# Patient Record
Sex: MALE | Race: WHITE | NOT HISPANIC OR LATINO | Employment: OTHER | URBAN - METROPOLITAN AREA
[De-identification: names, ages, dates, MRNs, and addresses within clinical notes are randomized per-mention and may not be internally consistent; named-entity substitution may affect disease eponyms.]

---

## 2017-01-13 ENCOUNTER — ALLSCRIPTS OFFICE VISIT (OUTPATIENT)
Dept: OTHER | Facility: OTHER | Age: 72
End: 2017-01-13

## 2017-02-01 ENCOUNTER — ALLSCRIPTS OFFICE VISIT (OUTPATIENT)
Dept: RADIOLOGY | Facility: MEDICAL CENTER | Age: 72
End: 2017-02-01
Payer: COMMERCIAL

## 2017-02-09 ENCOUNTER — GENERIC CONVERSION - ENCOUNTER (OUTPATIENT)
Dept: OTHER | Facility: OTHER | Age: 72
End: 2017-02-09

## 2017-02-15 ENCOUNTER — ALLSCRIPTS OFFICE VISIT (OUTPATIENT)
Dept: RADIOLOGY | Facility: MEDICAL CENTER | Age: 72
End: 2017-02-15
Payer: COMMERCIAL

## 2017-02-28 ENCOUNTER — ALLSCRIPTS OFFICE VISIT (OUTPATIENT)
Dept: OTHER | Facility: OTHER | Age: 72
End: 2017-02-28

## 2017-03-31 ENCOUNTER — ALLSCRIPTS OFFICE VISIT (OUTPATIENT)
Dept: OTHER | Facility: OTHER | Age: 72
End: 2017-03-31

## 2017-04-07 ENCOUNTER — ALLSCRIPTS OFFICE VISIT (OUTPATIENT)
Dept: OTHER | Facility: OTHER | Age: 72
End: 2017-04-07

## 2017-04-21 DIAGNOSIS — Z12.5 ENCOUNTER FOR SCREENING FOR MALIGNANT NEOPLASM OF PROSTATE: ICD-10-CM

## 2017-04-21 DIAGNOSIS — M48.061 SPINAL STENOSIS OF LUMBAR REGION: ICD-10-CM

## 2017-04-21 DIAGNOSIS — Z01.818 ENCOUNTER FOR OTHER PREPROCEDURAL EXAMINATION: ICD-10-CM

## 2017-05-01 ENCOUNTER — LAB REQUISITION (OUTPATIENT)
Dept: LAB | Facility: HOSPITAL | Age: 72
End: 2017-05-01
Payer: COMMERCIAL

## 2017-05-01 ENCOUNTER — LAB (OUTPATIENT)
Dept: LAB | Facility: MEDICAL CENTER | Age: 72
End: 2017-05-01
Payer: COMMERCIAL

## 2017-05-01 ENCOUNTER — TRANSCRIBE ORDERS (OUTPATIENT)
Dept: ADMINISTRATIVE | Facility: HOSPITAL | Age: 72
End: 2017-05-01

## 2017-05-01 DIAGNOSIS — Z01.818 ENCOUNTER FOR OTHER PREPROCEDURAL EXAMINATION: ICD-10-CM

## 2017-05-01 DIAGNOSIS — M48.061 SPINAL STENOSIS OF LUMBAR REGION: ICD-10-CM

## 2017-05-01 LAB
ABO GROUP BLD: NORMAL
ALBUMIN SERPL BCP-MCNC: 3.7 G/DL (ref 3.5–5)
ALP SERPL-CCNC: 44 U/L (ref 46–116)
ALT SERPL W P-5'-P-CCNC: 29 U/L (ref 12–78)
ANION GAP SERPL CALCULATED.3IONS-SCNC: 6 MMOL/L (ref 4–13)
APTT PPP: 30 SECONDS (ref 23–35)
AST SERPL W P-5'-P-CCNC: 16 U/L (ref 5–45)
BASOPHILS # BLD AUTO: 0.02 THOUSANDS/ΜL (ref 0–0.1)
BASOPHILS NFR BLD AUTO: 1 % (ref 0–1)
BILIRUB SERPL-MCNC: 0.74 MG/DL (ref 0.2–1)
BILIRUB UR QL STRIP: NEGATIVE
BLD GP AB SCN SERPL QL: NEGATIVE
BUN SERPL-MCNC: 15 MG/DL (ref 5–25)
CALCIUM SERPL-MCNC: 9.1 MG/DL (ref 8.3–10.1)
CHLORIDE SERPL-SCNC: 106 MMOL/L (ref 100–108)
CLARITY UR: CLEAR
CO2 SERPL-SCNC: 30 MMOL/L (ref 21–32)
COLOR UR: YELLOW
CREAT SERPL-MCNC: 0.81 MG/DL (ref 0.6–1.3)
EOSINOPHIL # BLD AUTO: 0.1 THOUSAND/ΜL (ref 0–0.61)
EOSINOPHIL NFR BLD AUTO: 2 % (ref 0–6)
ERYTHROCYTE [DISTWIDTH] IN BLOOD BY AUTOMATED COUNT: 13.2 % (ref 11.6–15.1)
EST. AVERAGE GLUCOSE BLD GHB EST-MCNC: 114 MG/DL
GFR SERPL CREATININE-BSD FRML MDRD: >60 ML/MIN/1.73SQ M
GLUCOSE P FAST SERPL-MCNC: 98 MG/DL (ref 65–99)
GLUCOSE UR STRIP-MCNC: NEGATIVE MG/DL
HBA1C MFR BLD: 5.6 % (ref 4.2–6.3)
HCT VFR BLD AUTO: 41.4 % (ref 36.5–49.3)
HGB BLD-MCNC: 14 G/DL (ref 12–17)
HGB UR QL STRIP.AUTO: NEGATIVE
INR PPP: 1.02 (ref 0.86–1.16)
KETONES UR STRIP-MCNC: NEGATIVE MG/DL
LEUKOCYTE ESTERASE UR QL STRIP: NEGATIVE
LYMPHOCYTES # BLD AUTO: 1.49 THOUSANDS/ΜL (ref 0.6–4.47)
LYMPHOCYTES NFR BLD AUTO: 35 % (ref 14–44)
MCH RBC QN AUTO: 30.9 PG (ref 26.8–34.3)
MCHC RBC AUTO-ENTMCNC: 33.8 G/DL (ref 31.4–37.4)
MCV RBC AUTO: 91 FL (ref 82–98)
MONOCYTES # BLD AUTO: 0.31 THOUSAND/ΜL (ref 0.17–1.22)
MONOCYTES NFR BLD AUTO: 7 % (ref 4–12)
NEUTROPHILS # BLD AUTO: 2.29 THOUSANDS/ΜL (ref 1.85–7.62)
NEUTS SEG NFR BLD AUTO: 55 % (ref 43–75)
NITRITE UR QL STRIP: NEGATIVE
NRBC BLD AUTO-RTO: 0 /100 WBCS
PH UR STRIP.AUTO: 6.5 [PH] (ref 4.5–8)
PLATELET # BLD AUTO: 122 THOUSANDS/UL (ref 149–390)
PMV BLD AUTO: 11.5 FL (ref 8.9–12.7)
POTASSIUM SERPL-SCNC: 4 MMOL/L (ref 3.5–5.3)
PROT SERPL-MCNC: 7.2 G/DL (ref 6.4–8.2)
PROT UR STRIP-MCNC: NEGATIVE MG/DL
PROTHROMBIN TIME: 13.4 SECONDS (ref 12.1–14.4)
RBC # BLD AUTO: 4.53 MILLION/UL (ref 3.88–5.62)
RH BLD: POSITIVE
SODIUM SERPL-SCNC: 142 MMOL/L (ref 136–145)
SP GR UR STRIP.AUTO: 1 (ref 1–1.03)
SPECIMEN EXPIRATION DATE: NORMAL
UROBILINOGEN UR QL STRIP.AUTO: 0.2 E.U./DL
WBC # BLD AUTO: 4.22 THOUSAND/UL (ref 4.31–10.16)

## 2017-05-01 PROCEDURE — 83036 HEMOGLOBIN GLYCOSYLATED A1C: CPT

## 2017-05-01 PROCEDURE — 86900 BLOOD TYPING SEROLOGIC ABO: CPT | Performed by: NEUROLOGICAL SURGERY

## 2017-05-01 PROCEDURE — 81003 URINALYSIS AUTO W/O SCOPE: CPT

## 2017-05-01 PROCEDURE — 85730 THROMBOPLASTIN TIME PARTIAL: CPT

## 2017-05-01 PROCEDURE — 85025 COMPLETE CBC W/AUTO DIFF WBC: CPT

## 2017-05-01 PROCEDURE — 80053 COMPREHEN METABOLIC PANEL: CPT

## 2017-05-01 PROCEDURE — 36415 COLL VENOUS BLD VENIPUNCTURE: CPT

## 2017-05-01 PROCEDURE — 86901 BLOOD TYPING SEROLOGIC RH(D): CPT | Performed by: NEUROLOGICAL SURGERY

## 2017-05-01 PROCEDURE — 85610 PROTHROMBIN TIME: CPT

## 2017-05-01 PROCEDURE — 86850 RBC ANTIBODY SCREEN: CPT | Performed by: NEUROLOGICAL SURGERY

## 2017-05-12 ENCOUNTER — ALLSCRIPTS OFFICE VISIT (OUTPATIENT)
Dept: OTHER | Facility: OTHER | Age: 72
End: 2017-05-12

## 2017-05-15 ENCOUNTER — GENERIC CONVERSION - ENCOUNTER (OUTPATIENT)
Dept: OTHER | Facility: OTHER | Age: 72
End: 2017-05-15

## 2017-05-18 RX ORDER — SILDENAFIL 100 MG/1
TABLET, FILM COATED ORAL
COMMUNITY
Start: 2014-01-30 | End: 2022-02-02

## 2017-05-18 RX ORDER — ATORVASTATIN CALCIUM 20 MG/1
20 TABLET, FILM COATED ORAL DAILY
COMMUNITY
Start: 2012-10-23 | End: 2018-05-21 | Stop reason: SDUPTHER

## 2017-05-18 RX ORDER — DOXAZOSIN MESYLATE 4 MG/1
4 TABLET ORAL DAILY
COMMUNITY
End: 2018-05-21 | Stop reason: SDUPTHER

## 2017-05-18 RX ORDER — FLUTICASONE PROPIONATE 50 MCG
SPRAY, SUSPENSION (ML) NASAL
COMMUNITY
Start: 2015-07-21 | End: 2018-02-08 | Stop reason: SDUPTHER

## 2017-05-18 RX ORDER — ASPIRIN 81 MG/1
81 TABLET, CHEWABLE ORAL EVERY OTHER DAY
COMMUNITY
End: 2021-12-23

## 2017-05-22 ENCOUNTER — ALLSCRIPTS OFFICE VISIT (OUTPATIENT)
Dept: OTHER | Facility: OTHER | Age: 72
End: 2017-05-22

## 2017-05-23 LAB
LYME 18 KD IGG (HISTORICAL): ABNORMAL
LYME 23 KD IGG (HISTORICAL): ABNORMAL
LYME 23 KD IGM (HISTORICAL): ABNORMAL
LYME 28 KD IGG (HISTORICAL): ABNORMAL
LYME 30 KD IGG (HISTORICAL): ABNORMAL
LYME 39 KD IGG (HISTORICAL): ABNORMAL
LYME 39 KD IGM (HISTORICAL): ABNORMAL
LYME 41 KD IGG (HISTORICAL): REACTIVE
LYME 41 KD IGM (HISTORICAL): REACTIVE
LYME 45 KD IGG (HISTORICAL): ABNORMAL
LYME 58 KD IGG (HISTORICAL): ABNORMAL
LYME 66 KD IGG (HISTORICAL): REACTIVE
LYME 93 KD IGG (HISTORICAL): ABNORMAL
LYME IGG (HISTORICAL): NEGATIVE
LYME IGM (HISTORICAL): NEGATIVE

## 2017-06-19 ENCOUNTER — ALLSCRIPTS OFFICE VISIT (OUTPATIENT)
Dept: OTHER | Facility: OTHER | Age: 72
End: 2017-06-19

## 2017-06-27 ENCOUNTER — GENERIC CONVERSION - ENCOUNTER (OUTPATIENT)
Dept: OTHER | Facility: OTHER | Age: 72
End: 2017-06-27

## 2017-06-27 ENCOUNTER — ANESTHESIA EVENT (OUTPATIENT)
Dept: PERIOP | Facility: HOSPITAL | Age: 72
End: 2017-06-27
Payer: COMMERCIAL

## 2017-06-28 ENCOUNTER — HOSPITAL ENCOUNTER (OUTPATIENT)
Facility: HOSPITAL | Age: 72
Setting detail: OUTPATIENT SURGERY
Discharge: HOME | End: 2017-06-28
Attending: NEUROLOGICAL SURGERY | Admitting: NEUROLOGICAL SURGERY
Payer: COMMERCIAL

## 2017-06-28 ENCOUNTER — APPOINTMENT (OUTPATIENT)
Dept: RADIOLOGY | Facility: HOSPITAL | Age: 72
End: 2017-06-28
Payer: COMMERCIAL

## 2017-06-28 ENCOUNTER — ANESTHESIA (OUTPATIENT)
Dept: PERIOP | Facility: HOSPITAL | Age: 72
End: 2017-06-28
Payer: COMMERCIAL

## 2017-06-28 VITALS
SYSTOLIC BLOOD PRESSURE: 130 MMHG | HEART RATE: 70 BPM | WEIGHT: 200 LBS | DIASTOLIC BLOOD PRESSURE: 80 MMHG | BODY MASS INDEX: 27.09 KG/M2 | TEMPERATURE: 97.1 F | RESPIRATION RATE: 18 BRPM | HEIGHT: 72 IN | OXYGEN SATURATION: 93 %

## 2017-06-28 PROBLEM — M48.062 LUMBAR STENOSIS WITH NEUROGENIC CLAUDICATION: Chronic | Status: ACTIVE | Noted: 2017-06-28

## 2017-06-28 PROBLEM — M51.26 LUMBAR DISC HERNIATION: Chronic | Status: ACTIVE | Noted: 2017-06-28

## 2017-06-28 PROBLEM — M54.16 LUMBAR RADICULAR PAIN: Chronic | Status: ACTIVE | Noted: 2017-06-28

## 2017-06-28 PROBLEM — M51.26 LUMBAR DISC HERNIATION: Chronic | Status: RESOLVED | Noted: 2017-06-28 | Resolved: 2017-06-28

## 2017-06-28 LAB
ABO GROUP BLD: NORMAL
BLD GP AB SCN SERPL QL: NEGATIVE
RH BLD: POSITIVE
SPECIMEN EXPIRATION DATE: NORMAL

## 2017-06-28 PROCEDURE — 86850 RBC ANTIBODY SCREEN: CPT | Performed by: NEUROLOGICAL SURGERY

## 2017-06-28 PROCEDURE — 86900 BLOOD TYPING SEROLOGIC ABO: CPT | Performed by: NEUROLOGICAL SURGERY

## 2017-06-28 PROCEDURE — 72100 X-RAY EXAM L-S SPINE 2/3 VWS: CPT

## 2017-06-28 PROCEDURE — 86901 BLOOD TYPING SEROLOGIC RH(D): CPT | Performed by: NEUROLOGICAL SURGERY

## 2017-06-28 RX ORDER — METHOCARBAMOL 500 MG/1
500 TABLET, FILM COATED ORAL EVERY 6 HOURS PRN
Status: DISCONTINUED | OUTPATIENT
Start: 2017-06-28 | End: 2017-06-28 | Stop reason: HOSPADM

## 2017-06-28 RX ORDER — GLYCOPYRROLATE 0.2 MG/ML
INJECTION INTRAMUSCULAR; INTRAVENOUS AS NEEDED
Status: DISCONTINUED | OUTPATIENT
Start: 2017-06-28 | End: 2017-06-28 | Stop reason: SURG

## 2017-06-28 RX ORDER — LIDOCAINE HYDROCHLORIDE AND EPINEPHRINE 10; 10 MG/ML; UG/ML
INJECTION, SOLUTION INFILTRATION; PERINEURAL AS NEEDED
Status: DISCONTINUED | OUTPATIENT
Start: 2017-06-28 | End: 2017-06-28 | Stop reason: HOSPADM

## 2017-06-28 RX ORDER — IBUPROFEN 400 MG/1
400 TABLET ORAL EVERY 6 HOURS PRN
Status: DISCONTINUED | OUTPATIENT
Start: 2017-06-28 | End: 2017-06-28 | Stop reason: HOSPADM

## 2017-06-28 RX ORDER — OXYCODONE HYDROCHLORIDE AND ACETAMINOPHEN 5; 325 MG/1; MG/1
1 TABLET ORAL EVERY 4 HOURS PRN
Qty: 28 TABLET | Refills: 0 | Status: SHIPPED | OUTPATIENT
Start: 2017-06-28 | End: 2017-07-08

## 2017-06-28 RX ORDER — FENTANYL CITRATE 50 UG/ML
INJECTION, SOLUTION INTRAMUSCULAR; INTRAVENOUS AS NEEDED
Status: DISCONTINUED | OUTPATIENT
Start: 2017-06-28 | End: 2017-06-28 | Stop reason: SURG

## 2017-06-28 RX ORDER — MORPHINE SULFATE 2 MG/ML
1 INJECTION, SOLUTION INTRAMUSCULAR; INTRAVENOUS
Status: DISCONTINUED | OUTPATIENT
Start: 2017-06-28 | End: 2017-06-28 | Stop reason: HOSPADM

## 2017-06-28 RX ORDER — SODIUM CHLORIDE, SODIUM LACTATE, POTASSIUM CHLORIDE, CALCIUM CHLORIDE 600; 310; 30; 20 MG/100ML; MG/100ML; MG/100ML; MG/100ML
100 INJECTION, SOLUTION INTRAVENOUS CONTINUOUS
Status: DISCONTINUED | OUTPATIENT
Start: 2017-06-28 | End: 2017-06-28 | Stop reason: HOSPADM

## 2017-06-28 RX ORDER — OXYCODONE HYDROCHLORIDE AND ACETAMINOPHEN 5; 325 MG/1; MG/1
1 TABLET ORAL EVERY 4 HOURS PRN
Status: DISCONTINUED | OUTPATIENT
Start: 2017-06-28 | End: 2017-06-28 | Stop reason: HOSPADM

## 2017-06-28 RX ORDER — ONDANSETRON 2 MG/ML
4 INJECTION INTRAMUSCULAR; INTRAVENOUS ONCE AS NEEDED
Status: DISCONTINUED | OUTPATIENT
Start: 2017-06-28 | End: 2017-06-28 | Stop reason: HOSPADM

## 2017-06-28 RX ORDER — METHYLPREDNISOLONE ACETATE 40 MG/ML
INJECTION, SUSPENSION INTRA-ARTICULAR; INTRALESIONAL; INTRAMUSCULAR; SOFT TISSUE AS NEEDED
Status: DISCONTINUED | OUTPATIENT
Start: 2017-06-28 | End: 2017-06-28 | Stop reason: HOSPADM

## 2017-06-28 RX ORDER — SODIUM CHLORIDE, SODIUM LACTATE, POTASSIUM CHLORIDE, CALCIUM CHLORIDE 600; 310; 30; 20 MG/100ML; MG/100ML; MG/100ML; MG/100ML
INJECTION, SOLUTION INTRAVENOUS CONTINUOUS PRN
Status: DISCONTINUED | OUTPATIENT
Start: 2017-06-28 | End: 2017-06-28 | Stop reason: SURG

## 2017-06-28 RX ORDER — MAGNESIUM HYDROXIDE 1200 MG/15ML
LIQUID ORAL AS NEEDED
Status: DISCONTINUED | OUTPATIENT
Start: 2017-06-28 | End: 2017-06-28 | Stop reason: HOSPADM

## 2017-06-28 RX ORDER — EPHEDRINE SULFATE 50 MG/ML
INJECTION, SOLUTION INTRAVENOUS AS NEEDED
Status: DISCONTINUED | OUTPATIENT
Start: 2017-06-28 | End: 2017-06-28 | Stop reason: SURG

## 2017-06-28 RX ORDER — SODIUM CHLORIDE 9 MG/ML
100 INJECTION, SOLUTION INTRAVENOUS CONTINUOUS
Status: DISCONTINUED | OUTPATIENT
Start: 2017-06-28 | End: 2017-06-28 | Stop reason: HOSPADM

## 2017-06-28 RX ORDER — PROPOFOL 10 MG/ML
INJECTION, EMULSION INTRAVENOUS AS NEEDED
Status: DISCONTINUED | OUTPATIENT
Start: 2017-06-28 | End: 2017-06-28 | Stop reason: SURG

## 2017-06-28 RX ORDER — ROCURONIUM BROMIDE 10 MG/ML
INJECTION, SOLUTION INTRAVENOUS AS NEEDED
Status: DISCONTINUED | OUTPATIENT
Start: 2017-06-28 | End: 2017-06-28 | Stop reason: SURG

## 2017-06-28 RX ORDER — ONDANSETRON 2 MG/ML
4 INJECTION INTRAMUSCULAR; INTRAVENOUS EVERY 6 HOURS PRN
Status: DISCONTINUED | OUTPATIENT
Start: 2017-06-28 | End: 2017-06-28 | Stop reason: HOSPADM

## 2017-06-28 RX ORDER — BUPIVACAINE HYDROCHLORIDE AND EPINEPHRINE 5; 5 MG/ML; UG/ML
INJECTION, SOLUTION EPIDURAL; INTRACAUDAL; PERINEURAL AS NEEDED
Status: DISCONTINUED | OUTPATIENT
Start: 2017-06-28 | End: 2017-06-28 | Stop reason: HOSPADM

## 2017-06-28 RX ORDER — LIDOCAINE HYDROCHLORIDE 10 MG/ML
INJECTION, SOLUTION INFILTRATION; PERINEURAL AS NEEDED
Status: DISCONTINUED | OUTPATIENT
Start: 2017-06-28 | End: 2017-06-28 | Stop reason: SURG

## 2017-06-28 RX ORDER — KETOROLAC TROMETHAMINE 30 MG/ML
INJECTION, SOLUTION INTRAMUSCULAR; INTRAVENOUS AS NEEDED
Status: DISCONTINUED | OUTPATIENT
Start: 2017-06-28 | End: 2017-06-28 | Stop reason: SURG

## 2017-06-28 RX ORDER — ONDANSETRON 2 MG/ML
INJECTION INTRAMUSCULAR; INTRAVENOUS AS NEEDED
Status: DISCONTINUED | OUTPATIENT
Start: 2017-06-28 | End: 2017-06-28 | Stop reason: SURG

## 2017-06-28 RX ADMIN — EPHEDRINE SULFATE 10 MG: 50 INJECTION, SOLUTION INTRAMUSCULAR; INTRAVENOUS; SUBCUTANEOUS at 08:40

## 2017-06-28 RX ADMIN — VASOPRESSIN 0.4 UNITS: 20 INJECTION, SOLUTION INTRAMUSCULAR; SUBCUTANEOUS at 09:37

## 2017-06-28 RX ADMIN — KETOROLAC TROMETHAMINE 15 MG: 30 INJECTION, SOLUTION INTRAMUSCULAR at 09:38

## 2017-06-28 RX ADMIN — NEOSTIGMINE METHYLSULFATE 4 MG: 1 INJECTION, SOLUTION INTRAMUSCULAR; INTRAVENOUS; SUBCUTANEOUS at 09:43

## 2017-06-28 RX ADMIN — DEXAMETHASONE SODIUM PHOSPHATE 8 MG: 10 INJECTION INTRAMUSCULAR; INTRAVENOUS at 08:21

## 2017-06-28 RX ADMIN — PHENYLEPHRINE HYDROCHLORIDE 50 MCG/MIN: 10 INJECTION INTRAVENOUS at 08:28

## 2017-06-28 RX ADMIN — PROPOFOL 50 MG: 10 INJECTION, EMULSION INTRAVENOUS at 08:58

## 2017-06-28 RX ADMIN — SODIUM CHLORIDE, SODIUM LACTATE, POTASSIUM CHLORIDE, AND CALCIUM CHLORIDE: .6; .31; .03; .02 INJECTION, SOLUTION INTRAVENOUS at 07:15

## 2017-06-28 RX ADMIN — ROCURONIUM BROMIDE 50 MG: 10 INJECTION, SOLUTION INTRAVENOUS at 07:44

## 2017-06-28 RX ADMIN — CEFAZOLIN SODIUM 2000 MG: 2 SOLUTION INTRAVENOUS at 07:55

## 2017-06-28 RX ADMIN — LIDOCAINE HYDROCHLORIDE 50 MG: 10 INJECTION, SOLUTION INFILTRATION; PERINEURAL at 07:43

## 2017-06-28 RX ADMIN — EPHEDRINE SULFATE 10 MG: 50 INJECTION, SOLUTION INTRAMUSCULAR; INTRAVENOUS; SUBCUTANEOUS at 09:18

## 2017-06-28 RX ADMIN — GLYCOPYRROLATE 0.8 MG: 0.2 INJECTION INTRAMUSCULAR; INTRAVENOUS at 09:43

## 2017-06-28 RX ADMIN — FENTANYL CITRATE 100 MCG: 50 INJECTION INTRAMUSCULAR; INTRAVENOUS at 07:43

## 2017-06-28 RX ADMIN — EPHEDRINE SULFATE 10 MG: 50 INJECTION, SOLUTION INTRAMUSCULAR; INTRAVENOUS; SUBCUTANEOUS at 09:06

## 2017-06-28 RX ADMIN — VASOPRESSIN 0.4 UNITS: 20 INJECTION, SOLUTION INTRAMUSCULAR; SUBCUTANEOUS at 09:28

## 2017-06-28 RX ADMIN — EPHEDRINE SULFATE 10 MG: 50 INJECTION, SOLUTION INTRAMUSCULAR; INTRAVENOUS; SUBCUTANEOUS at 09:16

## 2017-06-28 RX ADMIN — SODIUM CHLORIDE, SODIUM LACTATE, POTASSIUM CHLORIDE, AND CALCIUM CHLORIDE: .6; .31; .03; .02 INJECTION, SOLUTION INTRAVENOUS at 09:17

## 2017-06-28 RX ADMIN — PROPOFOL 200 MG: 10 INJECTION, EMULSION INTRAVENOUS at 07:43

## 2017-06-28 RX ADMIN — ONDANSETRON 4 MG: 2 INJECTION INTRAMUSCULAR; INTRAVENOUS at 07:39

## 2017-06-28 NOTE — DISCHARGE INSTRUCTIONS
Activity:    1  Do not lift more than 20 pounds for 2 weeks  Surgical incision care:    1  Keep dressing in place for 3 days  2  Keep incision dry for 3 days  3  May allow clean water to flow over incision after 3 days  4  Do not immerse the incision in water for 4 weeks  5  After 3 days, incision may be left open to air, but should remain clean  6  Do not apply any creams or ointments to the incision, unless otherwise instructed by [facility]  7  Contact office if increasing redness, drainage, pain or swelling around the incision  Postoperative medication:    1  [employer] will provide pain medication for the first 6 weeks after surgery  2  If [facility] is providing pain medication, please contact office for questions regarding dosage and modifications  3  If [facility] is not providing pain medication postoperatively, then contact pain specialist or PCP for additional instructions and prescriptions  4  Resume antiplatelet and anticoagulation medication the day after surgery, unless otherwise instructed  5  Do not operate heavy machinery or vehicles while taking sedating medications

## 2017-06-28 NOTE — OP NOTE
OPERATIVE REPORT  PATIENT NAME: Almas Moy    :    MRN: [id number]  Pt Location: AN OR ROOM 04    SURGERY DATE: 2017    Surgeon(s) and Role:     * Zo Mason MD - Primary  No assistants  No qualified resident available  Preop Diagnosis:  Neurogenic claudication due to lumbar spinal stenosis [M48 06]    Post-Op Diagnosis Codes:     * Neurogenic claudication due to lumbar spinal stenosis [M48 06]    Procedure:  1  L4-5 minimally invasive bilateral laminotomy from a left-sided approach and microdiscectomy for decompression with Metrix system and epidural steroid injection  2  Operating room microscope for microdissection  Specimen(s):  * No specimens in log *    Estimated Blood Loss:   15 mL    Drains:       Anesthesia Type:   General    Operative Indications:  Neurogenic claudication due to lumbar spinal stenosis [M48 06]    Operative Findings:  Ligamentous and facet hypertrophy contributing to stenosis in addition to moderate size disc bulge  Complications:   None    Procedure and Technique:  Clinical Note:    The goals and alternatives to the procedure described above were discussed with patient and spouse  Surgery is intended to decompress neural structures and hopefully improve radicular pain  Weakness, numbness and back pain are less likely to improve  The risks of surgery were described in detail  1  Risk of general anesthetic, with possible cardiac and respiratory complication  There is risk of infection and bleeding  2  Risk of neurological injury with new pain, weakness or numbness or difficulties with bowel and bladder function  The risk of CSF leak was described  3  Possible need for revision surgery in the future  Once all questions were answered to their satisfaction, they asked to proceed with surgery  Operating Room Note    The patient was brought to the operating room and placed under general anesthetic   Once all appropriate lines were in position, the patient was carefully turned in the prone position onto a Cameron frame  Care was taken to ensure that all pressure points were padded and the neck was in a neutral position  AP and lateral fluoroscopy were used to identify midline of the lumbar spine  A mini surgical timeout was undertaken identifying site side and level of surgery  The left the midline was prepped with alcohol swab  A spinal needle was placed under lateral fluoroscopic guidance demonstrated appropriate trajectory to the appropriate lumbar interspace  One percent lidocaine with 100,000 of epinephrine was used to infiltrate the soft tissue as the spinal needle was removed  A 2 cm incision was planned based on the entry point of the needle  The skin was then prepped and draped in usual sterile fashion  A full surgical timeout was undertaken identifying the site, side and level of surgery  The patient received preoperative antibiotics  10 blade was used to incise the skin  Monopolar cautery was used to dissect down and through the muscle fascia  A series of Metrix dilators were placed and a final Metrix retractor was placed with lateral fluoroscopy confirming the L4/5 level  This was also confirmed radiology  The operating microscope was then brought in for microdissection  Under microscopic magnification, monopolar cautery was used to remove the soft tissue off the lamina  A ball bur was used to drill the lamina and expose the underlying ligamentum flavum  The ligamentum flavum was undermined with a curved curette and removed Kerrison punch  I was able to identify the thecal sac and shoulder of the nerve root  Bipolar cautery and FloSeal were used for hemostasis  With the nerve root retracted behind a suction retractor, bipolar cautery was used for hemostasis of the epidural veins  I could appreciate a broad-based disc bulge  15 blade was used to incise the annulus   Pituitary rongeur was used to remove disc material  A moderate to large size disc fragment was removed and there was relaxation of the thecal sac  Disc space  was used to identify any other loose fragments which were than removed with pituitary rongeur  Cesia curet was used to deliver disc material into the space which was then removed with pituitary rongeur  Afterwards like a past ball hook along the ventral surface of the thecal sac and dorsal surface of the thecal sac and along the left lateral recess without any difficulty  The Metrix retractor was angled medially in the spinous process was undermined with Kerrison punch  Contralateral ligamentum flavum was removed  Afterwards like a past ball hook along the contralateral lateral recess without any difficulty  The Penfield 4 was placed in the space and ball hook was used to demonstrate the rostral extent of decompression under lateral fluoroscopy  These instruments were then removed  The surgical site was irrigated copiously with antibiotic impregnated irrigation  Bone wax, FloSeal and bipolar cautery were used for hemostasis  40 mg of Depo-Medrol and placed in the epidural space  The Metrix retractor was removed any bleeding From soft tissue was cauterized with monopolar and bipolar cautery  The surgical site was irrigated copiously with antibiotic impregnated irrigation  Vicryl suture was used to approximate the fascia and subcutaneous tissue  Monocryl was used to approximate the skin edges  0 5% Marcaine with 1/100,000 of epinephrine was used to infiltrate the soft tissue  A Miplex was applied  The count was correct at the end of the case and there were no complications  The patient was carefully transferred onto the operating gurney and extubated  The patient was transferred to the PACU where they were noted to be hemodynamically stable and neurologically unchanged  The results of surgery were discussed with patient and spouse      I was present for the entire procedure    Patient Disposition:  PACU SIGNATURE: Maria Eugenia Newberry MD  DATE: June 28, 2017  TIME: 9:54 AM

## 2017-07-05 ENCOUNTER — GENERIC CONVERSION - ENCOUNTER (OUTPATIENT)
Dept: OTHER | Facility: OTHER | Age: 72
End: 2017-07-05

## 2017-07-11 ENCOUNTER — ALLSCRIPTS OFFICE VISIT (OUTPATIENT)
Dept: OTHER | Facility: OTHER | Age: 72
End: 2017-07-11

## 2017-07-18 ENCOUNTER — GENERIC CONVERSION - ENCOUNTER (OUTPATIENT)
Dept: OTHER | Facility: OTHER | Age: 72
End: 2017-07-18

## 2017-07-31 ENCOUNTER — TRANSCRIBE ORDERS (OUTPATIENT)
Dept: ADMINISTRATIVE | Facility: HOSPITAL | Age: 72
End: 2017-07-31

## 2017-07-31 ENCOUNTER — HOSPITAL ENCOUNTER (OUTPATIENT)
Dept: CT IMAGING | Facility: HOSPITAL | Age: 72
Discharge: HOME | End: 2017-07-31
Payer: COMMERCIAL

## 2017-07-31 ENCOUNTER — ALLSCRIPTS OFFICE VISIT (OUTPATIENT)
Dept: OTHER | Facility: OTHER | Age: 72
End: 2017-07-31

## 2017-07-31 DIAGNOSIS — K57.30 DIVERTICULOSIS OF LARGE INTESTINE WITHOUT PERFORATION OR ABSCESS WITHOUT BLEEDING: ICD-10-CM

## 2017-07-31 DIAGNOSIS — K57.30 DIVERTICULOSIS OF LARGE INTESTINE WITHOUT DIVERTICULITIS: Primary | ICD-10-CM

## 2017-07-31 DIAGNOSIS — R10.32 LEFT LOWER QUADRANT PAIN: ICD-10-CM

## 2017-07-31 PROCEDURE — 74177 CT ABD & PELVIS W/CONTRAST: CPT

## 2017-07-31 RX ADMIN — IOHEXOL 100 ML: 350 INJECTION, SOLUTION INTRAVENOUS at 10:44

## 2017-07-31 RX ADMIN — IOHEXOL 50 ML: 240 INJECTION, SOLUTION INTRATHECAL; INTRAVASCULAR; INTRAVENOUS; ORAL at 10:44

## 2017-08-09 ENCOUNTER — ALLSCRIPTS OFFICE VISIT (OUTPATIENT)
Dept: OTHER | Facility: OTHER | Age: 72
End: 2017-08-09

## 2017-08-12 ENCOUNTER — ALLSCRIPTS OFFICE VISIT (OUTPATIENT)
Dept: OTHER | Facility: OTHER | Age: 72
End: 2017-08-12

## 2017-09-15 ENCOUNTER — ALLSCRIPTS OFFICE VISIT (OUTPATIENT)
Dept: OTHER | Facility: OTHER | Age: 72
End: 2017-09-15

## 2017-10-17 ENCOUNTER — ALLSCRIPTS OFFICE VISIT (OUTPATIENT)
Dept: OTHER | Facility: OTHER | Age: 72
End: 2017-10-17

## 2017-10-18 NOTE — PROGRESS NOTES
Assessment  1  Bilateral impacted cerumen (380 4) (H61 23)   2  Oral thrush (112 0) (B37 0)    Discussion/Summary    57-year-old male presenting today for ear lavage as described in the procedure  He has more narrow ear canals which I explained may make him more prone to ear wax buildup but they were not impacted  Patient tolerated procedure well and he will return to audiologist for hearing test   also wanted to follow up for his thrush  He reports being put on nystatin swish and spit for 10 days and then a lozenge which he has also completed her course  He states overall he feels better but his tongue still feels dry  On exam he does appear to have a vague grayish brown film but does not appear consistent with thrush  There are no oral or tongue lesions and no tongue redness or swelling  I told patient that I would discuss this with Dr Gerhardt Guest to see what next action would be and MA would call him being that he has been tx with 2 agents  Possible side effects of new medications were reviewed with the patient/guardian today  The treatment plan was reviewed with the patient/guardian  The patient/guardian understands and agrees with the treatment plan      Chief Complaint  Pt presents for ear lavage bilateral  Pt d/c Gabapentin  History of Present Illness  HPI: 77y/o male here today for ear lavage  saw audiologist and told him he needed ears cleaned  also states his thrush sxs have pretty much resolved, but tongue still feels dry  Review of Systems    Constitutional: no fever or chills, feels well, no tiredness, no recent weight loss or weight gain  ENT: as noted in HPI  Active Problems  1  Abdominal wall pain in left lower quadrant (789 04) (R10 32)   2  Acute diverticulitis (562 11) (K57 92)   3  Arthritis (716 90) (M19 90)   4  Chronic low back pain with sciatica (724 2,724 3,338 29) (M54 40,G89 29)   5  Colon, diverticulosis (562 10) (K57 30)   6   Enlarged prostate without lower urinary tract symptoms (luts) (600 00) (N40 0)   7  Erectile dysfunction (607 84) (N52 9)   8  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)   9  GERD without esophagitis (530 81) (K21 9)   10  Hearing loss (389 9) (H91 90)   11  Hyperlipidemia (272 4) (E78 5)   12  Insomnia (780 52) (G47 00)   13  Irritable bowel syndrome (IBS) (564 1) (K58 9)   14  Lumbar spinal stenosis (724 02) (M48 061)   15  Neurogenic claudication due to lumbar spinal stenosis (724 03) (M48 062)   16  Oral thrush (112 0) (B37 0)   17  Pre-diabetes (790 29) (R73 03)   18  Seasonal allergies (477 9) (J30 2)   19  Status post lumbar laminectomy (V45 89) (Z98 890)   20  Thrombocytopenia (287 5) (D69 6)   21  Vitamin D deficiency (268 9) (E55 9)    Past Medical History  1  History of Allergic rhinitis due to pollen (477 0) (J30 1)   2  History of Atypical pigmented skin lesion (709 00) (L81 9)   3  History of Depression screening (V79 0) (Z13 89)   4  History of Fatigue (780 79) (R53 83)   5  History of Hamstring strain (843 8) (S76 319A)   6  History of Hemorrhoids (455 6) (K64 9)   7  History of acute sinusitis (V12 69) (Z87 09)   8  History of bronchitis (V12 69) (Z87 09)   9  History of epistaxis (V12 69) (Z87 898)   10  History of epistaxis (V12 69) (Z87 898)   11  History of herpes zoster (V12 09) (Z86 19)   12  History of impacted cerumen (V12 49) (Z86 69)   13  History of impacted cerumen (V12 49) (Z86 69)   14  History of influenza vaccination (V49 89) (Z92 29)   15  History of malignant neoplasm of skin (V10 83) (Z85 828)   16  History of viral gastroenteritis (V12 09) (Z86 19)   17  History of Need for pneumococcal vaccination (V03 82) (Z23)   18  History of Need for pneumococcal vaccination (V03 82) (Z23)   19  History of Need for prophylactic vaccination and inoculation against influenza (V04 81)    (Z23)   20  History of Persistent dry cough (786 2) (R05)   21  History of Persistent dry cough (786 2) (R05)   22   History of Preoperative clearance (V72 84) (Z01 818)   23  History of Preoperative examination (V72 84) (Z01 818)   24  History of Preoperative testing (V72 84) (Z01 818)   25  History of Prostate cancer screening (V76 44) (Z12 5)   26  History of Rash (782 1) (R21)   27  History of Screening for depression (V79 0) (Z13 89)   28  History of Screening for depression (V79 0) (Z13 89)   29  History of Screening for other and unspecified genitourinary condition (V81 6) (Z13 89)   30  History of Screening for other and unspecified genitourinary condition (V81 6) (Z13 89)   31  History of Special screening for malignant neoplasm of prostate (V76 44) (Z12 5)   32  History of Special screening for other neurological conditions (V80 09) (Z13 89)   33  History of Special screening for other neurological conditions (V80 09) (Z13 89)   34  History of Wears hearing aid (V45 89) (Z97 4)    Family History  Mother    1  Family history of anemia (V18 2) (Z83 2)  Father    2  Family history of    3  Family history of Parkinson's disease (V17 2) (Z82 0)  Family History    4  Family history of Current Diet Is High In Cholesterol   5  Family history of anemia (V18 2) (Z83 2)   6  Family history of cardiac disorder (V17 49) (Z82 49)   7  Denied: Family history of mental disorder   8  Denied: Family history of substance abuse    Social History   · Completed graduate school, masters degree   · Former smoker (J12 54) (B07 368)   · Has 1 child   · High school or GED   · Lives with spouse   ·    · No illicit drug use   · Retired   · Social alcohol use (Z78 9)  The social history was reviewed and updated today  Surgical History  1  History of Cataract Surgery   2  History of Hemorrhoidectomy   3  History Of Prior Surgery   4  History of Neuroplasty Decompression Median Nerve At Carpal Tunnel   5  History of Tonsillectomy    Current Meds   1  Aspirin 81 MG CAPS; take 1 tablet every other day; Therapy: (Recorded:2017) to Recorded   2   Atorvastatin Calcium 20 MG Oral Tablet; TAKE 1 TABLET DAILY AS DIRECTED; Therapy: 47MAG6586 to (Evaluate:65Gzm7660)  Requested for: 97Htw8104; Last   Rx:[id number] Ordered   3  [employer] D3 5000 UNIT Oral Capsule; Take 1 tablet daily; Therapy: 07Gbk2154 to (Evaluate:17Nov2016); Last Rx:[id number] Ordered   4  Doxazosin Mesylate 4 MG Oral Tablet; TAKE 1 TABLET DAILY  Requested for:   27DSA6466; Last Rx:06Jun2017; Status: ACTIVE - Renewal Denied Ordered   5  Gabapentin 300 MG Oral Capsule; TAKE 1 CAPSULE 3 times daily; Therapy: 59QFJ9954 to (Evaluate:31Fbj8233)  Requested for: 49EOS8069; Last   Rx:[id number] Ordered   6  Melatonin 5 MG Oral Capsule; TAKE 1 CAPSULE Bedtime; Therapy: 07Htt4167 to (Evaluate:15Oct2017); Last Rx:[id number] Ordered   7  Viagra 100 MG Oral Tablet; TAKE 1 TABLET DAILY 1 HOUR BEFORE NEEDED; Therapy: 11YPA7216 to (Evaluate:57Swq7033); Last Rx:[id number] Ordered    The medication list was reviewed and updated today  Allergies  1  No Known Drug Allergies  2  Latex    Vitals   Recorded: 85GDP6120 08:01AM   Temperature 97 F, Tympanic   Heart Rate 71   Pulse Quality Normal   Respiration Quality Normal   Respiration 15   Systolic 485, LUE, Sitting   Diastolic 76, LUE, Sitting   Height 6 ft    Weight 203 lb 7 oz   BMI Calculated 27 59   BSA Calculated 2 15   O2 Saturation 97, RA   Pain Scale 0     Physical Exam    Constitutional   General appearance: No acute distress, well appearing and well nourished  appears healthy,-- comfortable,-- within normal limits of ideal weight-- and-- appearance reflects stated age  Ears, Nose, Mouth, and Throat   Otoscopic examination: Abnormal  -- minimal cerumen, TM's partially visible but ear canals anatomically narrow  Oropharynx: Abnormal  -- tongue appears generally wnl, slight grayish-brown coating  no redness, swelling or excoriations  Lymphatic   Palpation of lymph nodes in neck: No lymphadenopathy           Procedure            Procedure: cerumen removal  Indication: tympanic membrane(s) could not be visualized and cerumen impaction in both ears  Procedure Note: The procedure was performed by the Provider-- and-- lasted 15 minute(s)  A otoscope was placed in the ear canal(s) to visualize the ear canal debris  The ear was cleaned by using warm water irrigation  The procedure was successful  Post-Procedure:   Complications: there were no complications  Follow-up as needed  Future Appointments    Date/Time Provider Specialty Site   11/21/2017 10:45 AM Moira Manjarrez DO Family Medicine [address]   Electronically signed by : Joy Ceja, [facility]; Oct 17 2017  9:12AM EST                       (Author)    Electronically signed by :  Abby Portillo DO; Oct 17 2017 11:04PM EST                       (Author)

## 2017-11-12 DIAGNOSIS — N52.9 MALE ERECTILE DYSFUNCTION: ICD-10-CM

## 2017-11-12 DIAGNOSIS — E78.5 HYPERLIPIDEMIA: ICD-10-CM

## 2017-11-12 DIAGNOSIS — E55.9 VITAMIN D DEFICIENCY: ICD-10-CM

## 2017-11-12 DIAGNOSIS — D69.6 THROMBOCYTOPENIA (HCC): ICD-10-CM

## 2017-11-12 DIAGNOSIS — K21.9 GASTRO-ESOPHAGEAL REFLUX DISEASE WITHOUT ESOPHAGITIS: ICD-10-CM

## 2017-11-12 DIAGNOSIS — M19.90 OSTEOARTHRITIS: ICD-10-CM

## 2017-11-12 DIAGNOSIS — N40.0 ENLARGED PROSTATE WITHOUT LOWER URINARY TRACT SYMPTOMS (LUTS): ICD-10-CM

## 2017-11-12 DIAGNOSIS — F40.243 FEAR OF FLYING: ICD-10-CM

## 2017-11-12 DIAGNOSIS — R73.03 PREDIABETES: ICD-10-CM

## 2017-11-14 ENCOUNTER — TRANSCRIBE ORDERS (OUTPATIENT)
Dept: ADMINISTRATIVE | Facility: HOSPITAL | Age: 72
End: 2017-11-14

## 2017-11-14 ENCOUNTER — APPOINTMENT (OUTPATIENT)
Dept: LAB | Facility: MEDICAL CENTER | Age: 72
End: 2017-11-14
Payer: COMMERCIAL

## 2017-11-14 DIAGNOSIS — E55.9 VITAMIN D DEFICIENCY: ICD-10-CM

## 2017-11-14 DIAGNOSIS — M19.90 OSTEOARTHRITIS: ICD-10-CM

## 2017-11-14 DIAGNOSIS — F40.243 FEAR OF FLYING: ICD-10-CM

## 2017-11-14 DIAGNOSIS — Z12.5 ENCOUNTER FOR SCREENING FOR MALIGNANT NEOPLASM OF PROSTATE: ICD-10-CM

## 2017-11-14 DIAGNOSIS — R73.03 PREDIABETES: ICD-10-CM

## 2017-11-14 DIAGNOSIS — N52.9 MALE ERECTILE DYSFUNCTION: ICD-10-CM

## 2017-11-14 DIAGNOSIS — N40.0 ENLARGED PROSTATE WITHOUT LOWER URINARY TRACT SYMPTOMS (LUTS): ICD-10-CM

## 2017-11-14 DIAGNOSIS — E78.5 HYPERLIPIDEMIA: ICD-10-CM

## 2017-11-14 DIAGNOSIS — K21.9 GASTRO-ESOPHAGEAL REFLUX DISEASE WITHOUT ESOPHAGITIS: ICD-10-CM

## 2017-11-14 DIAGNOSIS — D69.6 THROMBOCYTOPENIA (HCC): ICD-10-CM

## 2017-11-14 LAB
ALBUMIN SERPL BCP-MCNC: 3.5 G/DL (ref 3.5–5)
ALP SERPL-CCNC: 49 U/L (ref 46–116)
ALT SERPL W P-5'-P-CCNC: 31 U/L (ref 12–78)
ANION GAP SERPL CALCULATED.3IONS-SCNC: 4 MMOL/L (ref 4–13)
AST SERPL W P-5'-P-CCNC: 19 U/L (ref 5–45)
BASOPHILS # BLD AUTO: 0.02 THOUSANDS/ΜL (ref 0–0.1)
BASOPHILS NFR BLD AUTO: 0 % (ref 0–1)
BILIRUB SERPL-MCNC: 0.62 MG/DL (ref 0.2–1)
BUN SERPL-MCNC: 16 MG/DL (ref 5–25)
CALCIUM SERPL-MCNC: 8.5 MG/DL (ref 8.3–10.1)
CHLORIDE SERPL-SCNC: 106 MMOL/L (ref 100–108)
CHOLEST SERPL-MCNC: 149 MG/DL (ref 50–200)
CO2 SERPL-SCNC: 29 MMOL/L (ref 21–32)
CREAT SERPL-MCNC: 0.78 MG/DL (ref 0.6–1.3)
EOSINOPHIL # BLD AUTO: 0.12 THOUSAND/ΜL (ref 0–0.61)
EOSINOPHIL NFR BLD AUTO: 3 % (ref 0–6)
ERYTHROCYTE [DISTWIDTH] IN BLOOD BY AUTOMATED COUNT: 13.7 % (ref 11.6–15.1)
EST. AVERAGE GLUCOSE BLD GHB EST-MCNC: 111 MG/DL
GFR SERPL CREATININE-BSD FRML MDRD: 91 ML/MIN/1.73SQ M
GLUCOSE P FAST SERPL-MCNC: 100 MG/DL (ref 65–99)
HBA1C MFR BLD: 5.5 % (ref 4.2–6.3)
HCT VFR BLD AUTO: 41.4 % (ref 36.5–49.3)
HDLC SERPL-MCNC: 45 MG/DL (ref 40–60)
HGB BLD-MCNC: 14 G/DL (ref 12–17)
LDLC SERPL CALC-MCNC: 87 MG/DL (ref 0–100)
LYMPHOCYTES # BLD AUTO: 1.58 THOUSANDS/ΜL (ref 0.6–4.47)
LYMPHOCYTES NFR BLD AUTO: 33 % (ref 14–44)
MCH RBC QN AUTO: 31 PG (ref 26.8–34.3)
MCHC RBC AUTO-ENTMCNC: 33.8 G/DL (ref 31.4–37.4)
MCV RBC AUTO: 92 FL (ref 82–98)
MONOCYTES # BLD AUTO: 0.37 THOUSAND/ΜL (ref 0.17–1.22)
MONOCYTES NFR BLD AUTO: 8 % (ref 4–12)
NEUTROPHILS # BLD AUTO: 2.66 THOUSANDS/ΜL (ref 1.85–7.62)
NEUTS SEG NFR BLD AUTO: 56 % (ref 43–75)
NRBC BLD AUTO-RTO: 0 /100 WBCS
PLATELET # BLD AUTO: 130 THOUSANDS/UL (ref 149–390)
PMV BLD AUTO: 11.4 FL (ref 8.9–12.7)
POTASSIUM SERPL-SCNC: 4.2 MMOL/L (ref 3.5–5.3)
PROT SERPL-MCNC: 7.2 G/DL (ref 6.4–8.2)
PSA SERPL-MCNC: 0.5 NG/ML (ref 0–4)
RBC # BLD AUTO: 4.52 MILLION/UL (ref 3.88–5.62)
SODIUM SERPL-SCNC: 139 MMOL/L (ref 136–145)
TRIGL SERPL-MCNC: 86 MG/DL
TSH SERPL DL<=0.05 MIU/L-ACNC: 1.86 UIU/ML (ref 0.36–3.74)
WBC # BLD AUTO: 4.76 THOUSAND/UL (ref 4.31–10.16)

## 2017-11-14 PROCEDURE — G0103 PSA SCREENING: HCPCS

## 2017-11-14 PROCEDURE — 36415 COLL VENOUS BLD VENIPUNCTURE: CPT

## 2017-11-14 PROCEDURE — 85025 COMPLETE CBC W/AUTO DIFF WBC: CPT

## 2017-11-14 PROCEDURE — 83036 HEMOGLOBIN GLYCOSYLATED A1C: CPT

## 2017-11-14 PROCEDURE — 84443 ASSAY THYROID STIM HORMONE: CPT

## 2017-11-14 PROCEDURE — 80053 COMPREHEN METABOLIC PANEL: CPT

## 2017-11-14 PROCEDURE — 80061 LIPID PANEL: CPT

## 2017-11-15 ENCOUNTER — GENERIC CONVERSION - ENCOUNTER (OUTPATIENT)
Dept: OTHER | Facility: OTHER | Age: 72
End: 2017-11-15

## 2017-11-21 ENCOUNTER — GENERIC CONVERSION - ENCOUNTER (OUTPATIENT)
Dept: OTHER | Facility: OTHER | Age: 72
End: 2017-11-21

## 2018-01-02 ENCOUNTER — ALLSCRIPTS OFFICE VISIT (OUTPATIENT)
Dept: OTHER | Facility: OTHER | Age: 73
End: 2018-01-02

## 2018-01-03 NOTE — PROGRESS NOTES
Assessment   1  Acute bronchitis (466 0) (J20 9)    Plan   Acute bronchitis    · Symbicort 160-4 5 MCG/ACT Inhalation Aerosol; INHALE 2 PUFFS TWICE DAILY  RINSE MOUTH AFTER USE    Discussion/Summary      Patient is a 19-year-old male acute bronchitis - patient's previous infection appeared likely secondary to bronchitis  His current symptoms are likely continuation of his infection  He was advised that his coughing may persist for 4-6 weeks  Continue with supportive care  Maintain proper hydration  Take over-the-counter Mucinex  Is given samples of Symbicort and advised to use for the next 2 weeks  If his symptoms are worsening or persist, he was advised to call  Chief Complaint   1  Cold Symptoms  patient presents with dry cough and congestion      History of Present Illness   Cold Symptoms:    Bryson Romero presents with complaints of gradual onset of constant episodes of mild cold symptoms  Episodes started 2 weeks ago  His symptoms are caused by no known event  Symptoms are improved by OTC cold medications  Associated symptoms include dry cough-- and-- ear pain, but-- no nasal congestion,-- no scratchy throat,-- no sore throat,-- no facial pressure,-- no facial pain,-- no headache,-- no shortness of breath,-- no fatigue,-- no fever-- and-- no chills  Review of Systems        Constitutional: feeling poorly-- and-- feeling tired  ENT: no nosebleeds,-- no sore throat-- and-- no nasal discharge  Cardiovascular: no chest pain-- and-- no palpitations  Respiratory: cough, but-- no shortness of breath during exertion  Gastrointestinal: no nausea-- and-- no diarrhea  Genitourinary: no dysuria-- and-- no nocturia  Musculoskeletal: no arthralgias-- and-- no myalgias  Integumentary: no rashes-- and-- no skin lesions  Neurological: no headache-- and-- no numbness  Active Problems   1  Arthritis (691 90) (M19 90)   2   Chronic low back pain with sciatica (724 2,724 3,338 29) (M54 40,G89 29)   3  Colon, diverticulosis (562 10) (K57 30)   4  Enlarged prostate without lower urinary tract symptoms (luts) (600 00) (N40 0)   5  Erectile dysfunction (607 84) (N52 9)   6  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)   7  GERD without esophagitis (530 81) (K21 9)   8  Hearing loss (389 9) (H91 90)   9  Hyperlipidemia (272 4) (E78 5)   10  Insomnia (780 52) (G47 00)   11  Irritable bowel syndrome (IBS) (564 1) (K58 9)   12  Lumbar spinal stenosis (724 02) (M48 061)   13  Neurogenic claudication due to lumbar spinal stenosis (724 03) (M48 062)   14  Pre-diabetes (790 29) (R73 03)   15  Seasonal allergies (477 9) (J30 2)   16  Status post lumbar laminectomy (V45 89) (Z98 890)   17  Thrombocytopenia (287 5) (D69 6)   18  Vitamin D deficiency (268 9) (E55 9)    Past Medical History   1  History of Abdominal wall pain in left lower quadrant (789 04) (R10 32)   2  History of Acute diverticulitis (562 11) (K57 92)   3  History of Allergic rhinitis due to pollen (477 0) (J30 1)   4  History of Atypical pigmented skin lesion (709 00) (L81 9)   5  History of Bilateral impacted cerumen (380 4) (H61 23)   6  History of Depression screening (V79 0) (Z13 89)   7  History of Fatigue (780 79) (R53 83)   8  History of Hamstring strain (843 8) (S76 319A)   9  History of Hemorrhoids (455 6) (K64 9)   10  History of acute sinusitis (V12 69) (Z87 09)   11  History of bronchitis (V12 69) (Z87 09)   12  History of candidiasis of mouth (V12 09) (Z86 19)   13  History of epistaxis (V12 69) (Z87 898)   14  History of epistaxis (V12 69) (Z87 898)   15  History of herpes zoster (V12 09) (Z86 19)   16  History of impacted cerumen (V12 49) (Z86 69)   17  History of impacted cerumen (V12 49) (Z86 69)   18  History of influenza vaccination (V49 89) (Z92 29)   19  History of malignant neoplasm of skin (V10 83) (Z85 828)   20  Denied: History of substance abuse   21   History of viral gastroenteritis (V12 09) (Z86 19) 22  History of Need for pneumococcal vaccination (V03 82) (Z23)   23  History of Need for pneumococcal vaccination (V03 82) (Z23)   24  History of Need for prophylactic vaccination and inoculation against influenza (V04 81)      (Z23)   25  History of Persistent dry cough (786 2) (R05)   26  History of Persistent dry cough (786 2) (R05)   27  History of Preoperative clearance (V72 84) (Z01 818)   28  History of Preoperative examination (V72 84) (Z01 818)   29  History of Preoperative testing (V72 84) (Z01 818)   30  History of Prostate cancer screening (V76 44) (Z12 5)   31  History of Rash (782 1) (R21)   32  History of Screening for depression (V79 0) (Z13 89)   33  History of Screening for depression (V79 0) (Z13 89)   34  History of Screening for other and unspecified genitourinary condition (V81 6) (Z13 89)   35  History of Screening for other and unspecified genitourinary condition (V81 6) (Z13 89)   36  History of Special screening for malignant neoplasm of prostate (V76 44) (Z12 5)   37  History of Special screening for other neurological conditions (V80 09) (Z13 89)   38  History of Special screening for other neurological conditions (V80 09) (Z13 89)   39  History of Wears hearing aid (V45 89) (Z97 4)  Active Problems And Past Medical History Reviewed: The active problems and past medical history were reviewed and updated today  Family History   Mother    1  Family history of anemia (V18 2) (Z83 2)  Father    2  Family history of    3  Family history of Parkinson's disease (V17 2) (Z82 0)  Family History    4  Family history of Current Diet Is High In Cholesterol   5  Family history of anemia (V18 2) (Z83 2)   6  Family history of cardiac disorder (V17 49) (Z82 49)   7  Denied: Family history of mental disorder   8  Denied: Family history of substance abuse  Family History Reviewed: The family history was reviewed and updated today         Social History    · Completed graduate school, masters degree   · Former smoker (I67 19) (B35 993)   · Has 1 child   · High school or GED   · Lives with spouse   ·    · No illicit drug use   · Retired   · Social alcohol use (Z78 9)  The social history was reviewed and updated today  The social history was reviewed and is unchanged  Surgical History   1  History of Cataract Surgery   2  History of Hemorrhoidectomy   3  History Of Prior Surgery   4  History of Neuroplasty Decompression Median Nerve At Carpal Tunnel   5  History of Tonsillectomy  Surgical History Reviewed: The surgical history was reviewed and updated today  Current Meds    1  Aspirin 81 MG CAPS; take 1 tablet every other day; Therapy: (Recorded:07Apr2017) to Recorded   2  Atorvastatin Calcium 20 MG Oral Tablet; TAKE 1 TABLET DAILY AS DIRECTED; Therapy: 27LQM5898 to (Evaluate:16Nov2018)  Requested for: 21Nov2017; Last     Rx:21Nov2017 Ordered   3  [employer] D3 5000 UNIT Oral Capsule; Take 1 tablet daily; Therapy: 11Tcr0614 to (Evaluate:17Nov2016); Last Rx:23Nov2015 Ordered   4  Doxazosin Mesylate 4 MG Oral Tablet; TAKE 1 TABLET DAILY  Requested for:     21Nov2017; Last Rx:21Nov2017 Ordered   5  Sildenafil Citrate 20 MG Oral Tablet; Take 2-5 tablets by mouth as needed for sexual     activity; Therapy: 19USK9086 to (Last Rx:21Nov2017) Ordered     The medication list was reviewed and updated today  Allergies   1  No Known Drug Allergies  2  Latex    Vitals    Recorded: 83FCA4161 12:29PM   Temperature 98 4 F, Tympanic   Heart Rate 78   Systolic 960, LUE   Diastolic 80, LUE   Height 6 ft    Weight 204 lb    BMI Calculated 27 67   BSA Calculated 2 15   O2 Saturation 99     Physical Exam        Constitutional      General appearance: No acute distress, well appearing and well nourished  Eyes      Conjunctiva and lids: No swelling, erythema, or discharge  Pupils and irises: Equal, round and reactive to light         Ears, Nose, Mouth, and Throat      External inspection of ears and nose: Normal        Nasal mucosa, septum, and turbinates: Normal without edema or erythema  Oropharynx: Normal with no erythema, edema, exudate or lesions  Pulmonary      Respiratory effort: No increased work of breathing or signs of respiratory distress  Auscultation of lungs: Clear to auscultation, equal breath sounds bilaterally, no wheezes, no rales, no rhonci  Cardiovascular      Auscultation of heart: Normal rate and rhythm, normal S1 and S2, without murmurs  Examination of extremities for edema and/or varicosities: Normal        Abdomen      Abdomen: Non-tender, no masses  Liver and spleen: No hepatomegaly or splenomegaly  Lymphatic      Palpation of lymph nodes in neck: No lymphadenopathy  Musculoskeletal      Gait and station: Normal        Inspection/palpation of joints, bones, and muscles: Normal        Skin      Skin and subcutaneous tissue: Normal without rashes or lesions  Future Appointments      Date/Time Provider Specialty Site   05/21/2018 10:45 AM Sonali Olson DO Family Medicine [address]    Electronically signed by :  Lauren Goodwin DO; Jan 2 2018  1:12PM EST                       (Author)

## 2018-01-10 NOTE — RESULT NOTES
Message   Recorded as Task   Date: 02/07/2017 11:34 AM, Created By: Stephanie Tao   Task Name: Follow Up   Assigned To: [id number] [address] end procedure,Team   Regarding Patient: John Lackey, Status: Active   CommentCameron Petit - 07 Feb 2017 11:34 AM     TASK CREATED  Pt  is S/P B/L L5 TFESI#1 on 2/01 by Dr Mary Jeffers  2nd inj is on 2/15  Stephanie Tao - 08 Feb 2017 3:08 PM     TASK EDITED  pt reports 10-15 % relief post inj , with no s/s of infection  2nd inj  is on 2/15     Marialuisa Kulkarni - 08 Feb 2017 3:50 PM     TASK REPLIED TO: Previously Assigned To Marialuisa Kulkarni                      aware agree        Signatures   Electronically signed by : Constantino Camacho, ; Feb 9 2017 12:45PM EST                       (Author)

## 2018-01-10 NOTE — RESULT NOTES
Verified Results  (1) PSA (SCREEN) (Dx V76 44 Screen for Prostate Cancer) [id number] 10:06AM Vibhamarv Warner    Order Number: [id number]     Test Name Result Flag Reference   PROSTATE SPECIFIC ANTIGEN 0 5 ng/mL  0 0-4 0   American [employer] Association Guidelines define biochemical recurrence of prostate cancer as a detectable or rising PSA value post-radical prostatectomy that is greater than or equal to 0 2 ng/mL with a second confirmatory level of greater than or equal to 0 2 ng/mL

## 2018-01-11 NOTE — RESULT NOTES
Message   Please call patient, please advise him that his recent x-ray showed arthritis in the disc spaces of his lower back  At this time, please have him continue with physical therapy  If he is not receiving any relief, we may need to check an MRI to further evaluate this lower back area  Thank you     Verified Results  * XR SPINE LUMBAR MINIMUM 4 VIEWS NON INJURY [id number] 11:04AM Rei Gates Order Number: [id number]     Test Name Result Flag Reference   XR SPINE LUMBAR MINIMUM 4 VIEWS (Report)     LUMBAR SPINE     INDICATION: Low back pain radiating into both legs for a couple months  Symptoms worse with standing     COMPARISON: None     VIEWS: AP, lateral, bilateral oblique and coned down projections; 5 images     FINDINGS:     Alignment is unremarkable  There is no radiographic evidence of acute fracture or destructive osseous lesion  There is multilevel degenerative arthritis in the lumbar spine  There is narrowing of the discs at L1-L2 and L2-L3 and L5-S1 with the changes appearing moderate at each level  There is degenerative bone spur formation at the vertebral endplates at all    levels  There is probably some degree of degenerative arthritis of the L4-L5 and L5-S1 facet joints although the changes are mild  There is atherosclerosis of the abdominal aorta         IMPRESSION:     Multilevel degenerative changes of the discs and to a lesser extent lower lumbar facet joints as mentioned above     Given radiculopathy symptoms, MRI might be indicated for further assessment       Workstation performed: [id number]     Signed by:   Chava Walter MD   10/19/16

## 2018-01-11 NOTE — CONSULTS
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 (724 03) (M48 06)    Plan  Neurogenic claudication due to lumbar spinal stenosis    · Follow-up PRN Evaluation and Treatment  Follow-up  Status: Complete  Done:  07Apr2017   Ordered; For: Neurogenic claudication due to lumbar spinal stenosis; Ordered By: Romayne Kinsman Performed:  Due: 68MHC3634    Discussion/Summary    65-year-old gentleman with lumbar neurogenic claudication secondary to stenosis at L4-5  I reviewed his history, physical examination and imaging in detail with him today  A total of 30 minutes is spent with the patient which more and 50% was spent in direct counseling and coordination of care  He is tried a number of nonsurgical pain management strategies with limited improvement in his symptoms  These continue to impact on her quality of life and daily activity  I explained that lumbar neurogenic claudication is generally progressive and on occasion Yadira progress to functional paraplegia incontinence  However surgical prophylactic decompression is not necessarily recommended  He is a candidate for an L4-5 minimally invasive bilateral laminotomy from a left-sided approach and microdiscectomy with possible epidural steroid injection  The goal of surgery is to relieve pressure neural structures and hopefully improve radicular pain and symptoms of neurogenic claudication  Weakness, numbness and back pain are less likely to improve  The risks of surgery were described in detail  1  Risk of general anesthetic with possible cardiac and respiratory complication  Risk of infection and bleeding  2  Risk of neurological injury with new pain, weakness or numbness in the legs or difficulties with bowel and bladder function  Risk of CSF leak was described  3  Possible need for additional lumbar spine surgery in the future      Once his questions were answered to his satisfaction, he wished to take some time to consider his options and may consider second surgical opinion  I reviewed the signs and symptoms of progressive lumbar radiculopathy and cauda equina syndrome and asked him to contact my office should any concerns arise  At this point in time, no plans for further follow-up through this office  I would be pleased to see him again should he have additional questions regarding surgery  Both he and his wife are in agreement with this course of action  The patient has the current Goals: Improve standing and walking tolerance  The patent has the current Barriers: None  Patient is able to Self-Care  Self Referrals: No      Chief Complaint  New patient referred by Dr Ama Tejeda for low back and leg pain      History of Present Illness  Pleasant 53-year-old gentleman accompanied by his wife today  Last August, after golfing, he began to notice pain radiating down the back of both legs  Around this time he also noticed it was difficult to walk along the beach in [place]  He currently denies a significant midline lower back pain unless he sits for prolonged period of time  He does notice bilateral buttock pain which radiates into both hamstrings but does not go below the knee  He has no pain when sitting or lying down  The pain occurs when he stands  It remains if he walks but improves to some extent if he leans forward  She can stand comfortably for approximately 5-10 minutes before he must sit down or lean forward  He can walk for approximately 45 minutes  He denies any weakness or numbness in his legs aside from some numbness in his toes when he first wakes up in the morning which resolves quickly  He denies any difficulties with bowel and bladder function or change in perineal sensation  Gabapentin meloxicam of not been helpful  He underwent for epidural steroid injections in the lumbar spine which provided one or 2 days of relief  He presents today with an MRI of the lumbar spine and to discuss his surgical options        Review of Systems    Constitutional: No fever or chills, feels well, no tiredness, no recent weight gain or weight loss  Eyes: No complaints of eye pain, no red eyes, no discharge from eyes, no itchy eyes  ENT: no complaints of earache, no hearing loss, no nosebleeds, no nasal discharge, no sore throat, no hoarseness  Cardiovascular: No complaints of slow heart rate, no fast heart rate, no chest pain, no palpitations, no leg claudication, no lower extremity  Respiratory: No complaints of shortness of breath, no wheezing, no cough, no SOB on exertion, no orthopnea or PND  Gastrointestinal: constipation and IBS, but no abdominal pain, no nausea, no vomiting, no diarrhea and no blood in stools  Genitourinary: No complaints of dysuria, no incontinence, no hesitancy, no nocturia, no genital lesion, no testicular pain  Musculoskeletal: limb pain (Bilateral legs), but no joint swelling, no myalgias, no joint stiffness and no limb swelling    The patient presents with complaints of arthralgias (Bilateral sciatica pain - Bilateral buttocks, hips, legs - Lower back pain centered in the middle on spine, does not radiate)  Integumentary: No complaints of skin rash or skin lesions, no itching, no skin wound, no dry skin  Neurological: numbness, tingling, difficulty walking and Bilateral toes more on the right then the left, but no headache, no confusion, no dizziness, no limb weakness, no convulsions and no fainting  Psychiatric: not suicidal, no anxiety, no personality change, no sleep disturbances, no depression and no emotional problems  Endocrine: No complaints of proptosis, no hot flashes, no muscle weakness, no erectile dysfunction, no deepening of the voice, no feelings of weakness  Hematologic/Lymphatic: No complaints of swollen glands, no swollen glands in the neck, does not bleed easily, no easy bruising  ROS reviewed  Active Problems    1  Anemia (285 9) (D64 9)   2   Anxiety (300 00) (F41 9)   3  Bilateral impacted cerumen (380 4) (H61 23)   4  Bilateral sciatica (724 3) (M54 31,M54 32)   5  Chronic low back pain with sciatica (724 2,724 3,338 29) (M54 40,G89 29)   6  Enlarged prostate without lower urinary tract symptoms (luts) (600 00) (N40 0)   7  Epistaxis (784 7) (R04 0)   8  Erectile dysfunction (607 84) (N52 9)   9  Esophagitis, reflux (530 11) (K21 0)   10  GERD without esophagitis (530 81) (K21 9)   11  Hyperlipidemia (272 4) (E78 5)   12  Irritable bowel syndrome (IBS) (564 1) (K58 9)   13  Lumbar spinal stenosis (724 02) (M48 06)   14  Neurogenic claudication due to lumbar spinal stenosis (724 03) (M48 06)   15  Pre-diabetes (790 29) (R73 03)   16  Screening for depression (V79 0) (Z13 89)   17  Screening for other and unspecified genitourinary condition (V81 6) (Z13 89)   18  Special screening for other neurological conditions (V80 09) (Z13 89)   19  Vitamin D deficiency (268 9) (E55 9)    Past Medical History    1  History of Allergic rhinitis due to pollen (477 0) (J30 1)   2  History of Atypical pigmented skin lesion (709 00) (L81 9)   3  History of Fatigue (780 79) (R53 83)   4  History of Hamstring strain (843 8) (S76 319A)   5  History of Hemorrhoids (455 6) (K64 9)   6  History of acute sinusitis (V12 69) (Z87 09)   7  History of bronchitis (V12 69) (Z87 09)   8  History of epistaxis (V12 69) (Z87 898)   9  History of impacted cerumen (V12 49) (Z86 69)   10  History of impacted cerumen (V12 49) (Z86 69)   11  History of malignant neoplasm of skin (V10 83) (Z85 828)   12  History of viral gastroenteritis (V12 09) (Z86 19)   13  History of Need for pneumococcal vaccination (V03 82) (Z23)   14  History of Need for pneumococcal vaccination (V03 82) (Z23)   15  History of Need for prophylactic vaccination and inoculation against influenza (V04 81)    (Z23)   16  History of Persistent dry cough (786 2) (R05)   17  History of Persistent dry cough (786 2) (R05)   18   History of Screening for depression (V79 0) (Z13 89)   19  History of Screening for other and unspecified genitourinary condition (V81 6) (Z13 89)   20  History of Special screening for malignant neoplasm of prostate (V76 44) (Z12 5)   21  History of Special screening for other neurological conditions (V80 09) (Z13 89)    The active problems and past medical history were reviewed and updated today  Surgical History    1  History of Cataract Surgery   2  History of Hemorrhoidectomy   3  History Of Prior Surgery   4  History of Neuroplasty Decompression Median Nerve At Carpal Tunnel   5  History of Tonsillectomy    The surgical history was reviewed and updated today  Family History  Mother    1  Family history of anemia (V18 2) (Z83 2)  Father    2  Family history of    3  Family history of Parkinson's disease (V17 2) (Z82 0)  Family History    4  Family history of Current Diet Is High In Cholesterol   5  Family history of anemia (V18 2) (Z83 2)   6  Family history of cardiac disorder (V17 49) (Z82 49)    The family history was reviewed and updated today  Social History    · Completed graduate school, masters degree   · Former smoker (W55 91) (U70 110)   · Has 1 child   · High school or GED   · Lives with spouse   ·    · No illicit drug use   · Retired   · Social alcohol use (Z78 9)  The social history was reviewed and updated today  Current Meds   1  Aspirin 81 MG CAPS; take 1 tablet every other day; Therapy: (Recorded:2017) to Recorded   2  Atorvastatin Calcium 20 MG Oral Tablet; TAKE 1 TABLET DAILY AS DIRECTED; Therapy: 46XBM2592 to (Evaluate:10Yhz1227)  Requested for: 99Zku0950; Last   Rx:16Xtk6083 Ordered   3  [employer] D3 5000 UNIT Oral Capsule; Take 1 tablet daily; Therapy: 2013 to (Evaluate:2016); Last Rx:2015 Ordered   4  Doxazosin Mesylate 4 MG Oral Tablet; TAKE 1 TABLET DAILY  Requested for:   04Wzg4968; Last Rx:76Csk5431 Ordered   5   Metamucil Clear & Natural POWD; MIX 1 PACKET Daily; Therapy: (Recorded:07Apr2017) to Recorded   6  Viagra 100 MG Oral Tablet; TAKE 1 TABLET DAILY 1 HOUR BEFORE NEEDED; Therapy: 48MXV5497 to (Evaluate:77Plm6594); Last Rx:60Lgm8814 Ordered    The medication list was reviewed and updated today  Allergies    1  No Known Drug Allergies    Vitals  Vital Signs    Recorded: 07Apr2017 10:53AM   Temperature 97 8 F   Heart Rate 64   Respiration 14   Systolic 568   Diastolic 80   Height 6 ft    Weight 204 lb 6 oz   BMI Calculated 27 72   BSA Calculated 2 15   Pain Scale 0     Physical Exam     Constitutional Patient appears the stated age  No signs of acute distress present  No involuntary movement  Patient is cooperative  Respiratory Auscultation of lungs: Clear to auscultation bilaterally  Cardiovascular Auscultation of heart: No extra sounds  Musculo: Spine Lumbar/Sacral Spine: Normal    Skin warm and dry  No rashes, lesions or ecchymosis  Neurologic - Mental Status: Alert and Oriented x3  Mood and affect: Affect is normal   Memory is intact  Motor System - Lower Extremities: Heel walk and toe walk intact and with out signs of paresis  5/5 power in lower extremities  Muscle tone: Normal bilaterally  Muscle Bulk: Normal bilaterally  Reflexes: Biceps reflexes are intact bilaterally  Brachioradialis reflexes are intact bilaterally  Achilles reflexes are 2+ bilaterally  Babinski's reflex is down going bilaterally  Murray's sign is absent bilaterally  Deep tendon reflexes: 1+ right patella, 1+ left patella, 0 right ankle jerk and 0 left ankle jerkno ankle clonus on the right and no ankle clonus on the left  Superficial/Primitive Reflexes: Babinski reflex absent on the right and Babinski reflex absent on the left  Coordination: Finger to nose was normal    Sensory: Sensation grossly intact to light touch  Gait and Station: Able to heal toe gait and Romberg negative         Results/Data    I personally reviewed the mri in detail with the patient  MRI Review MRI of the lumbar spine without contrast dated December 22, 2016  Normal lumbar lordosis  Degenerative changes throughout the lumbar spine  At L3-4 there is mild by foraminal stenosis secondary to degenerative changes  At L5-S1 there is mild by foraminal stenosis secondary to degenerative changes and minimal central canal stenosis secondary to broad-based disc bulge  At L4-5 there is moderate central with severe left greater than right lateral recess stenosis and mild to moderate foraminal stenosis secondary to broad-based disc bulge and facet and ligamentous hypertrophy, asymmetric to the left  There is redundancy of the nerve roots of the cauda equina below this level  No other areas of significant neural compression  Intrathecal contents otherwise unremarkable        Future Appointments    Date/Time Provider Specialty Site   06/16/2017 12:45 PM Chasity Soria DO [address]     Signatures   Electronically signed by : ARTURO Johnson ; Apr 7 2017  1:21PM EST                       (Author)

## 2018-01-13 VITALS
DIASTOLIC BLOOD PRESSURE: 74 MMHG | SYSTOLIC BLOOD PRESSURE: 116 MMHG | WEIGHT: 202.5 LBS | HEART RATE: 86 BPM | RESPIRATION RATE: 18 BRPM | TEMPERATURE: 98.9 F | HEIGHT: 72 IN | OXYGEN SATURATION: 94 % | BODY MASS INDEX: 27.43 KG/M2

## 2018-01-13 VITALS
RESPIRATION RATE: 16 BRPM | TEMPERATURE: 97.2 F | DIASTOLIC BLOOD PRESSURE: 70 MMHG | HEIGHT: 72 IN | SYSTOLIC BLOOD PRESSURE: 128 MMHG | BODY MASS INDEX: 27.69 KG/M2 | WEIGHT: 204.44 LBS | HEART RATE: 68 BPM | OXYGEN SATURATION: 98 %

## 2018-01-13 VITALS
WEIGHT: 203.44 LBS | DIASTOLIC BLOOD PRESSURE: 76 MMHG | OXYGEN SATURATION: 97 % | HEART RATE: 71 BPM | TEMPERATURE: 97 F | SYSTOLIC BLOOD PRESSURE: 126 MMHG | RESPIRATION RATE: 15 BRPM | BODY MASS INDEX: 27.56 KG/M2 | HEIGHT: 72 IN

## 2018-01-13 VITALS
WEIGHT: 205 LBS | BODY MASS INDEX: 27.77 KG/M2 | DIASTOLIC BLOOD PRESSURE: 72 MMHG | HEIGHT: 72 IN | RESPIRATION RATE: 12 BRPM | SYSTOLIC BLOOD PRESSURE: 124 MMHG | HEART RATE: 68 BPM

## 2018-01-13 VITALS
OXYGEN SATURATION: 96 % | DIASTOLIC BLOOD PRESSURE: 84 MMHG | HEIGHT: 72 IN | HEART RATE: 68 BPM | SYSTOLIC BLOOD PRESSURE: 136 MMHG | RESPIRATION RATE: 15 BRPM | BODY MASS INDEX: 27.39 KG/M2 | WEIGHT: 202.25 LBS | TEMPERATURE: 96.8 F

## 2018-01-13 VITALS
HEIGHT: 78 IN | SYSTOLIC BLOOD PRESSURE: 156 MMHG | HEART RATE: 76 BPM | DIASTOLIC BLOOD PRESSURE: 88 MMHG | WEIGHT: 205 LBS | RESPIRATION RATE: 14 BRPM | BODY MASS INDEX: 23.72 KG/M2

## 2018-01-14 VITALS
DIASTOLIC BLOOD PRESSURE: 82 MMHG | HEART RATE: 67 BPM | OXYGEN SATURATION: 98 % | SYSTOLIC BLOOD PRESSURE: 128 MMHG | HEIGHT: 72 IN | BODY MASS INDEX: 26.89 KG/M2 | WEIGHT: 198.5 LBS | RESPIRATION RATE: 16 BRPM | TEMPERATURE: 97.9 F

## 2018-01-14 VITALS
TEMPERATURE: 97.8 F | DIASTOLIC BLOOD PRESSURE: 80 MMHG | HEIGHT: 72 IN | RESPIRATION RATE: 14 BRPM | SYSTOLIC BLOOD PRESSURE: 142 MMHG | WEIGHT: 204.38 LBS | HEART RATE: 64 BPM | BODY MASS INDEX: 27.68 KG/M2

## 2018-01-14 VITALS
HEIGHT: 72 IN | WEIGHT: 204 LBS | OXYGEN SATURATION: 95 % | BODY MASS INDEX: 27.63 KG/M2 | TEMPERATURE: 97.3 F | HEART RATE: 64 BPM | DIASTOLIC BLOOD PRESSURE: 78 MMHG | SYSTOLIC BLOOD PRESSURE: 130 MMHG | RESPIRATION RATE: 16 BRPM

## 2018-01-14 VITALS
BODY MASS INDEX: 27.27 KG/M2 | WEIGHT: 201.38 LBS | HEIGHT: 72 IN | HEART RATE: 72 BPM | RESPIRATION RATE: 16 BRPM | TEMPERATURE: 96.9 F | SYSTOLIC BLOOD PRESSURE: 112 MMHG | DIASTOLIC BLOOD PRESSURE: 68 MMHG

## 2018-01-14 NOTE — MISCELLANEOUS
Message  S/w pt on telephone POD 7  He reports that she is doing very well overall and she denies any incisional issues or fevers  At the moment, his right leg pain has completely resolved, but he is experiencing pain in his left leg that is similar to before surgery  He understands that this pain may take months to dissipate  Verified date/time/location of his upcoming POV and advised him to call the office with any further questions or concerns, or if any incisional issues or fevers would arise  Went over incision care with patient and he had no further questions at this time  He was appreciative for the call  Active Problems    1  Arthritis (716 90) (M19 90)   2  Chronic low back pain with sciatica (724 2,724 3,338 29) (M54 40,G89 29)   3  Enlarged prostate without lower urinary tract symptoms (luts) (600 00) (N40 0)   4  Erectile dysfunction (607 84) (N52 9)   5  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)   6  GERD without esophagitis (530 81) (K21 9)   7  Hearing loss (389 9) (H91 90)   8  Hyperlipidemia (272 4) (E78 5)   9  Irritable bowel syndrome (IBS) (564 1) (K58 9)   10  Lumbar spinal stenosis (724 02) (M48 06)   11  Neurogenic claudication due to lumbar spinal stenosis (724 03) (M48 06)   12  Pre-diabetes (790 29) (R73 03)   13  Preoperative clearance (V72 84) (Z01 818)   14  Seasonal allergies (477 9) (J30 2)   15  Shingles (053 9) (B02 9)   16  Thrombocytopenia (287 5) (D69 6)   17  Vitamin D deficiency (268 9) (E55 9)    Current Meds   1  Aspirin 81 MG CAPS; take 1 tablet every other day; Therapy: (Recorded:36Asc9631) to Recorded   2  Atorvastatin Calcium 20 MG Oral Tablet (Lipitor); TAKE 1 TABLET DAILY AS DIRECTED; Therapy: 94FZC8497 to (Evaluate:80Aom6196)  Requested for: 58Qlq2232; Last   Rx:91Ldi8669 Ordered   3  [employer]S D3 5000 UNIT Oral Capsule; Take 1 tablet daily; Therapy: 29Apr2013 to (Evaluate:17Nov2016); Last Rx:23Nov2015 Ordered   4   Doxazosin Mesylate 4 MG Oral Tablet; TAKE 1 TABLET DAILY Requested for:   16OWR9338; Last Rx:06Jun2017; Status: ACTIVE - Renewal Denied Ordered   5  Metamucil Clear & Natural POWD; MIX 1 PACKET Daily; Therapy: (Recorded:07Apr2017) to Recorded   6  Viagra 100 MG Oral Tablet; TAKE 1 TABLET DAILY 1 HOUR BEFORE NEEDED; Therapy: 24BZY6092 to (Evaluate:16Dnj5394); Last Rx:34Yzm7622 Ordered    Allergies    1  No Known Drug Allergies    2   Latex    Signatures   Electronically signed by : Dayanara Rossi RN; Jul 5 2017  2:54PM EST                       (Author)

## 2018-01-15 VITALS
BODY MASS INDEX: 27.79 KG/M2 | WEIGHT: 205.13 LBS | DIASTOLIC BLOOD PRESSURE: 82 MMHG | RESPIRATION RATE: 14 BRPM | OXYGEN SATURATION: 97 % | TEMPERATURE: 97.4 F | HEIGHT: 72 IN | SYSTOLIC BLOOD PRESSURE: 130 MMHG | HEART RATE: 67 BPM

## 2018-01-15 VITALS
BODY MASS INDEX: 27.5 KG/M2 | RESPIRATION RATE: 12 BRPM | HEART RATE: 72 BPM | SYSTOLIC BLOOD PRESSURE: 136 MMHG | HEIGHT: 72 IN | DIASTOLIC BLOOD PRESSURE: 68 MMHG | WEIGHT: 203 LBS

## 2018-01-15 NOTE — PROGRESS NOTES
Chief Complaint  Patient presents post L4-5 MIS b/l laminotomy from left-sided approach and microdiscectomy for decompression and RAMILA  History of Present Illness  HPI: Patient presents for 2 week POV for incision check with his wife  He reports that he is doing okay, and he denies any incisional issues or fevers  Patient reports that he no longer has any problems with his right leg, but the pain in his left buttock and posterior thigh is worse than prior to surgery  He also states that the pain is relieved by sitting or laying down and worsens with ambulation  He denies any n/t in his groin area or incontinence  Patient is no longer taking narcotic pain medication  Active Problems    1  Arthritis (716 90) (M19 90)   2  Chronic low back pain with sciatica (724 2,724 3,338 29) (M54 40,G89 29)   3  Enlarged prostate without lower urinary tract symptoms (luts) (600 00) (N40 0)   4  Erectile dysfunction (607 84) (N52 9)   5  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)   6  GERD without esophagitis (530 81) (K21 9)   7  Hearing loss (389 9) (H91 90)   8  Hyperlipidemia (272 4) (E78 5)   9  Irritable bowel syndrome (IBS) (564 1) (K58 9)   10  Lumbar spinal stenosis (724 02) (M48 06)   11  Neurogenic claudication due to lumbar spinal stenosis (724 03) (M48 06)   12  Pre-diabetes (790 29) (R73 03)   13  Preoperative clearance (V72 84) (Z01 818)   14  Seasonal allergies (477 9) (J30 2)   15  Shingles (053 9) (B02 9)   16  Thrombocytopenia (287 5) (D69 6)   17  Vitamin D deficiency (268 9) (E55 9)    Current Meds   1  Aspirin 81 MG CAPS; take 1 tablet every other day; Therapy: (Recorded:57Dea1001) to Recorded   2  Atorvastatin Calcium 20 MG Oral Tablet; TAKE 1 TABLET DAILY AS DIRECTED; Therapy: 93XCR3543 to (Evaluate:76Xmk4459)  Requested for: 54Szy0501; Last   Rx:15Mal4602 Ordered   3  [employer]S D3 5000 UNIT Oral Capsule; Take 1 tablet daily; Therapy: 22Sfk7153 to (Evaluate:94Iwh8562); Last Rx:23Nov2015 Ordered   4  Doxazosin Mesylate 4 MG Oral Tablet; TAKE 1 TABLET DAILY  Requested for:   45KLP2970; Last Rx:06Jun2017; Status: ACTIVE - Renewal Denied Ordered   5  Metamucil Clear & Natural POWD; MIX 1 PACKET Daily; Therapy: (Recorded:07Apr2017) to Recorded   6  Viagra 100 MG Oral Tablet; TAKE 1 TABLET DAILY 1 HOUR BEFORE NEEDED; Therapy: 60DUR3517 to (Evaluate:09Rxr3624); Last Rx:93Qaw9510 Ordered    Allergies    1  No Known Drug Allergies    2  Latex    Vitals  Signs    Temperature: 98 2 F  Respiration: 16  Systolic: 134  Diastolic: 80  Height: 6 ft   Weight: 202 lb 6 4 oz  BMI Calculated: 27 45  BSA Calculated: 2 14    Procedure    Wound Exam: well healed with no sign of infection  Procedure Note:   Complications: Incision HAILE   there were no complications  Plan  Neurogenic claudication due to lumbar spinal stenosis    · Gabapentin 300 MG Oral Capsule; 1 TAB QHS X 3D, THEN 1 TAB BID X 3D, THEN  1 TAB TID    Discussion/Summary    Reviewed incision care with patient and wife including daily observation for s/s infection including: increased erythema, edema, drainage, dehiscence of incision or fever >101  Advised to continue cleansing areas with soap and water and pat dry  Not to apply any lotions, creams, or ointments, & not to submerge in any water  He is to maintain activity restrictions until cleared by the surgeon  Per EM, patient is to start titrating Gabapentin to a therapeutic dose  He will start at 300mg QHS and progress to 300mg TID  This was explained to the patient as well as the importance of not stopping the medication without contacting our office first since it may cause a seizure  Verified date/time/location of upcoming POV and reminded her to call the office with any further questions or concerns, or if any incisional issues or fevers would arise  He verbalized understanding        Future Appointments    Date/Time Provider Specialty Site   11/21/2017 10:45 AM T[place] Man [place] [facility] [place]   08/09/2017 10:15 AM ARTURO Hernandez   Neurosurgery [facility]     Signatures   Electronically signed by : Jesus Mcmillan RN; Jul 11 2017 12:53PM EST                       (Author)    Electronically signed by : ARTURO Gutierrez ; Jul 11 2017  9:06PM EST

## 2018-01-16 NOTE — MISCELLANEOUS
Message  S/w pt on telephone that is scheduled for surgery tomorrow, 6/28/17  Verified allergies and went over pre-op instructions, including bathing and NPO status  Patient currently denies taking any blood thinning medications  Answered any questions he may have had at this time  Patient states that his shingles rash has completely resolved and he is feeling much better  He finished the course of medication treatment  Active Problems    1  Arthritis (716 90) (M19 90)   2  Chronic low back pain with sciatica (724 2,724 3,338 29) (M54 40,G89 29)   3  Enlarged prostate without lower urinary tract symptoms (luts) (600 00) (N40 0)   4  Erectile dysfunction (607 84) (N52 9)   5  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)   6  GERD without esophagitis (530 81) (K21 9)   7  Hearing loss (389 9) (H91 90)   8  Hyperlipidemia (272 4) (E78 5)   9  Irritable bowel syndrome (IBS) (564 1) (K58 9)   10  Lumbar spinal stenosis (724 02) (M48 06)   11  Neurogenic claudication due to lumbar spinal stenosis (724 03) (M48 06)   12  Pre-diabetes (790 29) (R73 03)   13  Preoperative clearance (V72 84) (Z01 818)   14  Seasonal allergies (477 9) (J30 2)   15  Shingles (053 9) (B02 9)   16  Thrombocytopenia (287 5) (D69 6)   17  Vitamin D deficiency (268 9) (E55 9)    Current Meds   1  Aspirin 81 MG CAPS; take 1 tablet every other day; Therapy: (Recorded:07Apr2017) to Recorded   2  Atorvastatin Calcium 20 MG Oral Tablet (Lipitor); TAKE 1 TABLET DAILY AS DIRECTED; Therapy: 27JKB0318 to (Evaluate:60Dbp5581)  Requested for: 81Psf0404; Last   Rx:24Ygb2095 Ordered   3  [employer]S D3 5000 UNIT Oral Capsule; Take 1 tablet daily; Therapy: 29Apr2013 to (Evaluate:17Nov2016); Last Rx:23Nov2015 Ordered   4  Doxazosin Mesylate 4 MG Oral Tablet; TAKE 1 TABLET DAILY  Requested for:   02OSO8461; Last Rx:06Jun2017; Status: ACTIVE - Renewal Denied Ordered   5  Metamucil Clear & Natural POWD; MIX 1 PACKET Daily; Therapy: (Recorded:07Apr2017) to Recorded   6  Viagra 100 MG Oral Tablet; TAKE 1 TABLET DAILY 1 HOUR BEFORE NEEDED; Therapy: 27ZDF8862 to (Evaluate:72Nvn6127); Last Rx:78Zmm5371 Ordered    Allergies    1  No Known Drug Allergies    2   Latex    Signatures   Electronically signed by : Tiffanie Alvarez RN; Jun 27 2017 11:26AM EST                       (Author)

## 2018-01-22 VITALS
WEIGHT: 202.4 LBS | DIASTOLIC BLOOD PRESSURE: 80 MMHG | HEIGHT: 72 IN | SYSTOLIC BLOOD PRESSURE: 128 MMHG | BODY MASS INDEX: 27.41 KG/M2 | RESPIRATION RATE: 16 BRPM | TEMPERATURE: 98.2 F

## 2018-01-22 VITALS
OXYGEN SATURATION: 98 % | DIASTOLIC BLOOD PRESSURE: 70 MMHG | TEMPERATURE: 97 F | WEIGHT: 202 LBS | RESPIRATION RATE: 14 BRPM | BODY MASS INDEX: 27.36 KG/M2 | HEART RATE: 52 BPM | SYSTOLIC BLOOD PRESSURE: 118 MMHG | HEIGHT: 72 IN

## 2018-01-23 VITALS
TEMPERATURE: 98.4 F | HEIGHT: 72 IN | DIASTOLIC BLOOD PRESSURE: 80 MMHG | WEIGHT: 204 LBS | SYSTOLIC BLOOD PRESSURE: 124 MMHG | BODY MASS INDEX: 27.63 KG/M2 | HEART RATE: 78 BPM | OXYGEN SATURATION: 99 %

## 2018-02-08 ENCOUNTER — OFFICE VISIT (OUTPATIENT)
Dept: FAMILY MEDICINE CLINIC | Facility: CLINIC | Age: 73
End: 2018-02-08
Payer: COMMERCIAL

## 2018-02-08 VITALS
HEART RATE: 73 BPM | TEMPERATURE: 97.1 F | WEIGHT: 205.9 LBS | OXYGEN SATURATION: 92 % | BODY MASS INDEX: 27.93 KG/M2 | DIASTOLIC BLOOD PRESSURE: 72 MMHG | SYSTOLIC BLOOD PRESSURE: 118 MMHG

## 2018-02-08 DIAGNOSIS — H69.81 EUSTACHIAN TUBE DYSFUNCTION, RIGHT: Primary | ICD-10-CM

## 2018-02-08 PROBLEM — H69.91 EUSTACHIAN TUBE DYSFUNCTION, RIGHT: Status: ACTIVE | Noted: 2018-02-08

## 2018-02-08 PROCEDURE — 99213 OFFICE O/P EST LOW 20 MIN: CPT | Performed by: FAMILY MEDICINE

## 2018-02-08 RX ORDER — FLUTICASONE PROPIONATE 50 MCG
2 SPRAY, SUSPENSION (ML) NASAL DAILY
Qty: 3 BOTTLE | Refills: 3 | Status: SHIPPED | OUTPATIENT
Start: 2018-02-08 | End: 2019-12-03 | Stop reason: SDUPTHER

## 2018-02-08 RX ORDER — BUDESONIDE AND FORMOTEROL FUMARATE DIHYDRATE 160; 4.5 UG/1; UG/1
2 AEROSOL RESPIRATORY (INHALATION) 2 TIMES DAILY
COMMUNITY
Start: 2018-01-02 | End: 2018-05-21

## 2018-02-08 NOTE — PROGRESS NOTES
Assessment/Plan:   Eustachian tube dysfunction, right  Patient's symptoms today appear likely secondary to eustachian tube dysfunction  He does not appear to have any signs of otitis media  He believes that his symptoms originally started after flu came back from his flight in December  At this time, he was educated on the pathophysiology of this problem  He was advised he may benefit from taking his fluticasone nasal spray every day  May also use Mucinex b i d  for the next week  The if any symptoms are persisting, he was advised he may benefit from seeing an audiologist again  Diagnoses and all orders for this visit:    Eustachian tube dysfunction, right  -     fluticasone (FLONASE) 50 mcg/act nasal spray; 2 sprays into each nostril daily    Other orders  -     Cholecalciferol ([employer] D3) 5000 units capsule; Take 1 tablet by mouth daily  -     budesonide-formoterol (SYMBICORT) 160-4 5 mcg/act inhaler; Inhale 2 puffs 2 (two) times a day          Subjective:  Chief Complaint   Patient presents with    Earache     Pt presents with c/o his right ear being clogged x 1 week  Patient ID: Liz Caal is a 81 y o  male  Patient is a 70-year-old male presents today with a CC of right ear impaction for the past week  Symptoms started gradually improved he states that he has been having decreased hearing  Denies any pain drainage fevers or chills  He has not been treating this with anything  Review of Systems   Constitutional: Negative for activity change, chills, fatigue and fever  HENT: Negative for congestion, ear discharge, ear pain, sinus pressure, sore throat and tinnitus  Eyes: Negative for redness, itching and visual disturbance  Respiratory: Negative for cough and shortness of breath  Cardiovascular: Negative for chest pain and palpitations  Gastrointestinal: Negative for abdominal pain, diarrhea and nausea  Endocrine: Negative for cold intolerance and heat intolerance  Genitourinary: Negative for dysuria, flank pain and frequency  Musculoskeletal: Negative for arthralgias, back pain, gait problem and myalgias  Skin: Negative for color change  Allergic/Immunologic: Negative for environmental allergies  Neurological: Negative for dizziness, numbness and headaches  Psychiatric/Behavioral: Negative for behavioral problems and sleep disturbance  Objective:     Physical Exam   Constitutional: He is oriented to person, place, and time  He appears well-developed and well-nourished  HENT:   Head: Normocephalic and atraumatic  Right Ear: No drainage, swelling or tenderness  No mastoid tenderness  Tympanic membrane is not perforated, not erythematous and not retracted  Nose: Nose normal    Mouth/Throat: No oropharyngeal exudate  Eyes: Pupils are equal, round, and reactive to light  Right eye exhibits no discharge  Left eye exhibits no discharge  Neck: Normal range of motion  Neck supple  No tracheal deviation present  Cardiovascular: Normal rate, regular rhythm and intact distal pulses  Exam reveals no gallop and no friction rub  No murmur heard  Pulses:       Dorsalis pedis pulses are 2+ on the right side, and 2+ on the left side  Posterior tibial pulses are 2+ on the right side, and 2+ on the left side  Pulmonary/Chest: Effort normal and breath sounds normal  No respiratory distress  He has no wheezes  He has no rales  Abdominal: He exhibits no distension  There is no tenderness  There is no rebound and no guarding  Musculoskeletal: Normal range of motion  He exhibits no edema  Lymphadenopathy:        Head (right side): No submental and no submandibular adenopathy present  Head (left side): No submental and no submandibular adenopathy present  He has no cervical adenopathy  Right cervical: No superficial cervical, no deep cervical and no posterior cervical adenopathy present         Left cervical: No superficial cervical, no deep cervical and no posterior cervical adenopathy present  Neurological: He is alert and oriented to person, place, and time  No cranial nerve deficit or sensory deficit  Skin: Skin is warm, dry and intact  Psychiatric: His speech is normal and behavior is normal  Judgment normal  His mood appears not anxious  Cognition and memory are normal  He does not exhibit a depressed mood

## 2018-02-09 NOTE — ASSESSMENT & PLAN NOTE
Patient's symptoms today appear likely secondary to eustachian tube dysfunction  He does not appear to have any signs of otitis media  He believes that his symptoms originally started after flu came back from his flight in December  At this time, he was educated on the pathophysiology of this problem  He was advised he may benefit from taking his fluticasone nasal spray every day  May also use Mucinex b i d  for the next week  The if any symptoms are persisting, he was advised he may benefit from seeing an audiologist again

## 2018-05-10 LAB
25(OH)D3 SERPL-MCNC: 39 NG/ML (ref 30–100)
ALBUMIN SERPL-MCNC: 4.4 G/DL (ref 3.6–5.1)
ALBUMIN/GLOB SERPL: 1.9 (CALC) (ref 1–2.5)
ALP SERPL-CCNC: 47 U/L (ref 40–115)
ALT SERPL-CCNC: 24 U/L (ref 9–46)
AST SERPL-CCNC: 21 U/L (ref 10–35)
BASOPHILS # BLD AUTO: 32 CELLS/UL (ref 0–200)
BASOPHILS NFR BLD AUTO: 0.6 %
BILIRUB SERPL-MCNC: 0.9 MG/DL (ref 0.2–1.2)
BUN SERPL-MCNC: 15 MG/DL (ref 7–25)
BUN/CREAT SERPL: ABNORMAL (CALC) (ref 6–22)
CALCIUM ALBUM COR SERPL-MCNC: 9.2 MG/DL (CALC) (ref 8.6–10.2)
CALCIUM SERPL-MCNC: 9.2 MG/DL (ref 8.6–10.3)
CHLORIDE SERPL-SCNC: 105 MMOL/L (ref 98–110)
CHOLEST SERPL-MCNC: 166 MG/DL
CHOLEST/HDLC SERPL: 4.3 (CALC)
CO2 SERPL-SCNC: 28 MMOL/L (ref 20–31)
CREAT SERPL-MCNC: 0.84 MG/DL (ref 0.7–1.18)
EOSINOPHIL # BLD AUTO: 130 CELLS/UL (ref 15–500)
EOSINOPHIL NFR BLD AUTO: 2.4 %
ERYTHROCYTE [DISTWIDTH] IN BLOOD BY AUTOMATED COUNT: 13.7 % (ref 11–15)
EST. AVERAGE GLUCOSE BLD GHB EST-MCNC: 111 (CALC)
EST. AVERAGE GLUCOSE BLD GHB EST-SCNC: 6.2 (CALC)
GLOBULIN SER CALC-MCNC: 2.3 G/DL (CALC) (ref 1.9–3.7)
GLUCOSE SERPL-MCNC: 104 MG/DL (ref 65–99)
HBA1C MFR BLD: 5.5 % OF TOTAL HGB
HCT VFR BLD AUTO: 44.1 % (ref 38.5–50)
HDLC SERPL-MCNC: 39 MG/DL
HGB BLD-MCNC: 15 G/DL (ref 13.2–17.1)
LDLC SERPL CALC-MCNC: 97 MG/DL (CALC)
LYMPHOCYTES # BLD AUTO: 1766 CELLS/UL (ref 850–3900)
LYMPHOCYTES NFR BLD AUTO: 32.7 %
MCH RBC QN AUTO: 32 PG (ref 27–33)
MCHC RBC AUTO-ENTMCNC: 34 G/DL (ref 32–36)
MCV RBC AUTO: 94 FL (ref 80–100)
MONOCYTES # BLD AUTO: 448 CELLS/UL (ref 200–950)
MONOCYTES NFR BLD AUTO: 8.3 %
NEUTROPHILS # BLD AUTO: 3024 CELLS/UL (ref 1500–7800)
NEUTROPHILS NFR BLD AUTO: 56 %
NONHDLC SERPL-MCNC: 127 MG/DL (CALC)
PLATELET # BLD AUTO: 128 THOUSAND/UL (ref 140–400)
PMV BLD REES-ECKER: 12.1 FL (ref 7.5–12.5)
POTASSIUM SERPL-SCNC: 4.4 MMOL/L (ref 3.5–5.3)
PROT SERPL-MCNC: 6.7 G/DL (ref 6.1–8.1)
RBC # BLD AUTO: 4.69 MILLION/UL (ref 4.2–5.8)
SL AMB EGFR AFRICAN AMERICAN: 103 ML/MIN/1.73M2
SL AMB EGFR NON AFRICAN AMERICAN: 89 ML/MIN/1.73M2
SODIUM SERPL-SCNC: 141 MMOL/L (ref 135–146)
TRIGL SERPL-MCNC: 198 MG/DL
TSH SERPL-ACNC: 1.82 MIU/L (ref 0.4–4.5)
WBC # BLD AUTO: 5.4 THOUSAND/UL (ref 3.8–10.8)

## 2018-05-21 ENCOUNTER — OFFICE VISIT (OUTPATIENT)
Dept: FAMILY MEDICINE CLINIC | Facility: CLINIC | Age: 73
End: 2018-05-21
Payer: COMMERCIAL

## 2018-05-21 VITALS
HEIGHT: 74 IN | WEIGHT: 204.31 LBS | HEART RATE: 60 BPM | BODY MASS INDEX: 26.22 KG/M2 | OXYGEN SATURATION: 96 % | RESPIRATION RATE: 16 BRPM | TEMPERATURE: 97 F | SYSTOLIC BLOOD PRESSURE: 118 MMHG | DIASTOLIC BLOOD PRESSURE: 76 MMHG

## 2018-05-21 DIAGNOSIS — K21.9 GASTRO-ESOPHAGEAL REFLUX DISEASE WITHOUT ESOPHAGITIS: ICD-10-CM

## 2018-05-21 DIAGNOSIS — M54.40 LUMBAGO WITH SCIATICA: ICD-10-CM

## 2018-05-21 DIAGNOSIS — Z12.11 SCREENING FOR COLON CANCER: ICD-10-CM

## 2018-05-21 DIAGNOSIS — R73.03 PRE-DIABETES: ICD-10-CM

## 2018-05-21 DIAGNOSIS — E78.5 HYPERLIPIDEMIA, UNSPECIFIED HYPERLIPIDEMIA TYPE: Primary | ICD-10-CM

## 2018-05-21 DIAGNOSIS — K21.9 GERD WITHOUT ESOPHAGITIS: ICD-10-CM

## 2018-05-21 DIAGNOSIS — G47.00 INSOMNIA: ICD-10-CM

## 2018-05-21 DIAGNOSIS — E78.5 HYPERLIPIDEMIA: ICD-10-CM

## 2018-05-21 DIAGNOSIS — E55.9 VITAMIN D DEFICIENCY: ICD-10-CM

## 2018-05-21 DIAGNOSIS — N52.9 MALE ERECTILE DYSFUNCTION: ICD-10-CM

## 2018-05-21 DIAGNOSIS — N40.0 ENLARGED PROSTATE WITHOUT LOWER URINARY TRACT SYMPTOMS (LUTS): ICD-10-CM

## 2018-05-21 PROBLEM — J30.2 SEASONAL ALLERGIES: Status: ACTIVE | Noted: 2017-04-07

## 2018-05-21 PROCEDURE — 99214 OFFICE O/P EST MOD 30 MIN: CPT | Performed by: FAMILY MEDICINE

## 2018-05-21 PROCEDURE — 3725F SCREEN DEPRESSION PERFORMED: CPT | Performed by: FAMILY MEDICINE

## 2018-05-21 RX ORDER — DOXAZOSIN MESYLATE 4 MG/1
4 TABLET ORAL DAILY
Qty: 90 TABLET | Refills: 3 | Status: SHIPPED | OUTPATIENT
Start: 2018-05-21 | End: 2019-06-03 | Stop reason: SDUPTHER

## 2018-05-21 RX ORDER — ATORVASTATIN CALCIUM 20 MG/1
20 TABLET, FILM COATED ORAL DAILY
Qty: 90 TABLET | Refills: 3 | Status: SHIPPED | OUTPATIENT
Start: 2018-05-21 | End: 2019-06-03 | Stop reason: SDUPTHER

## 2018-05-21 NOTE — PROGRESS NOTES
Assessment/Plan:   1  Hyperlipidemia, unspecified hyperlipidemia type  Reviewed fasting lipid panel with patient today  His triglycerides have significantly increased  He was advised goal for this level is less than 150  He must start a very strict low-fat/low-cholesterol diet  He believes that he has not been as active in this has been the main factor  He does take his atorvastatin regularly  Will continue with this treatment  Recheck his fasting lipid panel in 6 months  - atorvastatin (LIPITOR) 20 mg tablet; Take 1 tablet (20 mg total) by mouth daily  Dispense: 90 tablet; Refill: 3  - Lipid Panel with Direct LDL reflex; Future  - Lipid Panel with Direct LDL reflex    2  Enlarged prostate without lower urinary tract symptoms (luts)  Symptoms appear very well controlled today  At this time, continue with current treatment of Cardura  - doxazosin (CARDURA) 4 mg tablet; Take 1 tablet (4 mg total) by mouth daily  Dispense: 90 tablet; Refill: 3    3  GERD without esophagitis  Symptoms appear increasing  Patient is not recall any specific factors that have been triggering his symptoms  At this time, he may benefit from taking a H2 blocker such as Zantac  Will continue with this treatment  4  Pre-diabetes  Reviewed patient's blood work  His A1c was stable at 5 5  Continue with a strict low-fat/low-cholesterol diet  Follow-up in 6 months  - Comprehensive metabolic panel; Future  - Comprehensive metabolic panel    5  Screening for colon cancer  - Ambulatory referral to Gastroenterology; Future  - PSA Total (Reflex To Free); Future  - PSA Total (Reflex To Free)     Diagnoses and all orders for this visit:    Hyperlipidemia, unspecified hyperlipidemia type  -     atorvastatin (LIPITOR) 20 mg tablet; Take 1 tablet (20 mg total) by mouth daily    Enlarged prostate without lower urinary tract symptoms (luts)  -     doxazosin (CARDURA) 4 mg tablet;  Take 1 tablet (4 mg total) by mouth daily    Screening for colon cancer  -     Ambulatory referral to Gastroenterology; Future    GERD without esophagitis    Pre-diabetes          Subjective:    Chief Complaint   Patient presents with    Follow-up     6m check up and to discuss blood work from 5/9/2018  Needs refill on Lipitor and Cardura        Patient ID: Denae Armstrong is a 81 y o  male  Patient is a 65-year-old male presents today for follow-up on his chronic conditions  He has hyperlipidemia, insomnia, prediabetes, as well as BPH  He has been taking his medications regularly  Denies adverse reactions with his medications  Over the past few weeks, he has been noticing increasing epigastric discomfort  He states that he does have mild heartburn  He was taking omeprazole in the past however has switched to Zantac  He believes that this has been helping his symptoms  Review of Systems   Constitutional: Negative for activity change, chills, fatigue and fever  HENT: Negative for congestion, ear pain, sinus pressure and sore throat  Eyes: Negative for redness, itching and visual disturbance  Respiratory: Negative for cough and shortness of breath  Cardiovascular: Negative for chest pain and palpitations  Gastrointestinal: Negative for abdominal pain, diarrhea and nausea  Heartburn   Endocrine: Negative for cold intolerance and heat intolerance  Genitourinary: Negative for dysuria, flank pain and frequency  Musculoskeletal: Negative for arthralgias, back pain, gait problem and myalgias  Skin: Negative for color change  Allergic/Immunologic: Negative for environmental allergies  Neurological: Negative for dizziness, numbness and headaches  Psychiatric/Behavioral: Negative for behavioral problems and sleep disturbance  The following portions of the patient's history were reviewed and updated as appropriate : past family history, past medical history, past social history and past surgical history      Objective:    Vitals: 05/21/18 1058   BP: 118/76   BP Location: Left arm   Patient Position: Sitting   Cuff Size: Adult   Pulse: 60   Resp: 16   Temp: (!) 97 °F (36 1 °C)   TempSrc: Tympanic   SpO2: 96%   Weight: 92 7 kg (204 lb 5 oz)   Height: 6' 1 5" (1 867 m)        Physical Exam   Constitutional: He is oriented to person, place, and time  He appears well-developed and well-nourished  HENT:   Head: Normocephalic and atraumatic  Nose: Nose normal    Mouth/Throat: No oropharyngeal exudate  Eyes: Pupils are equal, round, and reactive to light  Right eye exhibits no discharge  Left eye exhibits no discharge  Neck: Normal range of motion  Neck supple  No tracheal deviation present  Cardiovascular: Normal rate, regular rhythm and intact distal pulses  Exam reveals no gallop and no friction rub  No murmur heard  Pulses:       Dorsalis pedis pulses are 2+ on the right side, and 2+ on the left side  Posterior tibial pulses are 2+ on the right side, and 2+ on the left side  Pulmonary/Chest: Effort normal and breath sounds normal  No respiratory distress  He has no wheezes  He has no rales  Abdominal: Soft  Bowel sounds are normal  He exhibits no distension  There is no tenderness  There is no rebound and no guarding  Musculoskeletal: Normal range of motion  He exhibits no edema  Lymphadenopathy:        Head (right side): No submental and no submandibular adenopathy present  Head (left side): No submental and no submandibular adenopathy present  He has no cervical adenopathy  Right cervical: No superficial cervical, no deep cervical and no posterior cervical adenopathy present  Left cervical: No superficial cervical, no deep cervical and no posterior cervical adenopathy present  Neurological: He is alert and oriented to person, place, and time  No cranial nerve deficit or sensory deficit  Skin: Skin is warm, dry and intact     Psychiatric: His speech is normal and behavior is normal  Judgment normal  His mood appears not anxious  Cognition and memory are normal  He does not exhibit a depressed mood  Vitals reviewed

## 2018-09-02 ENCOUNTER — OFFICE VISIT (OUTPATIENT)
Dept: FAMILY MEDICINE CLINIC | Facility: CLINIC | Age: 73
End: 2018-09-02
Payer: COMMERCIAL

## 2018-09-02 VITALS
BODY MASS INDEX: 26.78 KG/M2 | OXYGEN SATURATION: 96 % | DIASTOLIC BLOOD PRESSURE: 88 MMHG | TEMPERATURE: 96.5 F | WEIGHT: 205.8 LBS | SYSTOLIC BLOOD PRESSURE: 142 MMHG | HEART RATE: 71 BPM

## 2018-09-02 DIAGNOSIS — B02.9 HERPES ZOSTER WITHOUT COMPLICATION: ICD-10-CM

## 2018-09-02 DIAGNOSIS — L25.5 RHUS DERMATITIS: Primary | ICD-10-CM

## 2018-09-02 PROCEDURE — 99213 OFFICE O/P EST LOW 20 MIN: CPT | Performed by: FAMILY MEDICINE

## 2018-09-02 PROCEDURE — 1160F RVW MEDS BY RX/DR IN RCRD: CPT | Performed by: FAMILY MEDICINE

## 2018-09-02 RX ORDER — BETAMETHASONE DIPROPIONATE 0.5 MG/G
CREAM TOPICAL 2 TIMES DAILY
Qty: 30 G | Refills: 1 | Status: SHIPPED | OUTPATIENT
Start: 2018-09-02 | End: 2019-09-06

## 2018-09-02 RX ORDER — VALACYCLOVIR HYDROCHLORIDE 1 G/1
1000 TABLET, FILM COATED ORAL 3 TIMES DAILY
Qty: 21 TABLET | Refills: 0 | Status: SHIPPED | OUTPATIENT
Start: 2018-09-02 | End: 2019-06-03

## 2018-09-02 NOTE — ASSESSMENT & PLAN NOTE
( see rhus derm)  Will give Rx for Valtrex x7 days    Also recommend Shingrix vaccine once available

## 2018-09-02 NOTE — ASSESSMENT & PLAN NOTE
2-3  Day history of itchy rash right calf  Patient had similar rash a few years ago on his right flank, which was diagnosed to shingles  Today patient has a few small clusters right lateral calf  I think the appearance favors poison ivy, but cannot totally rule out zoster    Will give Rx for Valtrex x7 days along with topical diprolene cream   Call if further problems lastly, I advised him to consider the new Shingrix vaccine once available

## 2018-09-02 NOTE — PROGRESS NOTES
Assessment/Plan:    Rhus dermatitis  2-3  Day history of itchy rash right calf  Patient had similar rash a few years ago on his right flank, which was diagnosed to shingles  Today patient has a few small clusters right lateral calf  I think the appearance favors poison ivy, but cannot totally rule out zoster  Will give Rx for Valtrex x7 days along with topical diprolene cream   Call if further problems lastly, I advised him to consider the new Shingrix vaccine once available      Herpes zoster without complication  ( see rhus derm)  Will give Rx for Valtrex x7 days  Also recommend Shingrix vaccine once available       Diagnoses and all orders for this visit:    Rhus dermatitis  -     betamethasone, augmented, (DIPROLENE-AF) 0 05 % cream; Apply topically 2 (two) times a day    Herpes zoster without complication  -     valACYclovir (VALTREX) 1,000 mg tablet; Take 1 tablet (1,000 mg total) by mouth 3 (three) times a day for 7 days          Subjective:      Patient ID: Karolina Garcia is a 72 y o  male  2-3  Day history of itchy rash right calf  Patient had similar rash a few years ago on his right flank, which was diagnosed to shingles  The following portions of the patient's history were reviewed and updated as appropriate: allergies, current medications, past family history, past medical history, past social history, past surgical history and problem list     Review of Systems   Skin: Positive for rash           Objective:      /88 (BP Location: Left arm, Patient Position: Sitting)   Pulse 71   Temp (!) 96 5 °F (35 8 °C) (Tympanic)   Wt 93 4 kg (205 lb 12 8 oz)   SpO2 96%   BMI 26 78 kg/m²          Physical Exam   Skin:     Three vesicular cluster is right lateral calf

## 2018-10-10 ENCOUNTER — IMMUNIZATION (OUTPATIENT)
Dept: FAMILY MEDICINE CLINIC | Facility: CLINIC | Age: 73
End: 2018-10-10
Payer: COMMERCIAL

## 2018-10-10 DIAGNOSIS — Z23 ENCOUNTER FOR IMMUNIZATION: ICD-10-CM

## 2018-10-10 PROCEDURE — 90662 IIV NO PRSV INCREASED AG IM: CPT | Performed by: FAMILY MEDICINE

## 2018-10-10 PROCEDURE — G0008 ADMIN INFLUENZA VIRUS VAC: HCPCS | Performed by: FAMILY MEDICINE

## 2018-11-21 LAB
ALBUMIN SERPL-MCNC: 4.3 G/DL (ref 3.6–5.1)
ALBUMIN/GLOB SERPL: 1.8 (CALC) (ref 1–2.5)
ALP SERPL-CCNC: 53 U/L (ref 40–115)
ALT SERPL-CCNC: 22 U/L (ref 9–46)
AST SERPL-CCNC: 20 U/L (ref 10–35)
BILIRUB SERPL-MCNC: 0.9 MG/DL (ref 0.2–1.2)
BUN SERPL-MCNC: 17 MG/DL (ref 7–25)
BUN/CREAT SERPL: ABNORMAL (CALC) (ref 6–22)
CALCIUM SERPL-MCNC: 9 MG/DL (ref 8.6–10.3)
CHLORIDE SERPL-SCNC: 105 MMOL/L (ref 98–110)
CHOLEST SERPL-MCNC: 163 MG/DL
CHOLEST/HDLC SERPL: 4.1 (CALC)
CO2 SERPL-SCNC: 28 MMOL/L (ref 20–32)
CREAT SERPL-MCNC: 0.91 MG/DL (ref 0.7–1.18)
GLOBULIN SER CALC-MCNC: 2.4 G/DL (CALC) (ref 1.9–3.7)
GLUCOSE SERPL-MCNC: 106 MG/DL (ref 65–99)
HDLC SERPL-MCNC: 40 MG/DL
LDLC SERPL CALC-MCNC: 99 MG/DL (CALC)
NONHDLC SERPL-MCNC: 123 MG/DL (CALC)
POTASSIUM SERPL-SCNC: 3.9 MMOL/L (ref 3.5–5.3)
PROT SERPL-MCNC: 6.7 G/DL (ref 6.1–8.1)
PSA SERPL-MCNC: 0.6 NG/ML
SL AMB EGFR AFRICAN AMERICAN: 98 ML/MIN/1.73M2
SL AMB EGFR NON AFRICAN AMERICAN: 84 ML/MIN/1.73M2
SODIUM SERPL-SCNC: 141 MMOL/L (ref 135–146)
TRIGL SERPL-MCNC: 139 MG/DL

## 2018-11-26 ENCOUNTER — OFFICE VISIT (OUTPATIENT)
Dept: FAMILY MEDICINE CLINIC | Facility: CLINIC | Age: 73
End: 2018-11-26
Payer: COMMERCIAL

## 2018-11-26 VITALS
RESPIRATION RATE: 16 BRPM | SYSTOLIC BLOOD PRESSURE: 140 MMHG | TEMPERATURE: 97.3 F | HEART RATE: 73 BPM | BODY MASS INDEX: 26.5 KG/M2 | WEIGHT: 206.5 LBS | HEIGHT: 74 IN | DIASTOLIC BLOOD PRESSURE: 86 MMHG | OXYGEN SATURATION: 96 %

## 2018-11-26 DIAGNOSIS — Z12.11 SCREENING FOR COLON CANCER: Primary | ICD-10-CM

## 2018-11-26 DIAGNOSIS — Z11.59 NEED FOR HEPATITIS C SCREENING TEST: ICD-10-CM

## 2018-11-26 DIAGNOSIS — R73.03 PRE-DIABETES: ICD-10-CM

## 2018-11-26 DIAGNOSIS — E78.5 HYPERLIPIDEMIA, UNSPECIFIED HYPERLIPIDEMIA TYPE: ICD-10-CM

## 2018-11-26 DIAGNOSIS — N52.9 ERECTILE DYSFUNCTION, UNSPECIFIED ERECTILE DYSFUNCTION TYPE: ICD-10-CM

## 2018-11-26 DIAGNOSIS — F40.243 FEAR OF FLYING: ICD-10-CM

## 2018-11-26 PROCEDURE — 4040F PNEUMOC VAC/ADMIN/RCVD: CPT | Performed by: FAMILY MEDICINE

## 2018-11-26 PROCEDURE — 3008F BODY MASS INDEX DOCD: CPT | Performed by: FAMILY MEDICINE

## 2018-11-26 PROCEDURE — 99214 OFFICE O/P EST MOD 30 MIN: CPT | Performed by: FAMILY MEDICINE

## 2018-11-26 NOTE — PROGRESS NOTES
Assessment/Plan:   1  Hyperlipidemia, unspecified hyperlipidemia type  Reviewed fasting lipid panel with patient today  His cholesterol levels appear very well controlled  At this time, will continue with current treatment of atorvastatin as well as a strict low-fat/low-cholesterol diet  - Lipid Panel with Direct LDL reflex  - Comprehensive metabolic panel    2  Pre-diabetes  Fasting blood sugar was normal today  Patient's last A1c was 5 5  He was encouraged to continue with a strict diet and exercise plan  Will consider checking A1c again next year  - Lipid Panel with Direct LDL reflex  - Comprehensive metabolic panel    3  Erectile dysfunction, unspecified erectile dysfunction type  Symptoms appears stable today  At this time, he may continue with his Viagra p r n     Follow up if any symptoms are worsening  4  Fear of flying  Patient's symptoms appear very well controlled today  His anxiety appears much better controlled  At this time, will continue with routine monitoring  The if any symptoms should worsen, he was advised to follow up  Follow up with patient in 6 months  5  Need for hepatitis C screening test  - Hepatitis C Ab W/Refl To HCV RNA, Qn, PCR; Future  - Lipid Panel with Direct LDL reflex  - Comprehensive metabolic panel  - Hepatitis C Ab W/Refl To HCV RNA, Qn, PCR    6  Screening for colon cancer  - Ambulatory referral to Gastroenterology; Future  - Lipid Panel with Direct LDL reflex  - Comprehensive metabolic panel     Diagnoses and all orders for this visit:    Screening for colon cancer  -     Ambulatory referral to Gastroenterology; Future    Need for hepatitis C screening test  -     Hepatitis C Ab W/Refl To HCV RNA, Qn, PCR; Future    Other orders  -     Lipid Panel with Direct LDL reflex  -     Comprehensive metabolic panel          Subjective:    No chief complaint on file  Patient ID: Angelica Cabrera is a 72 y o  male      Patient is a 28-year-old male presents today for follow-up on his chronic conditions  He has hyperlipidemia, prediabetes, erectile dysfunction, mild anxiety  He has been taking his medications regularly  He denies adverse reactions with his medications  He has hyperlipidemia, prediabetes, rectal dysfunction as well as mild anxiety  He has been taking his medications regularly  He denies adverse reactions  Review of Systems   Constitutional: Negative for activity change, chills, fatigue and fever  HENT: Negative for congestion, ear pain, sinus pressure and sore throat  Eyes: Negative for redness, itching and visual disturbance  Respiratory: Negative for cough and shortness of breath  Cardiovascular: Negative for chest pain and palpitations  Gastrointestinal: Negative for abdominal pain, diarrhea and nausea  Endocrine: Negative for cold intolerance and heat intolerance  Genitourinary: Negative for dysuria, flank pain and frequency  Musculoskeletal: Negative for arthralgias, back pain, gait problem and myalgias  Skin: Negative for color change  Allergic/Immunologic: Negative for environmental allergies  Neurological: Negative for dizziness, numbness and headaches  Psychiatric/Behavioral: Negative for behavioral problems and sleep disturbance  The following portions of the patient's history were reviewed and updated as appropriate : past family history, past medical history, past social history and past surgical history        Current Outpatient Prescriptions:     aspirin 81 mg chewable tablet, Chew, Disp: , Rfl:     atorvastatin (LIPITOR) 20 mg tablet, Take 1 tablet (20 mg total) by mouth daily, Disp: 90 tablet, Rfl: 3    Cholecalciferol ([employer]S D3) 5000 units capsule, Take 1 tablet by mouth daily, Disp: , Rfl:     doxazosin (CARDURA) 4 mg tablet, Take 1 tablet (4 mg total) by mouth daily, Disp: 90 tablet, Rfl: 3    fluticasone (FLONASE) 50 mcg/act nasal spray, 2 sprays into each nostril daily, Disp: 3 Bottle, Rfl: 3   sildenafil (VIAGRA) 100 mg tablet, Take by mouth, Disp: , Rfl:     betamethasone, augmented, (DIPROLENE-AF) 0 05 % cream, Apply topically 2 (two) times a day, Disp: 30 g, Rfl: 1    valACYclovir (VALTREX) 1,000 mg tablet, Take 1 tablet (1,000 mg total) by mouth 3 (three) times a day for 7 days, Disp: 21 tablet, Rfl: 0    Objective:    Vitals:    11/26/18 1108   BP: 140/86   BP Location: Left arm   Patient Position: Sitting   Cuff Size: Adult   Pulse: 73   Resp: 16   Temp: (!) 97 3 °F (36 3 °C)   TempSrc: Tympanic   SpO2: 96%   Weight: 93 7 kg (206 lb 8 oz)   Height: 6' 1 5" (1 867 m)        Physical Exam   Constitutional: He is oriented to person, place, and time  He appears well-developed and well-nourished  HENT:   Head: Normocephalic and atraumatic  Nose: Nose normal    Mouth/Throat: No oropharyngeal exudate  Eyes: Pupils are equal, round, and reactive to light  Right eye exhibits no discharge  Left eye exhibits no discharge  Neck: Normal range of motion  Neck supple  No tracheal deviation present  Cardiovascular: Normal rate, regular rhythm and intact distal pulses  Exam reveals no gallop and no friction rub  No murmur heard  Pulses:       Dorsalis pedis pulses are 2+ on the right side, and 2+ on the left side  Posterior tibial pulses are 2+ on the right side, and 2+ on the left side  Pulmonary/Chest: Effort normal and breath sounds normal  No respiratory distress  He has no wheezes  He has no rales  Abdominal: Soft  Bowel sounds are normal  He exhibits no distension  There is no tenderness  There is no rebound and no guarding  Musculoskeletal: Normal range of motion  He exhibits no edema  Lymphadenopathy:        Head (right side): No submental and no submandibular adenopathy present  Head (left side): No submental and no submandibular adenopathy present  He has no cervical adenopathy          Right cervical: No superficial cervical, no deep cervical and no posterior cervical adenopathy present  Left cervical: No superficial cervical, no deep cervical and no posterior cervical adenopathy present  Neurological: He is alert and oriented to person, place, and time  No cranial nerve deficit or sensory deficit  Skin: Skin is warm, dry and intact  Psychiatric: His speech is normal and behavior is normal  Judgment normal  His mood appears not anxious  Cognition and memory are normal  He does not exhibit a depressed mood  Vitals reviewed

## 2018-11-27 PROBLEM — L25.5 RHUS DERMATITIS: Status: RESOLVED | Noted: 2018-09-02 | Resolved: 2018-11-27

## 2019-01-01 NOTE — PRE-PROCEDURE INSTRUCTIONS
Pre-Surgery Instructions:   Medication Instructions    aspirin 81 mg chewable tablet Patient was instructed by Physician and understands   atorvastatin (LIPITOR) 20 mg tablet Patient was instructed by Physician and understands   Cholecalciferol ([employer]S D3) 5000 units capsule Patient was instructed by Physician and understands   doxazosin (CARDURA) 4 mg tablet Patient was instructed by Physician and understands   fluticasone (FLONASE) 50 mcg/act nasal spray Patient was instructed by Physician and understands   sildenafil (VIAGRA) 100 mg tablet Patient was instructed by Physician and understands      Preop instructions given 49.5

## 2019-05-24 LAB
ALBUMIN SERPL-MCNC: 4.2 G/DL (ref 3.6–5.1)
ALBUMIN/GLOB SERPL: 1.8 (CALC) (ref 1–2.5)
ALP SERPL-CCNC: 46 U/L (ref 40–115)
ALT SERPL-CCNC: 22 U/L (ref 9–46)
AST SERPL-CCNC: 17 U/L (ref 10–35)
BILIRUB SERPL-MCNC: 0.7 MG/DL (ref 0.2–1.2)
BUN SERPL-MCNC: 18 MG/DL (ref 7–25)
BUN/CREAT SERPL: NORMAL (CALC) (ref 6–22)
CALCIUM SERPL-MCNC: 9.3 MG/DL (ref 8.6–10.3)
CHLORIDE SERPL-SCNC: 105 MMOL/L (ref 98–110)
CHOLEST SERPL-MCNC: 166 MG/DL
CHOLEST/HDLC SERPL: 4.4 (CALC)
CO2 SERPL-SCNC: 28 MMOL/L (ref 20–32)
CREAT SERPL-MCNC: 0.91 MG/DL (ref 0.7–1.18)
GLOBULIN SER CALC-MCNC: 2.4 G/DL (CALC) (ref 1.9–3.7)
GLUCOSE SERPL-MCNC: 99 MG/DL (ref 65–99)
HCV AB S/CO SERPL IA: 0.01
HCV AB SERPL QL IA: NORMAL
HDLC SERPL-MCNC: 38 MG/DL
LDLC SERPL CALC-MCNC: 102 MG/DL (CALC)
NONHDLC SERPL-MCNC: 128 MG/DL (CALC)
POTASSIUM SERPL-SCNC: 4.1 MMOL/L (ref 3.5–5.3)
PROT SERPL-MCNC: 6.6 G/DL (ref 6.1–8.1)
SL AMB EGFR AFRICAN AMERICAN: 98 ML/MIN/1.73M2
SL AMB EGFR NON AFRICAN AMERICAN: 84 ML/MIN/1.73M2
SODIUM SERPL-SCNC: 141 MMOL/L (ref 135–146)
TRIGL SERPL-MCNC: 159 MG/DL

## 2019-06-03 ENCOUNTER — OFFICE VISIT (OUTPATIENT)
Dept: FAMILY MEDICINE CLINIC | Facility: CLINIC | Age: 74
End: 2019-06-03
Payer: COMMERCIAL

## 2019-06-03 VITALS
TEMPERATURE: 97.8 F | SYSTOLIC BLOOD PRESSURE: 120 MMHG | HEIGHT: 70 IN | DIASTOLIC BLOOD PRESSURE: 60 MMHG | HEART RATE: 64 BPM | OXYGEN SATURATION: 96 % | BODY MASS INDEX: 29.72 KG/M2 | WEIGHT: 207.6 LBS

## 2019-06-03 DIAGNOSIS — E78.5 HYPERLIPIDEMIA, UNSPECIFIED HYPERLIPIDEMIA TYPE: ICD-10-CM

## 2019-06-03 DIAGNOSIS — M25.562 ACUTE PAIN OF LEFT KNEE: Primary | ICD-10-CM

## 2019-06-03 DIAGNOSIS — Z12.5 SCREENING FOR PROSTATE CANCER: ICD-10-CM

## 2019-06-03 DIAGNOSIS — N40.0 ENLARGED PROSTATE WITHOUT LOWER URINARY TRACT SYMPTOMS (LUTS): ICD-10-CM

## 2019-06-03 DIAGNOSIS — F40.243 FEAR OF FLYING: ICD-10-CM

## 2019-06-03 PROBLEM — B02.9 HERPES ZOSTER WITHOUT COMPLICATION: Status: RESOLVED | Noted: 2018-09-02 | Resolved: 2019-06-03

## 2019-06-03 PROCEDURE — 99214 OFFICE O/P EST MOD 30 MIN: CPT | Performed by: FAMILY MEDICINE

## 2019-06-03 PROCEDURE — 1036F TOBACCO NON-USER: CPT | Performed by: FAMILY MEDICINE

## 2019-06-03 PROCEDURE — 1101F PT FALLS ASSESS-DOCD LE1/YR: CPT | Performed by: FAMILY MEDICINE

## 2019-06-03 PROCEDURE — 1160F RVW MEDS BY RX/DR IN RCRD: CPT | Performed by: FAMILY MEDICINE

## 2019-06-03 PROCEDURE — 3725F SCREEN DEPRESSION PERFORMED: CPT | Performed by: FAMILY MEDICINE

## 2019-06-03 PROCEDURE — 3008F BODY MASS INDEX DOCD: CPT | Performed by: FAMILY MEDICINE

## 2019-06-03 RX ORDER — LORAZEPAM 1 MG/1
2 TABLET ORAL DAILY
Qty: 20 TABLET | Refills: 0 | Status: SHIPPED | OUTPATIENT
Start: 2019-06-03 | End: 2021-09-22 | Stop reason: ALTCHOICE

## 2019-06-03 RX ORDER — DOXAZOSIN MESYLATE 4 MG/1
4 TABLET ORAL DAILY
Qty: 90 TABLET | Refills: 3 | Status: SHIPPED | OUTPATIENT
Start: 2019-06-03 | End: 2019-12-03 | Stop reason: SDUPTHER

## 2019-06-03 RX ORDER — ATORVASTATIN CALCIUM 20 MG/1
20 TABLET, FILM COATED ORAL DAILY
Qty: 90 TABLET | Refills: 3 | Status: SHIPPED | OUTPATIENT
Start: 2019-06-03 | End: 2019-12-03 | Stop reason: SDUPTHER

## 2019-09-06 ENCOUNTER — OFFICE VISIT (OUTPATIENT)
Dept: FAMILY MEDICINE CLINIC | Facility: CLINIC | Age: 74
End: 2019-09-06
Payer: COMMERCIAL

## 2019-09-06 VITALS
TEMPERATURE: 98.6 F | SYSTOLIC BLOOD PRESSURE: 116 MMHG | BODY MASS INDEX: 29.92 KG/M2 | DIASTOLIC BLOOD PRESSURE: 80 MMHG | RESPIRATION RATE: 16 BRPM | HEIGHT: 70 IN | WEIGHT: 209 LBS | HEART RATE: 72 BPM | OXYGEN SATURATION: 95 %

## 2019-09-06 DIAGNOSIS — L23.9 ALLERGIC CONTACT DERMATITIS, UNSPECIFIED TRIGGER: Primary | ICD-10-CM

## 2019-09-06 PROCEDURE — 99214 OFFICE O/P EST MOD 30 MIN: CPT | Performed by: FAMILY MEDICINE

## 2019-09-06 PROCEDURE — 3008F BODY MASS INDEX DOCD: CPT | Performed by: FAMILY MEDICINE

## 2019-09-06 RX ORDER — TRIAMCINOLONE ACETONIDE 5 MG/G
CREAM TOPICAL 2 TIMES DAILY
Qty: 30 G | Refills: 0 | Status: ON HOLD | OUTPATIENT
Start: 2019-09-06 | End: 2022-02-15 | Stop reason: ALTCHOICE

## 2019-09-06 NOTE — PROGRESS NOTES
Assessment/Plan:   1  Allergic contact dermatitis, unspecified trigger  Symptoms appear likely secondary to a contact dermatitis  At this time, will start treatment with triamcinolone cream b i d  Until his symptoms resolve if any symptoms should worsen was advised to call immediately  - triamcinolone (KENALOG) 0 5 % cream; Apply topically 2 (two) times a day  Dispense: 30 g; Refill: 0     There are no diagnoses linked to this encounter  Subjective:    Chief Complaint   Patient presents with    Rash     Upper R thigh        Patient ID: Venu Araiza is a 69 y o  male  Rash   This is a new problem  The current episode started in the past 7 days (2 days ago)  The problem is unchanged  The affected locations include the right upper leg  The rash is characterized by itchiness and redness  Pertinent negatives include no congestion, cough, diarrhea, fatigue, fever, shortness of breath or sore throat  Review of Systems   Constitutional: Negative for activity change, chills, fatigue and fever  HENT: Negative for congestion, ear pain, sinus pressure and sore throat  Eyes: Negative for redness, itching and visual disturbance  Respiratory: Negative for cough and shortness of breath  Cardiovascular: Negative for chest pain and palpitations  Gastrointestinal: Negative for abdominal pain, diarrhea and nausea  Endocrine: Negative for cold intolerance and heat intolerance  Genitourinary: Negative for dysuria, flank pain and frequency  Musculoskeletal: Negative for arthralgias, back pain, gait problem and myalgias  Skin: Positive for rash  Negative for color change  Allergic/Immunologic: Negative for environmental allergies  Neurological: Negative for dizziness, numbness and headaches  Psychiatric/Behavioral: Negative for behavioral problems and sleep disturbance           The following portions of the patient's history were reviewed and updated as appropriate : past family history, past medical history, past social history and past surgical history  Current Outpatient Medications:     aspirin 81 mg chewable tablet, Chew, Disp: , Rfl:     atorvastatin (LIPITOR) 20 mg tablet, Take 1 tablet (20 mg total) by mouth daily, Disp: 90 tablet, Rfl: 3    Cholecalciferol (CVS D3) 5000 units capsule, Take 1 tablet by mouth daily, Disp: , Rfl:     doxazosin (CARDURA) 4 mg tablet, Take 1 tablet (4 mg total) by mouth daily, Disp: 90 tablet, Rfl: 3    fluticasone (FLONASE) 50 mcg/act nasal spray, 2 sprays into each nostril daily, Disp: 3 Bottle, Rfl: 3    LORazepam (ATIVAN) 1 mg tablet, Take 2 tablets (2 mg total) by mouth daily, Disp: 20 tablet, Rfl: 0    sildenafil (VIAGRA) 100 mg tablet, Take by mouth, Disp: , Rfl:     betamethasone, augmented, (DIPROLENE-AF) 0 05 % cream, Apply topically 2 (two) times a day (Patient not taking: Reported on 6/3/2019), Disp: 30 g, Rfl: 1    Objective:    Vitals:    09/06/19 1048   BP: 116/80   BP Location: Left arm   Patient Position: Sitting   Pulse: 72   Resp: 16   Temp: 98 6 °F (37 °C)   TempSrc: Tympanic   SpO2: 95%   Weight: 94 8 kg (209 lb)   Height: 5' 10" (1 778 m)        Physical Exam   Constitutional: He is oriented to person, place, and time  Vital signs are normal  He appears well-developed and well-nourished  HENT:   Head: Normocephalic and atraumatic  Right Ear: Hearing normal    Left Ear: Hearing normal    Nose: Nose normal  No septal deviation  Mouth/Throat: Oropharynx is clear and moist and mucous membranes are normal    Eyes: Pupils are equal, round, and reactive to light  EOM and lids are normal    Neck: Trachea normal and normal range of motion  No thyroid mass and no thyromegaly present  Cardiovascular: Normal rate  Pulmonary/Chest: Effort normal  No respiratory distress  He has no decreased breath sounds  Abdominal: Normal appearance  There is no guarding  Musculoskeletal: Normal range of motion     Neurological: He is alert and oriented to person, place, and time  He has normal strength  No cranial nerve deficit or sensory deficit  Skin: Skin is warm and dry  Psychiatric: He has a normal mood and affect  His speech is normal and behavior is normal  Judgment and thought content normal  Cognition and memory are normal    Vitals reviewed

## 2019-10-08 ENCOUNTER — IMMUNIZATIONS (OUTPATIENT)
Dept: FAMILY MEDICINE CLINIC | Facility: CLINIC | Age: 74
End: 2019-10-08
Payer: COMMERCIAL

## 2019-10-08 VITALS — TEMPERATURE: 97.7 F

## 2019-10-08 DIAGNOSIS — Z23 NEED FOR INFLUENZA VACCINATION: Primary | ICD-10-CM

## 2019-10-08 PROCEDURE — 90662 IIV NO PRSV INCREASED AG IM: CPT | Performed by: FAMILY MEDICINE

## 2019-10-08 PROCEDURE — G0008 ADMIN INFLUENZA VIRUS VAC: HCPCS | Performed by: FAMILY MEDICINE

## 2019-11-20 LAB
ALBUMIN SERPL-MCNC: 4.2 G/DL (ref 3.6–5.1)
ALBUMIN/GLOB SERPL: 1.6 (CALC) (ref 1–2.5)
ALP SERPL-CCNC: 45 U/L (ref 40–115)
ALT SERPL-CCNC: 20 U/L (ref 9–46)
AST SERPL-CCNC: 17 U/L (ref 10–35)
BILIRUB SERPL-MCNC: 0.7 MG/DL (ref 0.2–1.2)
BUN SERPL-MCNC: 18 MG/DL (ref 7–25)
BUN/CREAT SERPL: ABNORMAL (CALC) (ref 6–22)
CALCIUM SERPL-MCNC: 9 MG/DL (ref 8.6–10.3)
CHLORIDE SERPL-SCNC: 105 MMOL/L (ref 98–110)
CO2 SERPL-SCNC: 29 MMOL/L (ref 20–32)
CREAT SERPL-MCNC: 0.93 MG/DL (ref 0.7–1.18)
GLOBULIN SER CALC-MCNC: 2.6 G/DL (CALC) (ref 1.9–3.7)
GLUCOSE SERPL-MCNC: 102 MG/DL (ref 65–99)
POTASSIUM SERPL-SCNC: 4.1 MMOL/L (ref 3.5–5.3)
PROT SERPL-MCNC: 6.8 G/DL (ref 6.1–8.1)
PSA SERPL-MCNC: 0.6 NG/ML
SL AMB EGFR AFRICAN AMERICAN: 95 ML/MIN/1.73M2
SL AMB EGFR NON AFRICAN AMERICAN: 82 ML/MIN/1.73M2
SODIUM SERPL-SCNC: 142 MMOL/L (ref 135–146)

## 2019-12-03 ENCOUNTER — OFFICE VISIT (OUTPATIENT)
Dept: FAMILY MEDICINE CLINIC | Facility: CLINIC | Age: 74
End: 2019-12-03
Payer: COMMERCIAL

## 2019-12-03 VITALS
HEIGHT: 70 IN | BODY MASS INDEX: 30.06 KG/M2 | HEART RATE: 69 BPM | WEIGHT: 210 LBS | TEMPERATURE: 98.6 F | DIASTOLIC BLOOD PRESSURE: 80 MMHG | OXYGEN SATURATION: 95 % | SYSTOLIC BLOOD PRESSURE: 118 MMHG | RESPIRATION RATE: 17 BRPM

## 2019-12-03 DIAGNOSIS — D69.6 THROMBOCYTOPENIA (HCC): ICD-10-CM

## 2019-12-03 DIAGNOSIS — R73.03 PRE-DIABETES: Primary | ICD-10-CM

## 2019-12-03 DIAGNOSIS — F40.243 FEAR OF FLYING: ICD-10-CM

## 2019-12-03 DIAGNOSIS — H61.21 IMPACTED CERUMEN OF RIGHT EAR: ICD-10-CM

## 2019-12-03 DIAGNOSIS — G47.00 INSOMNIA, UNSPECIFIED TYPE: ICD-10-CM

## 2019-12-03 DIAGNOSIS — H69.81 EUSTACHIAN TUBE DYSFUNCTION, RIGHT: ICD-10-CM

## 2019-12-03 DIAGNOSIS — N40.0 ENLARGED PROSTATE WITHOUT LOWER URINARY TRACT SYMPTOMS (LUTS): ICD-10-CM

## 2019-12-03 DIAGNOSIS — Z00.00 MEDICARE ANNUAL WELLNESS VISIT, SUBSEQUENT: ICD-10-CM

## 2019-12-03 DIAGNOSIS — E78.5 HYPERLIPIDEMIA, UNSPECIFIED HYPERLIPIDEMIA TYPE: ICD-10-CM

## 2019-12-03 PROCEDURE — 99214 OFFICE O/P EST MOD 30 MIN: CPT | Performed by: FAMILY MEDICINE

## 2019-12-03 PROCEDURE — 1101F PT FALLS ASSESS-DOCD LE1/YR: CPT | Performed by: FAMILY MEDICINE

## 2019-12-03 PROCEDURE — 3008F BODY MASS INDEX DOCD: CPT | Performed by: FAMILY MEDICINE

## 2019-12-03 PROCEDURE — G0439 PPPS, SUBSEQ VISIT: HCPCS | Performed by: FAMILY MEDICINE

## 2019-12-03 PROCEDURE — 1036F TOBACCO NON-USER: CPT | Performed by: FAMILY MEDICINE

## 2019-12-03 PROCEDURE — 1170F FXNL STATUS ASSESSED: CPT | Performed by: FAMILY MEDICINE

## 2019-12-03 PROCEDURE — 3725F SCREEN DEPRESSION PERFORMED: CPT | Performed by: FAMILY MEDICINE

## 2019-12-03 PROCEDURE — 1160F RVW MEDS BY RX/DR IN RCRD: CPT | Performed by: FAMILY MEDICINE

## 2019-12-03 PROCEDURE — 1125F AMNT PAIN NOTED PAIN PRSNT: CPT | Performed by: FAMILY MEDICINE

## 2019-12-03 PROCEDURE — 69210 REMOVE IMPACTED EAR WAX UNI: CPT | Performed by: FAMILY MEDICINE

## 2019-12-03 RX ORDER — DOXAZOSIN MESYLATE 4 MG/1
4 TABLET ORAL DAILY
Qty: 90 TABLET | Refills: 1 | Status: SHIPPED | OUTPATIENT
Start: 2019-12-03 | End: 2020-12-15 | Stop reason: SDUPTHER

## 2019-12-03 RX ORDER — FLUTICASONE PROPIONATE 50 MCG
2 SPRAY, SUSPENSION (ML) NASAL DAILY
Qty: 3 BOTTLE | Refills: 3 | Status: SHIPPED | OUTPATIENT
Start: 2019-12-03 | End: 2020-12-15 | Stop reason: SDUPTHER

## 2019-12-03 RX ORDER — ATORVASTATIN CALCIUM 20 MG/1
20 TABLET, FILM COATED ORAL DAILY
Qty: 90 TABLET | Refills: 1 | Status: SHIPPED | OUTPATIENT
Start: 2019-12-03 | End: 2020-12-15 | Stop reason: SDUPTHER

## 2019-12-03 NOTE — PATIENT INSTRUCTIONS

## 2019-12-03 NOTE — PROGRESS NOTES
Assessment and Plan:     Problem List Items Addressed This Visit        Other    Enlarged prostate without lower urinary tract symptoms (luts)    Hyperlipidemia           Preventive health issues were discussed with patient, and age appropriate screening tests were ordered as noted in patient's After Visit Summary  Personalized health advice and appropriate referrals for health education or preventive services given if needed, as noted in patient's After Visit Summary       History of Present Illness:     Patient presents for Medicare Annual Wellness visit    Patient Care Team:  Oleg Brink DO as PCP - General  DO Flaca Reinoso MD     Problem List:     Patient Active Problem List   Diagnosis    Lumbar radicular pain    Lumbar stenosis with neurogenic claudication    Vitamin D deficiency    Eustachian tube dysfunction, right    GERD without esophagitis    Fear of flying    Erectile dysfunction    Enlarged prostate without lower urinary tract symptoms (luts)    Hyperlipidemia    Insomnia    Irritable bowel syndrome (IBS)    Pre-diabetes    Seasonal allergies      Past Medical and Surgical History:     Past Medical History:   Diagnosis Date    Allergic rhinitis due to pollen     last assessed: May 31, 2016    Atypical pigmented skin lesion     last assessed: May 11, 2015    Cancer [facility])     [facility]     Enlarged prostate     GERD (gastroesophageal reflux disease)     Hemorrhoids     last assessed: May 19, 2014    Hyperlipidemia     Malignant neoplasm of skin     basel cell     Persistent dry cough     last assessed/resolved: July 21, 2015    PONV (postoperative nausea and vomiting)     Wears hearing aid     resolved: June 19, 2017     Past Surgical History:   Procedure Laterality Date    CARPAL TUNNEL RELEASE Bilateral     CATARACT EXTRACTION Bilateral     COLONOSCOPY      HEMORRHOID SURGERY      NEUROPLASTY / TRANSPOSITION MEDIAN NERVE AT CARPAL TUNNEL      OTHER SURGICAL HISTORY      Basal Cell    FL LAMNOTMY INCL W/DCMPRSN NRV ROOT 1 INTRSPC LUMBR N/A 2017    Procedure: L4/5 MINIMALLY INVASIVE BILATERAL LAMINOTOMY FROM A LEFT SIDED APPROACH AND MICRODISCECTOMY WITH POSSIBLE EPIDURAL STEROID INJECTION;  Surgeon: Janine Grier MD;  [facility];  Service: Neurosurgery    TONSILLECTOMY        Family History:     Family History   Problem Relation Age of Onset    Anemia Mother     Parkinsonism Father     Anemia Family     Heart disease Family     Other Family         current diet is high in cholesterol     Alcohol abuse Neg Hx     Substance Abuse Neg Hx     Mental illness Neg Hx     Depression Neg Hx       Social History:     Social History     Socioeconomic History    Marital status: /Civil Union     Spouse name: None    Number of children: 1    Years of education: completed graduate school, Master's Degree     Highest education level: None   Occupational History    Occupation: Retired    Social Needs    Financial resource strain: None    Food insecurity:     Worry: None     Inability: None    Transportation needs:     Medical: None     Non-medical: None   Tobacco Use    Smoking status: Former Smoker     Last attempt to quit:      Years since quittin 9    Smokeless tobacco: Never Used   Substance and Sexual Activity    Alcohol use:  Yes     Alcohol/week: 7 0 standard drinks     Types: 7 Shots of liquor per week     Frequency: Monthly or less     Drinks per session: 1 or 2     Binge frequency: Never     Comment: before dinner - Social     Drug use: No    Sexual activity: None   Lifestyle    Physical activity:     Days per week: None     Minutes per session: None    Stress: None   Relationships    Social connections:     Talks on phone: None     Gets together: None     Attends Oriental orthodox service: None     Active member of club or organization: None     Attends meetings of clubs or organizations: None     Relationship status: None    Intimate partner violence:     Fear of current or ex partner: None     Emotionally abused: None     Physically abused: None     Forced sexual activity: None   Other Topics Concern    None   Social History Narrative    Lives with spouse        Medications and Allergies:     Current Outpatient Medications   Medication Sig Dispense Refill    aspirin 81 mg chewable tablet Chew 81 mg every other day       atorvastatin (LIPITOR) 20 mg tablet Take 1 tablet (20 mg total) by mouth daily 90 tablet 3    Cholecalciferol (CVS D3) 5000 units capsule Take 1 tablet by mouth daily      doxazosin (CARDURA) 4 mg tablet Take 1 tablet (4 mg total) by mouth daily 90 tablet 3    fluticasone (FLONASE) 50 mcg/act nasal spray 2 sprays into each nostril daily (Patient not taking: Reported on 12/3/2019) 3 Bottle 3    LORazepam (ATIVAN) 1 mg tablet Take 2 tablets (2 mg total) by mouth daily (Patient not taking: Reported on 12/3/2019) 20 tablet 0    sildenafil (VIAGRA) 100 mg tablet Take by mouth      triamcinolone (KENALOG) 0 5 % cream Apply topically 2 (two) times a day (Patient not taking: Reported on 12/3/2019) 30 g 0     No current facility-administered medications for this visit        Allergies   Allergen Reactions    Latex Rash     Seems Ok with paper tape but patient thinks allergic to both latex and adhesive    Other Rash     Adhesive tapes      Immunizations:     Immunization History   Administered Date(s) Administered    H1N1, All Formulations 03/23/2010    INFLUENZA 11/10/2014, 11/23/2015, 10/01/2016    Influenza Split High Dose Preservative Free IM 11/10/2014, 11/23/2015, 10/01/2016, 09/15/2017    Influenza TIV (IM) 10/15/2012, 11/06/2013    Influenza, high dose seasonal 0 5 mL 10/10/2018, 10/08/2019    Pneumococcal Conjugate 13-Valent 11/23/2015    Pneumococcal Polysaccharide PPV23 10/23/2012      Health Maintenance:         Topic Date Due    CRC Screening: Colonoscopy  07/16/2024    Hepatitis C Screening Completed         Topic Date Due    DTaP,Tdap,and Td Vaccines (1 - Tdap) 08/30/1956      Medicare Health Risk Assessment:     /80 (BP Location: Left arm, Patient Position: Sitting, Cuff Size: Large)   Pulse 69   Temp 98 6 °F (37 °C) (Tympanic)   Resp 17   Ht 5' 10" (1 778 m)   Wt 95 3 kg (210 lb)   SpO2 95%   BMI 30 13 kg/m²      Franklin Thompson is here for his Initial Wellness visit  Last Medicare Wellness visit information reviewed, patient interviewed, no change since last AWV  Health Risk Assessment:   Patient rates overall health as good  Patient feels that their physical health rating is same  Eyesight was rated as slightly worse  Hearing was rated as same  Patient feels that their emotional and mental health rating is same  Pain experienced in the last 7 days has been none  Patient states that he has experienced no weight loss or gain in last 6 months  Depression Screening:   PHQ-2 Score: 0      Fall Risk Screening: In the past year, patient has experienced: no history of falling in past year      Home Safety:  Patient does not have trouble with stairs inside or outside of their home  Patient has working smoke alarms and has working carbon monoxide detector  Home safety hazards include: none  Nutrition:   Current diet is Regular  Medications:   Patient is currently taking over-the-counter supplements  OTC medications include: see medication list  Patient is able to manage medications  Activities of Daily Living (ADLs)/Instrumental Activities of Daily Living (IADLs):   Walk and transfer into and out of bed and chair?: Yes  Dress and groom yourself?: Yes    Bathe or shower yourself?: Yes    Feed yourself?  Yes  Do your laundry/housekeeping?: Yes  Manage your money, pay your bills and track your expenses?: Yes  Make your own meals?: Yes    Do your own shopping?: Yes    Previous Hospitalizations:   Any hospitalizations or ED visits within the last 12 months?: No      Advance Care Planning: Living will: Yes    Durable POA for healthcare:  Yes    Advanced directive: Yes    Advanced directive counseling given: Yes    Five wishes given: Yes    End of Life Decisions reviewed with patient: Yes    Provider agrees with end of life decisions: Yes      Cognitive Screening:   Provider or family/friend/caregiver concerned regarding cognition?: No    PREVENTIVE SCREENINGS      Cardiovascular Screening:    General: Screening Not Indicated and History Lipid Disorder      Diabetes Screening:     General: Screening Current      Colorectal Cancer Screening:     General: Screening Current      Prostate Cancer Screening:    General: Screening Current      Osteoporosis Screening:    General: Screening Not Indicated      Abdominal Aortic Aneurysm (AAA) Screening:    Risk factors include: age between 73-69 yo and tobacco use        Lung Cancer Screening:     General: Risks and Benefits Discussed and Screening Not Indicated      Hepatitis C Screening:    General: Screening Current      Airam Nguyen DO

## 2019-12-03 NOTE — PROGRESS NOTES
Assessment/Plan:   1  Hyperlipidemia, unspecified hyperlipidemia type  Patient's fasting lipid panel previously was very well controlled today  This time, continue with a strict low-fat/low-cholesterol diet  Continue as well with current treatment of atorvastatin  - atorvastatin (LIPITOR) 20 mg tablet; Take 1 tablet (20 mg total) by mouth daily  Dispense: 90 tablet; Refill: 1  - Comprehensive metabolic panel; Future  - Lipid Panel with Direct LDL reflex; Future  - TSH, 3rd generation with Free T4 reflex; Future  - Hemoglobin A1C W/Refl To Glycomark(R); Future    2  Enlarged prostate without lower urinary tract symptoms (luts)  Patient's symptoms appear persistent  He still develops mild nocturia  His PSA level appeared stable  Will continue with his current treatment of Cardura  - doxazosin (CARDURA) 4 mg tablet; Take 1 tablet (4 mg total) by mouth daily  Dispense: 90 tablet; Refill: 1  - Comprehensive metabolic panel; Future  - Lipid Panel with Direct LDL reflex; Future  - TSH, 3rd generation with Free T4 reflex; Future  - Hemoglobin A1C W/Refl To Glycomark(R); Future    3  Fear of flying  Patient still has mild anxiety  At this time, will continue with his p r n  Use of his lorazepam   - Comprehensive metabolic panel; Future  - Lipid Panel with Direct LDL reflex; Future  - TSH, 3rd generation with Free T4 reflex; Future  - Hemoglobin A1C W/Refl To Glycomark(R); Future    4  Eustachian tube dysfunction, right  Patient was found to have a right cerumen impaction as well as chronic eustachian tube dysfunction  He may continue with use of his fluticasone nasal spray  - fluticasone (FLONASE) 50 mcg/act nasal spray; 2 sprays into each nostril daily  Dispense: 3 Bottle; Refill: 3  - Comprehensive metabolic panel; Future  - Lipid Panel with Direct LDL reflex; Future  - TSH, 3rd generation with Free T4 reflex; Future  - Hemoglobin A1C W/Refl To Glycomark(R); Future    5   Insomnia, unspecified type  Patient still has mild anxiety specially with falling  He may continue to use his alprazolam p r n  For symptom relief  - Comprehensive metabolic panel; Future  - Lipid Panel with Direct LDL reflex; Future  - TSH, 3rd generation with Free T4 reflex; Future  - Hemoglobin A1C W/Refl To Glycomark(R); Future)    6  Impacted cerumen of right ear  Patient was recently seen by his audiologist   He was informed that he did have right cerumen impaction  He tolerated cerumen removal very well today  Avoid Q-tips in the future  May benefit from treatment with Debrox ear drops  7  Pre-diabetes  Patient's fasting sugars have been stable  At this time, will recheck his A1c level  Continue with a strict diet and exercise plan  - Comprehensive metabolic panel; Future  - Lipid Panel with Direct LDL reflex; Future  - TSH, 3rd generation with Free T4 reflex; Future  - Hemoglobin A1C W/Refl To Glycomark(R); Future        BMI Counseling: Body mass index is 30 13 kg/m²  The BMI is above normal  Nutrition recommendations include decreasing portion sizes, encouraging healthy choices of fruits and vegetables, decreasing fast food intake, consuming healthier snacks, limiting drinks that contain sugar and reducing intake of cholesterol  Exercise recommendations include moderate physical activity 150 minutes/week and strength training exercises  No pharmacotherapy was ordered  Diagnoses and all orders for this visit:    Hyperlipidemia, unspecified hyperlipidemia type  -     atorvastatin (LIPITOR) 20 mg tablet; Take 1 tablet (20 mg total) by mouth daily    Enlarged prostate without lower urinary tract symptoms (luts)  -     doxazosin (CARDURA) 4 mg tablet; Take 1 tablet (4 mg total) by mouth daily          Subjective:       Chief Complaint   Patient presents with    Follow-up     6 month     Medicare Wellness Visit      Patient ID: Davie Hernandez is a 69 y o  male      Patient is a 68-year-old male presents today for follow-up his chronic conditions  He has dyslipidemia, BPH, anxiety/care flying, eustachian tube dysfunction as well as pre diabetes  He has been taking his medications regularly  He denies adverse reactions with medications  He was recently seen by [facility] audiologist   He would like to have cerumen removal today  Review of Systems   Constitutional: Negative for activity change, chills, fatigue and fever  HENT: Negative for congestion, ear pain, sinus pressure and sore throat  Eyes: Negative for redness, itching and visual disturbance  Respiratory: Negative for cough and shortness of breath  Cardiovascular: Negative for chest pain and palpitations  Gastrointestinal: Negative for abdominal pain, diarrhea and nausea  Endocrine: Negative for cold intolerance and heat intolerance  Genitourinary: Negative for dysuria, flank pain and frequency  Musculoskeletal: Negative for arthralgias, back pain, gait problem and myalgias  Skin: Negative for color change  Allergic/Immunologic: Negative for environmental allergies  Neurological: Negative for dizziness, numbness and headaches  Psychiatric/Behavioral: Negative for behavioral problems and sleep disturbance  The following portions of the patient's history were reviewed and updated as appropriate : past family history, past medical history, past social history and past surgical history      Current Outpatient Medications:     aspirin 81 mg chewable tablet, Chew 81 mg every other day , Disp: , Rfl:     atorvastatin (LIPITOR) 20 mg tablet, Take 1 tablet (20 mg total) by mouth daily, Disp: 90 tablet, Rfl: 3    Cholecalciferol (CVS D3) 5000 units capsule, Take 1 tablet by mouth daily, Disp: , Rfl:     doxazosin (CARDURA) 4 mg tablet, Take 1 tablet (4 mg total) by mouth daily, Disp: 90 tablet, Rfl: 3    fluticasone (FLONASE) 50 mcg/act nasal spray, 2 sprays into each nostril daily (Patient not taking: Reported on 12/3/2019), Disp: 3 Bottle, Rfl: 3   LORazepam (ATIVAN) 1 mg tablet, Take 2 tablets (2 mg total) by mouth daily (Patient not taking: Reported on 12/3/2019), Disp: 20 tablet, Rfl: 0    sildenafil (VIAGRA) 100 mg tablet, Take by mouth, Disp: , Rfl:     triamcinolone (KENALOG) 0 5 % cream, Apply topically 2 (two) times a day (Patient not taking: Reported on 12/3/2019), Disp: 30 g, Rfl: 0         Objective:         Vitals:    12/03/19 1018   BP: 118/80   BP Location: Left arm   Patient Position: Sitting   Cuff Size: Large   Pulse: 69   Resp: 17   Temp: 98 6 °F (37 °C)   TempSrc: Tympanic   SpO2: 95%   Weight: 95 3 kg (210 lb)   Height: 5' 10" (1 778 m)     Physical Exam   Constitutional: He is oriented to person, place, and time  He appears well-developed and well-nourished  HENT:   Head: Normocephalic and atraumatic  Nose: Nose normal    Mouth/Throat: No oropharyngeal exudate  Eyes: Pupils are equal, round, and reactive to light  Right eye exhibits no discharge  Left eye exhibits no discharge  Neck: Normal range of motion  Neck supple  No tracheal deviation present  Cardiovascular: Normal rate, regular rhythm and intact distal pulses  Exam reveals no gallop and no friction rub  No murmur heard  Pulses:       Dorsalis pedis pulses are 2+ on the right side, and 2+ on the left side  Posterior tibial pulses are 2+ on the right side, and 2+ on the left side  Pulmonary/Chest: Effort normal and breath sounds normal  No respiratory distress  He has no wheezes  He has no rales  Abdominal: Soft  Bowel sounds are normal  He exhibits no distension  There is no tenderness  There is no rebound and no guarding  Musculoskeletal: Normal range of motion  He exhibits no edema  Lymphadenopathy:        Head (right side): No submental and no submandibular adenopathy present  Head (left side): No submental and no submandibular adenopathy present  He has no cervical adenopathy          Right cervical: No superficial cervical, no deep cervical and no posterior cervical adenopathy present  Left cervical: No superficial cervical, no deep cervical and no posterior cervical adenopathy present  Neurological: He is alert and oriented to person, place, and time  No cranial nerve deficit or sensory deficit  Skin: Skin is warm, dry and intact  Psychiatric: His speech is normal and behavior is normal  Judgment normal  His mood appears not anxious  Cognition and memory are normal  He does not exhibit a depressed mood  Vitals reviewed  Ear cerumen removal  Date/Time: 12/3/2019 10:56 AM  Performed by: Deanna Bateman DO  Authorized by: Deanna Bateman DO     Patient location:  Clinic  Consent:     Consent obtained:  Verbal    Consent given by:  Patient    Alternatives discussed:  No treatment  Procedure details:     Local anesthetic:  None    Location:  R ear    Procedure type: irrigation with insturmentation      Insturmentation: curette      Approach:  External  Post-procedure details:     Complication:  None    Hearing quality:  Normal    Patient tolerance of procedure:   Tolerated well, no immediate complications

## 2020-06-12 LAB
ALBUMIN SERPL-MCNC: 4.3 G/DL (ref 3.6–5.1)
ALBUMIN/GLOB SERPL: 1.8 (CALC) (ref 1–2.5)
ALP SERPL-CCNC: 48 U/L (ref 35–144)
ALT SERPL-CCNC: 24 U/L (ref 9–46)
AST SERPL-CCNC: 17 U/L (ref 10–35)
BILIRUB SERPL-MCNC: 0.8 MG/DL (ref 0.2–1.2)
BUN SERPL-MCNC: 16 MG/DL (ref 7–25)
BUN/CREAT SERPL: ABNORMAL (CALC) (ref 6–22)
CALCIUM SERPL-MCNC: 9.2 MG/DL (ref 8.6–10.3)
CHLORIDE SERPL-SCNC: 104 MMOL/L (ref 98–110)
CHOLEST SERPL-MCNC: 161 MG/DL
CHOLEST/HDLC SERPL: 4.4 (CALC)
CO2 SERPL-SCNC: 30 MMOL/L (ref 20–32)
CREAT SERPL-MCNC: 0.87 MG/DL (ref 0.7–1.18)
GLOBULIN SER CALC-MCNC: 2.4 G/DL (CALC) (ref 1.9–3.7)
GLUCOSE SERPL-MCNC: 109 MG/DL (ref 65–99)
HBA1C MFR BLD: 5.6 % OF TOTAL HGB
HDLC SERPL-MCNC: 37 MG/DL
LDLC SERPL CALC-MCNC: 100 MG/DL (CALC)
NONHDLC SERPL-MCNC: 124 MG/DL (CALC)
POTASSIUM SERPL-SCNC: 3.9 MMOL/L (ref 3.5–5.3)
PROT SERPL-MCNC: 6.7 G/DL (ref 6.1–8.1)
SL AMB EGFR AFRICAN AMERICAN: 100 ML/MIN/1.73M2
SL AMB EGFR NON AFRICAN AMERICAN: 86 ML/MIN/1.73M2
SODIUM SERPL-SCNC: 140 MMOL/L (ref 135–146)
TRIGL SERPL-MCNC: 139 MG/DL
TSH SERPL-ACNC: 3.02 MIU/L (ref 0.4–4.5)

## 2020-06-15 ENCOUNTER — OFFICE VISIT (OUTPATIENT)
Dept: FAMILY MEDICINE CLINIC | Facility: CLINIC | Age: 75
End: 2020-06-15
Payer: COMMERCIAL

## 2020-06-15 VITALS
BODY MASS INDEX: 30.09 KG/M2 | RESPIRATION RATE: 17 BRPM | TEMPERATURE: 98.6 F | SYSTOLIC BLOOD PRESSURE: 130 MMHG | DIASTOLIC BLOOD PRESSURE: 80 MMHG | OXYGEN SATURATION: 96 % | HEIGHT: 70 IN | HEART RATE: 83 BPM | WEIGHT: 210.2 LBS

## 2020-06-15 DIAGNOSIS — H61.23 BILATERAL IMPACTED CERUMEN: ICD-10-CM

## 2020-06-15 DIAGNOSIS — Z12.5 SCREENING FOR PROSTATE CANCER: ICD-10-CM

## 2020-06-15 DIAGNOSIS — N52.9 ERECTILE DYSFUNCTION, UNSPECIFIED ERECTILE DYSFUNCTION TYPE: ICD-10-CM

## 2020-06-15 DIAGNOSIS — F40.243 FEAR OF FLYING: ICD-10-CM

## 2020-06-15 DIAGNOSIS — N40.0 ENLARGED PROSTATE WITHOUT LOWER URINARY TRACT SYMPTOMS (LUTS): ICD-10-CM

## 2020-06-15 DIAGNOSIS — J31.0 CHRONIC RHINITIS: ICD-10-CM

## 2020-06-15 DIAGNOSIS — E78.5 HYPERLIPIDEMIA, UNSPECIFIED HYPERLIPIDEMIA TYPE: Primary | ICD-10-CM

## 2020-06-15 DIAGNOSIS — R73.03 PRE-DIABETES: ICD-10-CM

## 2020-06-15 PROCEDURE — 69210 REMOVE IMPACTED EAR WAX UNI: CPT | Performed by: FAMILY MEDICINE

## 2020-06-15 PROCEDURE — 3008F BODY MASS INDEX DOCD: CPT | Performed by: FAMILY MEDICINE

## 2020-06-15 PROCEDURE — 4040F PNEUMOC VAC/ADMIN/RCVD: CPT | Performed by: FAMILY MEDICINE

## 2020-06-15 PROCEDURE — 99214 OFFICE O/P EST MOD 30 MIN: CPT | Performed by: FAMILY MEDICINE

## 2020-06-15 PROCEDURE — 1160F RVW MEDS BY RX/DR IN RCRD: CPT | Performed by: FAMILY MEDICINE

## 2020-06-15 PROCEDURE — 1036F TOBACCO NON-USER: CPT | Performed by: FAMILY MEDICINE

## 2020-06-15 RX ORDER — MONTELUKAST SODIUM 10 MG/1
10 TABLET ORAL
Qty: 30 TABLET | Refills: 5 | Status: SHIPPED | OUTPATIENT
Start: 2020-06-15 | End: 2021-06-23

## 2020-07-06 ENCOUNTER — TELEPHONE (OUTPATIENT)
Dept: FAMILY MEDICINE CLINIC | Facility: CLINIC | Age: 75
End: 2020-07-06

## 2020-07-06 DIAGNOSIS — J31.0 CHRONIC RHINITIS: ICD-10-CM

## 2020-07-06 DIAGNOSIS — L29.9 EAR ITCHING: Primary | ICD-10-CM

## 2020-07-06 NOTE — TELEPHONE ENCOUNTER
Pt LM stating that when he saw you on 6/15 you started him on Singulair for allergies and ear itching  Pt states his allergies have gotten better however he is still experiencing ear itching  Pt would like to know if he should make an appt to see you or can you refer him to ENT for this issue

## 2020-10-13 ENCOUNTER — IMMUNIZATIONS (OUTPATIENT)
Dept: FAMILY MEDICINE CLINIC | Facility: CLINIC | Age: 75
End: 2020-10-13
Payer: COMMERCIAL

## 2020-10-13 DIAGNOSIS — Z23 NEED FOR VACCINATION: Primary | ICD-10-CM

## 2020-10-13 PROCEDURE — 90662 IIV NO PRSV INCREASED AG IM: CPT | Performed by: FAMILY MEDICINE

## 2020-10-13 PROCEDURE — G0008 ADMIN INFLUENZA VIRUS VAC: HCPCS | Performed by: FAMILY MEDICINE

## 2020-12-05 LAB
ALBUMIN SERPL-MCNC: 4.1 G/DL (ref 3.6–5.1)
ALBUMIN/GLOB SERPL: 1.6 (CALC) (ref 1–2.5)
ALP SERPL-CCNC: 49 U/L (ref 35–144)
ALT SERPL-CCNC: 20 U/L (ref 9–46)
AST SERPL-CCNC: 17 U/L (ref 10–35)
BILIRUB SERPL-MCNC: 0.7 MG/DL (ref 0.2–1.2)
BUN SERPL-MCNC: 19 MG/DL (ref 7–25)
BUN/CREAT SERPL: ABNORMAL (CALC) (ref 6–22)
CALCIUM SERPL-MCNC: 9.3 MG/DL (ref 8.6–10.3)
CHLORIDE SERPL-SCNC: 104 MMOL/L (ref 98–110)
CHOLEST SERPL-MCNC: 153 MG/DL
CHOLEST/HDLC SERPL: 4.3 (CALC)
CO2 SERPL-SCNC: 29 MMOL/L (ref 20–32)
CREAT SERPL-MCNC: 0.87 MG/DL (ref 0.7–1.18)
GLOBULIN SER CALC-MCNC: 2.6 G/DL (CALC) (ref 1.9–3.7)
GLUCOSE SERPL-MCNC: 111 MG/DL (ref 65–99)
HBA1C MFR BLD: 5.7 % OF TOTAL HGB
HDLC SERPL-MCNC: 36 MG/DL
LDLC SERPL CALC-MCNC: 94 MG/DL (CALC)
NONHDLC SERPL-MCNC: 117 MG/DL (CALC)
POTASSIUM SERPL-SCNC: 3.9 MMOL/L (ref 3.5–5.3)
PROT SERPL-MCNC: 6.7 G/DL (ref 6.1–8.1)
PSA SERPL-MCNC: 0.9 NG/ML
SL AMB EGFR AFRICAN AMERICAN: 99 ML/MIN/1.73M2
SL AMB EGFR NON AFRICAN AMERICAN: 86 ML/MIN/1.73M2
SODIUM SERPL-SCNC: 141 MMOL/L (ref 135–146)
TRIGL SERPL-MCNC: 131 MG/DL

## 2020-12-15 ENCOUNTER — OFFICE VISIT (OUTPATIENT)
Dept: FAMILY MEDICINE CLINIC | Facility: CLINIC | Age: 75
End: 2020-12-15
Payer: COMMERCIAL

## 2020-12-15 VITALS
DIASTOLIC BLOOD PRESSURE: 78 MMHG | WEIGHT: 213 LBS | TEMPERATURE: 97.2 F | HEIGHT: 70 IN | BODY MASS INDEX: 30.49 KG/M2 | OXYGEN SATURATION: 96 % | HEART RATE: 74 BPM | SYSTOLIC BLOOD PRESSURE: 136 MMHG | RESPIRATION RATE: 18 BRPM

## 2020-12-15 DIAGNOSIS — N52.9 ERECTILE DYSFUNCTION, UNSPECIFIED ERECTILE DYSFUNCTION TYPE: ICD-10-CM

## 2020-12-15 DIAGNOSIS — E78.5 HYPERLIPIDEMIA, UNSPECIFIED HYPERLIPIDEMIA TYPE: ICD-10-CM

## 2020-12-15 DIAGNOSIS — D69.6 THROMBOCYTOPENIA (HCC): ICD-10-CM

## 2020-12-15 DIAGNOSIS — Z12.5 SCREENING FOR PROSTATE CANCER: ICD-10-CM

## 2020-12-15 DIAGNOSIS — Z00.00 MEDICARE ANNUAL WELLNESS VISIT, SUBSEQUENT: ICD-10-CM

## 2020-12-15 DIAGNOSIS — R73.03 PRE-DIABETES: ICD-10-CM

## 2020-12-15 DIAGNOSIS — J31.0 CHRONIC RHINITIS: Primary | ICD-10-CM

## 2020-12-15 DIAGNOSIS — H69.81 EUSTACHIAN TUBE DYSFUNCTION, RIGHT: ICD-10-CM

## 2020-12-15 DIAGNOSIS — N40.0 ENLARGED PROSTATE WITHOUT LOWER URINARY TRACT SYMPTOMS (LUTS): ICD-10-CM

## 2020-12-15 PROBLEM — M54.16 LUMBAR RADICULAR PAIN: Chronic | Status: RESOLVED | Noted: 2017-06-28 | Resolved: 2020-12-15

## 2020-12-15 PROCEDURE — 99214 OFFICE O/P EST MOD 30 MIN: CPT | Performed by: FAMILY MEDICINE

## 2020-12-15 PROCEDURE — G0439 PPPS, SUBSEQ VISIT: HCPCS | Performed by: FAMILY MEDICINE

## 2020-12-15 RX ORDER — FLUTICASONE PROPIONATE 50 MCG
2 SPRAY, SUSPENSION (ML) NASAL DAILY
Qty: 3 BOTTLE | Refills: 3 | Status: SHIPPED | OUTPATIENT
Start: 2020-12-15

## 2020-12-15 RX ORDER — ATORVASTATIN CALCIUM 20 MG/1
20 TABLET, FILM COATED ORAL DAILY
Qty: 90 TABLET | Refills: 1 | Status: SHIPPED | OUTPATIENT
Start: 2020-12-15 | End: 2021-06-01

## 2020-12-15 RX ORDER — DOXAZOSIN MESYLATE 4 MG/1
4 TABLET ORAL DAILY
Qty: 90 TABLET | Refills: 1 | Status: SHIPPED | OUTPATIENT
Start: 2020-12-15 | End: 2021-03-16

## 2021-03-10 DIAGNOSIS — Z23 ENCOUNTER FOR IMMUNIZATION: ICD-10-CM

## 2021-03-16 DIAGNOSIS — N40.0 ENLARGED PROSTATE WITHOUT LOWER URINARY TRACT SYMPTOMS (LUTS): ICD-10-CM

## 2021-03-16 RX ORDER — DOXAZOSIN MESYLATE 4 MG/1
TABLET ORAL
Qty: 90 TABLET | Refills: 1 | Status: SHIPPED | OUTPATIENT
Start: 2021-03-16 | End: 2021-11-11

## 2021-03-23 ENCOUNTER — IMMUNIZATIONS (OUTPATIENT)
Dept: FAMILY MEDICINE CLINIC | Facility: HOSPITAL | Age: 76
End: 2021-03-23

## 2021-03-23 DIAGNOSIS — Z23 ENCOUNTER FOR IMMUNIZATION: Primary | ICD-10-CM

## 2021-03-23 PROCEDURE — 0011A SARS-COV-2 / COVID-19 MRNA VACCINE (MODERNA) 100 MCG: CPT

## 2021-03-23 PROCEDURE — 91301 SARS-COV-2 / COVID-19 MRNA VACCINE (MODERNA) 100 MCG: CPT

## 2021-04-22 ENCOUNTER — IMMUNIZATIONS (OUTPATIENT)
Dept: FAMILY MEDICINE CLINIC | Facility: HOSPITAL | Age: 76
End: 2021-04-22

## 2021-04-22 DIAGNOSIS — Z23 ENCOUNTER FOR IMMUNIZATION: Primary | ICD-10-CM

## 2021-04-22 PROCEDURE — 0012A SARS-COV-2 / COVID-19 MRNA VACCINE (MODERNA) 100 MCG: CPT

## 2021-04-22 PROCEDURE — 91301 SARS-COV-2 / COVID-19 MRNA VACCINE (MODERNA) 100 MCG: CPT

## 2021-05-31 DIAGNOSIS — E78.5 HYPERLIPIDEMIA, UNSPECIFIED HYPERLIPIDEMIA TYPE: ICD-10-CM

## 2021-06-01 RX ORDER — ATORVASTATIN CALCIUM 20 MG/1
TABLET, FILM COATED ORAL
Qty: 90 TABLET | Refills: 1 | Status: SHIPPED | OUTPATIENT
Start: 2021-06-01 | End: 2021-09-08

## 2021-06-15 NOTE — PROGRESS NOTES
Assessment & Plan:     D69 6 Thrombocytopenia ([facility] [address] )  I have evaluated the patient and found the condition to be: Resolved    K58 9 Irritable bowel syndrome (IBS)  I have evaluated the patient and found the condition to be: Resolved    E78 5 Hyperlipidemia  I have evaluated the patient and found the condition to be: Stable  I have evaluated the patient and: Recommended continue with same treatment plan    M48 062 Lumbar stenosis with neurogenic claudication  I have evaluated the patient and found the condition to be: Resolved    R73 03 Pre-diabetes    N40 0 Enlarged prostate without lower urinary tract symptoms  I have evaluated the patient and found the condition to be: Stable  I have evaluated the patient and: Recommended continue with same treatment plan         Subjective:     Patient ID: Jay Guido is a 70 y o  male   Presents today for his enhanced visit  He denies any complaints    No chief complaint on file  HPI    Review of Systems   Constitutional: Negative for activity change, chills, fatigue and fever  HENT: Negative for congestion, ear pain, sinus pressure and sore throat  Eyes: Negative for redness, itching and visual disturbance  Respiratory: Negative for cough and shortness of breath  Cardiovascular: Negative for chest pain and palpitations  Gastrointestinal: Negative for abdominal pain, diarrhea and nausea  Endocrine: Negative for cold intolerance and heat intolerance  Genitourinary: Negative for dysuria, flank pain and frequency  Musculoskeletal: Negative for arthralgias, back pain, gait problem and myalgias  Skin: Negative for color change  Allergic/Immunologic: Negative for environmental allergies  Neurological: Negative for dizziness, numbness and headaches  Psychiatric/Behavioral: Negative for behavioral problems and sleep disturbance  Objective: There were no vitals taken for this visit      Problem List Items Addressed This Visit     None Physical Exam  Constitutional:       General: He is not in acute distress  Appearance: He is well-developed  He is not ill-appearing, toxic-appearing or diaphoretic  HENT:      Head: Normocephalic and atraumatic  Right Ear: Hearing normal       Left Ear: Hearing normal       Nose: Nose normal    Eyes:      General: Lids are normal       Conjunctiva/sclera: Conjunctivae normal       Right eye: Right conjunctiva is not injected  Left eye: Left conjunctiva is not injected  Pupils: Pupils are equal, round, and reactive to light  Neck:      Trachea: No tracheal deviation  Pulmonary:      Effort: Pulmonary effort is normal  No respiratory distress  Musculoskeletal:         General: Normal range of motion  Cervical back: Normal range of motion  Skin:     Findings: No rash  Neurological:      Mental Status: He is alert and oriented to person, place, and time  Psychiatric:         Behavior: Behavior normal          Thought Content:  Thought content normal          Judgment: Judgment normal

## 2021-06-16 ENCOUNTER — RA CDI HCC (OUTPATIENT)
Dept: OTHER | Facility: HOSPITAL | Age: 76
End: 2021-06-16

## 2021-06-16 NOTE — PROGRESS NOTES
[facility] [employer]  coding opportunities             Chart reviewed, (number of) suggestions sent to provider: 1            Number of suggestions actually used: 0      Number of suggestions NOT actually used: 1     Patients insurance company: [employer] (Medicare Advantage and Commercial)     Visit status: Patient arrived for their scheduled appointment   dx not used: D69 6-listed as resolved in visit notes  Provider never responded to [employer]  coding request     [facility] [facility]  coding opportunities             Chart reviewed, (number of) suggestions sent to provider: 1   DX:  D69  6-Thrombocytopenia, unspecified-unsure on current status-no plt in system except 3 years ago-130                 Patients insurance company: [employer]

## 2021-06-23 ENCOUNTER — OFFICE VISIT (OUTPATIENT)
Dept: FAMILY MEDICINE CLINIC | Facility: CLINIC | Age: 76
End: 2021-06-23
Payer: COMMERCIAL

## 2021-06-23 VITALS
TEMPERATURE: 97.6 F | BODY MASS INDEX: 28.42 KG/M2 | OXYGEN SATURATION: 98 % | HEART RATE: 74 BPM | WEIGHT: 209.8 LBS | DIASTOLIC BLOOD PRESSURE: 88 MMHG | HEIGHT: 72 IN | RESPIRATION RATE: 17 BRPM | SYSTOLIC BLOOD PRESSURE: 130 MMHG

## 2021-06-23 DIAGNOSIS — J31.0 CHRONIC RHINITIS: ICD-10-CM

## 2021-06-23 DIAGNOSIS — E78.5 HYPERLIPIDEMIA, UNSPECIFIED HYPERLIPIDEMIA TYPE: Primary | ICD-10-CM

## 2021-06-23 DIAGNOSIS — N40.0 ENLARGED PROSTATE WITHOUT LOWER URINARY TRACT SYMPTOMS (LUTS): ICD-10-CM

## 2021-06-23 DIAGNOSIS — N52.9 ERECTILE DYSFUNCTION, UNSPECIFIED ERECTILE DYSFUNCTION TYPE: ICD-10-CM

## 2021-06-23 PROCEDURE — T1015 CLINIC SERVICE: HCPCS | Performed by: FAMILY MEDICINE

## 2021-06-23 PROCEDURE — 1160F RVW MEDS BY RX/DR IN RCRD: CPT | Performed by: FAMILY MEDICINE

## 2021-06-23 PROCEDURE — 3725F SCREEN DEPRESSION PERFORMED: CPT | Performed by: FAMILY MEDICINE

## 2021-06-23 PROCEDURE — 1036F TOBACCO NON-USER: CPT | Performed by: FAMILY MEDICINE

## 2021-06-23 PROCEDURE — 3008F BODY MASS INDEX DOCD: CPT | Performed by: FAMILY MEDICINE

## 2021-06-23 PROCEDURE — 99214 OFFICE O/P EST MOD 30 MIN: CPT | Performed by: FAMILY MEDICINE

## 2021-06-23 NOTE — PROGRESS NOTES
Assessment/Plan:   1  Hyperlipidemia, unspecified hyperlipidemia type    Stable  Patient's last fasting lipid panel appeared very well controlled  Continue with a strict low-fat / low-cholesterol as well as current treatment with atorvastatin  2  Enlarged prostate without lower urinary tract symptoms (luts)    Stable  Patient denies any lower urinary tract symptoms  Will continue with current treatment of Cardura  3  Chronic rhinitis    Patient denies any recent exacerbations of his rhinitis peer he discontinued his Singulair  Will continue along with his Flonase  This appears to be effective for him  4  Erectile dysfunction, unspecified erectile dysfunction type    Stable  Will continue with his p r n  use of sildenafil  Follow up with patient in 6 months  BMI Counseling: Body mass index is 28 65 kg/m²  The BMI is above normal  Nutrition recommendations include decreasing portion sizes, encouraging healthy choices of fruits and vegetables, decreasing fast food intake, consuming healthier snacks and limiting drinks that contain sugar  Exercise recommendations include moderate physical activity 150 minutes/week and exercising 3-5 times per week  No pharmacotherapy was ordered  Patient referred to PCP due to patient being overweight  Depression Screening and Follow-up Plan: Patient's depression screening was positive with a PHQ-2 score of 0  Clincally patient does not have depression  No treatment is required  There are no diagnoses linked to this encounter  Subjective:       Chief Complaint   Patient presents with    Follow-up     6 month       Patient ID: Chandan Rooney is a 70 y o  male  Presents today for follow-up on her chronic conditions  He has dyslipidemia, BPH, chronic rhinitis as well as ED  He has been taking his medications regularly  He denies adverse reactions with medications      HPI    Review of Systems   Constitutional: Negative for activity change, chills, fatigue and fever  HENT: Negative for congestion, ear pain, sinus pressure and sore throat  Eyes: Negative for redness, itching and visual disturbance  Respiratory: Negative for cough and shortness of breath  Cardiovascular: Negative for chest pain and palpitations  Gastrointestinal: Negative for abdominal pain, diarrhea and nausea  Endocrine: Negative for cold intolerance and heat intolerance  Genitourinary: Negative for dysuria, flank pain and frequency  Musculoskeletal: Negative for arthralgias, back pain, gait problem and myalgias  Skin: Negative for color change  Allergic/Immunologic: Negative for environmental allergies  Neurological: Negative for dizziness, numbness and headaches  Psychiatric/Behavioral: Negative for behavioral problems and sleep disturbance  The following portions of the patient's history were reviewed and updated as appropriate : past family history, past medical history, past social history and past surgical history      Current Outpatient Medications:     aspirin 81 mg chewable tablet, Chew 81 mg every other day , Disp: , Rfl:     atorvastatin (LIPITOR) 20 mg tablet, TAKE 1 TABLET BY MOUTH EVERY DAY, Disp: 90 tablet, Rfl: 1    Cholecalciferol (CVS D3) 5000 units capsule, Take 1 tablet by mouth daily, Disp: , Rfl:     doxazosin (CARDURA) 4 mg tablet, TAKE 1 TABLET BY MOUTH EVERY DAY, Disp: 90 tablet, Rfl: 1    fluticasone (FLONASE) 50 mcg/act nasal spray, 2 sprays into each nostril daily, Disp: 3 Bottle, Rfl: 3    LORazepam (ATIVAN) 1 mg tablet, Take 2 tablets (2 mg total) by mouth daily, Disp: 20 tablet, Rfl: 0    montelukast (SINGULAIR) 10 mg tablet, Take 1 tablet (10 mg total) by mouth daily at bedtime, Disp: 30 tablet, Rfl: 5    sildenafil (VIAGRA) 100 mg tablet, Take by mouth, Disp: , Rfl:     triamcinolone (KENALOG) 0 5 % cream, Apply topically 2 (two) times a day, Disp: 30 g, Rfl: 0         Objective:         Vitals:    06/23/21 9551 BP: 130/88   BP Location: Left arm   Patient Position: Sitting   Cuff Size: Large   Pulse: 74   Resp: 17   Temp: 97 6 °F (36 4 °C)   TempSrc: Tympanic   SpO2: 98%   Weight: 95 2 kg (209 lb 12 8 oz)   Height: 5' 11 75" (1 822 m)     Physical Exam  Vitals reviewed  Constitutional:       Appearance: He is well-developed  HENT:      Head: Normocephalic and atraumatic  Nose: Nose normal       Mouth/Throat:      Pharynx: No oropharyngeal exudate  Eyes:      General: No scleral icterus  Right eye: No discharge  Left eye: No discharge  Pupils: Pupils are equal, round, and reactive to light  Neck:      Trachea: No tracheal deviation  Cardiovascular:      Rate and Rhythm: Normal rate and regular rhythm  Pulses:           Dorsalis pedis pulses are 2+ on the right side and 2+ on the left side  Posterior tibial pulses are 2+ on the right side and 2+ on the left side  Heart sounds: Normal heart sounds  No murmur heard  No friction rub  No gallop  Pulmonary:      Effort: Pulmonary effort is normal  No respiratory distress  Breath sounds: Normal breath sounds  No wheezing or rales  Abdominal:      General: Bowel sounds are normal  There is no distension  Palpations: Abdomen is soft  Tenderness: There is no abdominal tenderness  There is no guarding or rebound  Musculoskeletal:         General: Normal range of motion  Cervical back: Normal range of motion and neck supple  Lymphadenopathy:      Head:      Right side of head: No submental or submandibular adenopathy  Left side of head: No submental or submandibular adenopathy  Cervical: No cervical adenopathy  Right cervical: No superficial, deep or posterior cervical adenopathy  Left cervical: No superficial, deep or posterior cervical adenopathy  Skin:     General: Skin is warm and dry  Findings: No erythema     Neurological:      Mental Status: He is alert and oriented to person, place, and time  Cranial Nerves: No cranial nerve deficit  Sensory: No sensory deficit  Psychiatric:         Mood and Affect: Mood is not anxious or depressed  Speech: Speech normal          Behavior: Behavior normal          Thought Content:  Thought content normal          Judgment: Judgment normal

## 2021-08-10 ENCOUNTER — OFFICE VISIT (OUTPATIENT)
Dept: FAMILY MEDICINE CLINIC | Facility: CLINIC | Age: 76
End: 2021-08-10
Payer: COMMERCIAL

## 2021-08-10 VITALS
SYSTOLIC BLOOD PRESSURE: 118 MMHG | TEMPERATURE: 98.6 F | HEIGHT: 72 IN | DIASTOLIC BLOOD PRESSURE: 80 MMHG | BODY MASS INDEX: 28.47 KG/M2 | WEIGHT: 210.2 LBS | HEART RATE: 76 BPM | OXYGEN SATURATION: 97 %

## 2021-08-10 DIAGNOSIS — N40.0 ENLARGED PROSTATE WITHOUT LOWER URINARY TRACT SYMPTOMS (LUTS): ICD-10-CM

## 2021-08-10 DIAGNOSIS — R35.0 URINARY FREQUENCY: Primary | ICD-10-CM

## 2021-08-10 LAB
SL AMB  POCT GLUCOSE, UA: NORMAL
SL AMB LEUKOCYTE ESTERASE,UA: NORMAL
SL AMB POCT BILIRUBIN,UA: NORMAL
SL AMB POCT BLOOD,UA: NORMAL
SL AMB POCT CLARITY,UA: CLEAR
SL AMB POCT COLOR,UA: YELLOW
SL AMB POCT KETONES,UA: NORMAL
SL AMB POCT NITRITE,UA: NORMAL
SL AMB POCT PH,UA: 5.5
SL AMB POCT SPECIFIC GRAVITY,UA: 1.01
SL AMB POCT URINE PROTEIN: NORMAL
SL AMB POCT UROBILINOGEN: 0.2

## 2021-08-10 PROCEDURE — 1036F TOBACCO NON-USER: CPT | Performed by: FAMILY MEDICINE

## 2021-08-10 PROCEDURE — 3008F BODY MASS INDEX DOCD: CPT | Performed by: FAMILY MEDICINE

## 2021-08-10 PROCEDURE — 99214 OFFICE O/P EST MOD 30 MIN: CPT | Performed by: FAMILY MEDICINE

## 2021-08-10 PROCEDURE — 1160F RVW MEDS BY RX/DR IN RCRD: CPT | Performed by: FAMILY MEDICINE

## 2021-08-10 PROCEDURE — 81003 URINALYSIS AUTO W/O SCOPE: CPT | Performed by: FAMILY MEDICINE

## 2021-08-10 RX ORDER — TAMSULOSIN HYDROCHLORIDE 0.4 MG/1
0.4 CAPSULE ORAL
Qty: 30 CAPSULE | Refills: 1 | Status: SHIPPED | OUTPATIENT
Start: 2021-08-10 | End: 2021-08-16

## 2021-08-10 NOTE — PROGRESS NOTES
Assessment/Plan:   1  Urinary frequency/Enlarged prostate without lower urinary tract symptoms (luts)   reviewed patient's symptoms today  At this time, is unclear as to exact cause of his urinary frequency  His prostate was enlarged today  At this time it is unclear if this is the cause of his acute urinary frequency  His urinalysis was grossly negative  Will check PSA to further evaluate any prostatic involvement  At this time, he was advised he may highly benefit from seeing Urology  Referral was placed today  He may continue with his Proscar however will add Flomax as well  He was advised to call at the end of the week if his symptoms are persisting   - Ambulatory referral to Urology; Future  - tamsulosin (FLOMAX) 0 4 mg; Take 1 capsule (0 4 mg total) by mouth daily with dinner  Dispense: 30 capsule; Refill: 1  - PSA, total and free; Future  - PSA, total and free  - POCT urine dip auto non-scope             There are no diagnoses linked to this encounter  Subjective:       Chief Complaint   Patient presents with    Difficulty Urinating     started last Thursday       Patient ID: Pedro Pablo Mathews is a 70 y o  male   Presents today with CC of difficulty urinating  He states that he has had urinary frequency as well as difficulty in starting a urinary stream   This started last Thursday  He denies any abdominal discomfort burning pain or blood in the urine  He states that he has been taking his Proscar regularly  He has not taken any further treatments or new supplements  HPI    Review of Systems   Constitutional: Negative for activity change, chills, fatigue and fever  HENT: Negative for congestion, ear pain, sinus pressure and sore throat  Eyes: Negative for redness, itching and visual disturbance  Respiratory: Negative for cough and shortness of breath  Cardiovascular: Negative for chest pain and palpitations  Gastrointestinal: Negative for abdominal pain, diarrhea and nausea  Endocrine: Negative for cold intolerance and heat intolerance  Genitourinary: Positive for frequency  Negative for dysuria and flank pain  Musculoskeletal: Negative for arthralgias, back pain, gait problem and myalgias  Skin: Negative for color change  Allergic/Immunologic: Negative for environmental allergies  Neurological: Negative for dizziness, numbness and headaches  Psychiatric/Behavioral: Negative for behavioral problems and sleep disturbance  The following portions of the patient's history were reviewed and updated as appropriate : past family history, past medical history, past social history and past surgical history  Current Outpatient Medications:     aspirin 81 mg chewable tablet, Chew 81 mg every other day , Disp: , Rfl:     atorvastatin (LIPITOR) 20 mg tablet, TAKE 1 TABLET BY MOUTH EVERY DAY, Disp: 90 tablet, Rfl: 1    Cholecalciferol (CVS D3) 5000 units capsule, Take 1 tablet by mouth daily, Disp: , Rfl:     doxazosin (CARDURA) 4 mg tablet, TAKE 1 TABLET BY MOUTH EVERY DAY, Disp: 90 tablet, Rfl: 1    fluticasone (FLONASE) 50 mcg/act nasal spray, 2 sprays into each nostril daily, Disp: 3 Bottle, Rfl: 3    LORazepam (ATIVAN) 1 mg tablet, Take 2 tablets (2 mg total) by mouth daily, Disp: 20 tablet, Rfl: 0    sildenafil (VIAGRA) 100 mg tablet, Take by mouth, Disp: , Rfl:     triamcinolone (KENALOG) 0 5 % cream, Apply topically 2 (two) times a day, Disp: 30 g, Rfl: 0         Objective:         Vitals:    08/10/21 1829   BP: 118/80   BP Location: Left arm   Patient Position: Sitting   Cuff Size: Large   Pulse: 76   Temp: 98 6 °F (37 °C)   TempSrc: Temporal   SpO2: 97%   Weight: 95 3 kg (210 lb 3 2 oz)   Height: 5' 11 75" (1 822 m)     Physical Exam  Vitals reviewed  Constitutional:       Appearance: Normal appearance  He is well-developed  HENT:      Head: Normocephalic and atraumatic        Right Ear: Hearing normal       Left Ear: Hearing normal       Nose: Nose normal  No septal deviation  Eyes:      General: Lids are normal       Pupils: Pupils are equal, round, and reactive to light  Neck:      Thyroid: No thyroid mass or thyromegaly  Trachea: Trachea normal    Cardiovascular:      Rate and Rhythm: Normal rate  Pulmonary:      Effort: Pulmonary effort is normal  No respiratory distress  Breath sounds: No decreased breath sounds  Abdominal:      Tenderness: There is no guarding  Genitourinary:     Prostate: Enlarged  Not tender and no nodules present  Comments:   Partially measuring approximately 60 g  Musculoskeletal:         General: Normal range of motion  Cervical back: Normal range of motion  Skin:     General: Skin is warm and dry  Neurological:      Mental Status: He is alert and oriented to person, place, and time  Cranial Nerves: No cranial nerve deficit  Sensory: No sensory deficit  Psychiatric:         Speech: Speech normal          Behavior: Behavior normal          Thought Content:  Thought content normal          Judgment: Judgment normal

## 2021-08-13 LAB
PSA FREE MFR SERPL: 33 % (CALC)
PSA FREE SERPL-MCNC: 0.1 NG/ML
PSA SERPL-MCNC: 0.3 NG/ML

## 2021-08-16 ENCOUNTER — TELEPHONE (OUTPATIENT)
Dept: FAMILY MEDICINE CLINIC | Facility: CLINIC | Age: 76
End: 2021-08-16

## 2021-08-16 NOTE — TELEPHONE ENCOUNTER
Pt notified  Pt states he has an appt with Dr Susan Espino but it is not until the end of Sept  Pt is going to call and see if he can get an appt with any of the Urologists sooner because sxs are really bothering him  Will call our office back with any issues

## 2021-08-16 NOTE — TELEPHONE ENCOUNTER
Pt LM stating when he picked up Flomax from pharmacy the pharmacist asked him if he was still taking Cardura and was a little confused why the 2 were prescribed together  When pt was reviewing your note on mychart it states he is taking Proscar so now he is questioning whether he should be taking these meds together and if you realized he is taking Cardura not Proscar

## 2021-09-08 DIAGNOSIS — E78.5 HYPERLIPIDEMIA, UNSPECIFIED HYPERLIPIDEMIA TYPE: ICD-10-CM

## 2021-09-08 RX ORDER — ATORVASTATIN CALCIUM 20 MG/1
TABLET, FILM COATED ORAL
Qty: 90 TABLET | Refills: 1 | Status: SHIPPED | OUTPATIENT
Start: 2021-09-08 | End: 2022-05-02

## 2021-09-22 ENCOUNTER — OFFICE VISIT (OUTPATIENT)
Dept: UROLOGY | Facility: MEDICAL CENTER | Age: 76
End: 2021-09-22
Payer: COMMERCIAL

## 2021-09-22 VITALS
HEART RATE: 63 BPM | WEIGHT: 210 LBS | HEIGHT: 72 IN | BODY MASS INDEX: 28.44 KG/M2 | DIASTOLIC BLOOD PRESSURE: 80 MMHG | SYSTOLIC BLOOD PRESSURE: 120 MMHG

## 2021-09-22 DIAGNOSIS — R35.0 URINARY FREQUENCY: Primary | ICD-10-CM

## 2021-09-22 DIAGNOSIS — N13.8 BPH WITH OBSTRUCTION/LOWER URINARY TRACT SYMPTOMS: ICD-10-CM

## 2021-09-22 DIAGNOSIS — N40.1 BPH WITH OBSTRUCTION/LOWER URINARY TRACT SYMPTOMS: ICD-10-CM

## 2021-09-22 LAB
POST-VOID RESIDUAL VOLUME, ML POC: 31 ML
SL AMB  POCT GLUCOSE, UA: NORMAL
SL AMB LEUKOCYTE ESTERASE,UA: NORMAL
SL AMB POCT BILIRUBIN,UA: NORMAL
SL AMB POCT BLOOD,UA: NORMAL
SL AMB POCT CLARITY,UA: CLEAR
SL AMB POCT COLOR,UA: YELLOW
SL AMB POCT KETONES,UA: NORMAL
SL AMB POCT NITRITE,UA: NORMAL
SL AMB POCT PH,UA: 7
SL AMB POCT SPECIFIC GRAVITY,UA: 1.01
SL AMB POCT URINE PROTEIN: NORMAL
SL AMB POCT UROBILINOGEN: 0.2

## 2021-09-22 PROCEDURE — 51798 US URINE CAPACITY MEASURE: CPT | Performed by: UROLOGY

## 2021-09-22 PROCEDURE — 99214 OFFICE O/P EST MOD 30 MIN: CPT | Performed by: UROLOGY

## 2021-09-22 PROCEDURE — 81003 URINALYSIS AUTO W/O SCOPE: CPT | Performed by: UROLOGY

## 2021-09-22 PROCEDURE — 3008F BODY MASS INDEX DOCD: CPT | Performed by: FAMILY MEDICINE

## 2021-09-23 ENCOUNTER — TELEPHONE (OUTPATIENT)
Dept: UROLOGY | Facility: MEDICAL CENTER | Age: 76
End: 2021-09-23

## 2021-09-23 NOTE — TELEPHONE ENCOUNTER
LM for pt to cb to confirm Cystoscopy appt on 11/11/21 at the [facility] location at [address]

## 2021-09-23 NOTE — TELEPHONE ENCOUNTER
Hi,    Can you please assist? Pt needs a return in about 6 weeks (around 11/3/2021) for Cystoscopy with Dr Steven Sosa  There is an opening for 11/11/21 at 11 am, is that appropriate  Please advise  Thank you!

## 2021-10-03 PROBLEM — N40.1 BPH WITH OBSTRUCTION/LOWER URINARY TRACT SYMPTOMS: Status: ACTIVE | Noted: 2021-10-03

## 2021-10-03 PROBLEM — N13.8 BPH WITH OBSTRUCTION/LOWER URINARY TRACT SYMPTOMS: Status: ACTIVE | Noted: 2021-10-03

## 2021-10-05 ENCOUNTER — IMMUNIZATIONS (OUTPATIENT)
Dept: FAMILY MEDICINE CLINIC | Facility: CLINIC | Age: 76
End: 2021-10-05
Payer: COMMERCIAL

## 2021-10-05 DIAGNOSIS — Z23 NEED FOR INFLUENZA VACCINATION: Primary | ICD-10-CM

## 2021-10-05 PROCEDURE — 90662 IIV NO PRSV INCREASED AG IM: CPT | Performed by: FAMILY MEDICINE

## 2021-10-05 PROCEDURE — G0008 ADMIN INFLUENZA VIRUS VAC: HCPCS | Performed by: FAMILY MEDICINE

## 2021-11-08 DIAGNOSIS — N40.0 ENLARGED PROSTATE WITHOUT LOWER URINARY TRACT SYMPTOMS (LUTS): ICD-10-CM

## 2021-11-08 DIAGNOSIS — R35.0 URINARY FREQUENCY: ICD-10-CM

## 2021-11-08 RX ORDER — FINASTERIDE 5 MG/1
TABLET, FILM COATED ORAL
Qty: 90 TABLET | Refills: 1 | Status: SHIPPED | OUTPATIENT
Start: 2021-11-08 | End: 2021-12-23

## 2021-11-11 ENCOUNTER — PROCEDURE VISIT (OUTPATIENT)
Dept: UROLOGY | Facility: MEDICAL CENTER | Age: 76
End: 2021-11-11
Payer: COMMERCIAL

## 2021-11-11 VITALS
HEIGHT: 72 IN | BODY MASS INDEX: 28.44 KG/M2 | DIASTOLIC BLOOD PRESSURE: 82 MMHG | SYSTOLIC BLOOD PRESSURE: 132 MMHG | WEIGHT: 210 LBS

## 2021-11-11 DIAGNOSIS — Z71.89 OTHER SPECIFIED COUNSELING: ICD-10-CM

## 2021-11-11 DIAGNOSIS — N40.1 BPH WITH OBSTRUCTION/LOWER URINARY TRACT SYMPTOMS: Primary | ICD-10-CM

## 2021-11-11 DIAGNOSIS — N13.8 BPH WITH OBSTRUCTION/LOWER URINARY TRACT SYMPTOMS: Primary | ICD-10-CM

## 2021-11-11 DIAGNOSIS — R68.82 DECREASED LIBIDO: ICD-10-CM

## 2021-11-11 PROCEDURE — 99214 OFFICE O/P EST MOD 30 MIN: CPT | Performed by: UROLOGY

## 2021-11-11 PROCEDURE — 1036F TOBACCO NON-USER: CPT | Performed by: UROLOGY

## 2021-11-11 PROCEDURE — 1160F RVW MEDS BY RX/DR IN RCRD: CPT | Performed by: UROLOGY

## 2021-11-11 PROCEDURE — 3008F BODY MASS INDEX DOCD: CPT | Performed by: UROLOGY

## 2021-11-11 RX ORDER — TAMSULOSIN HYDROCHLORIDE 0.4 MG/1
0.4 CAPSULE ORAL
Qty: 90 CAPSULE | Refills: 3 | Status: ON HOLD | OUTPATIENT
Start: 2021-11-11 | End: 2022-02-15

## 2021-12-10 ENCOUNTER — RA CDI HCC (OUTPATIENT)
Dept: OTHER | Facility: HOSPITAL | Age: 76
End: 2021-12-10

## 2021-12-10 NOTE — PROGRESS NOTES
[facility] [address]  coding opportunities          Number of diagnosis code(s) already on the problem list added to FYI fla               Number of suggestions used: 1         Patients insurance company: [employer] (Medicare Advantage and Commercial)     Visit status: Patient arrived for their scheduled appointment        [facility]          Number of diagnosis code(s) already on the problem list added to [employer] fla                     Patients insurance company: [employer] (Medicare Advantage and Commercial)           Appt on 2021  Please refer to 12/15/2020 when this was last assessed - please review for current status and assess if applicable     Z99 5: Thrombocytopenia ([facility] [address] )

## 2021-12-17 LAB
ALBUMIN SERPL-MCNC: 4.2 G/DL (ref 3.6–5.1)
ALBUMIN/GLOB SERPL: 1.8 (CALC) (ref 1–2.5)
ALP SERPL-CCNC: 45 U/L (ref 35–144)
ALT SERPL-CCNC: 24 U/L (ref 9–46)
AST SERPL-CCNC: 20 U/L (ref 10–35)
BILIRUB SERPL-MCNC: 0.8 MG/DL (ref 0.2–1.2)
BUN SERPL-MCNC: 18 MG/DL (ref 7–25)
BUN/CREAT SERPL: ABNORMAL (CALC) (ref 6–22)
CALCIUM SERPL-MCNC: 9.2 MG/DL (ref 8.6–10.3)
CHLORIDE SERPL-SCNC: 104 MMOL/L (ref 98–110)
CHOLEST SERPL-MCNC: 169 MG/DL
CHOLEST/HDLC SERPL: 4.1 (CALC)
CO2 SERPL-SCNC: 29 MMOL/L (ref 20–32)
CREAT SERPL-MCNC: 0.87 MG/DL (ref 0.7–1.18)
GLOBULIN SER CALC-MCNC: 2.4 G/DL (CALC) (ref 1.9–3.7)
GLUCOSE SERPL-MCNC: 104 MG/DL (ref 65–99)
HBA1C MFR BLD: 5.5 % OF TOTAL HGB
HDLC SERPL-MCNC: 41 MG/DL
LDLC SERPL CALC-MCNC: 101 MG/DL (CALC)
NONHDLC SERPL-MCNC: 128 MG/DL (CALC)
POTASSIUM SERPL-SCNC: 4 MMOL/L (ref 3.5–5.3)
PROT SERPL-MCNC: 6.6 G/DL (ref 6.1–8.1)
SL AMB EGFR AFRICAN AMERICAN: 99 ML/MIN/1.73M2
SL AMB EGFR NON AFRICAN AMERICAN: 85 ML/MIN/1.73M2
SODIUM SERPL-SCNC: 140 MMOL/L (ref 135–146)
TRIGL SERPL-MCNC: 168 MG/DL

## 2021-12-23 ENCOUNTER — OFFICE VISIT (OUTPATIENT)
Dept: FAMILY MEDICINE CLINIC | Facility: CLINIC | Age: 76
End: 2021-12-23
Payer: COMMERCIAL

## 2021-12-23 VITALS
HEART RATE: 78 BPM | TEMPERATURE: 97.8 F | BODY MASS INDEX: 29.07 KG/M2 | HEIGHT: 72 IN | SYSTOLIC BLOOD PRESSURE: 130 MMHG | OXYGEN SATURATION: 97 % | DIASTOLIC BLOOD PRESSURE: 78 MMHG | WEIGHT: 214.6 LBS

## 2021-12-23 DIAGNOSIS — Z00.00 MEDICARE ANNUAL WELLNESS VISIT, SUBSEQUENT: Primary | ICD-10-CM

## 2021-12-23 DIAGNOSIS — Z12.5 SCREENING FOR PROSTATE CANCER: ICD-10-CM

## 2021-12-23 DIAGNOSIS — D69.6 THROMBOCYTOPENIA (HCC): ICD-10-CM

## 2021-12-23 DIAGNOSIS — N40.0 ENLARGED PROSTATE WITHOUT LOWER URINARY TRACT SYMPTOMS (LUTS): ICD-10-CM

## 2021-12-23 DIAGNOSIS — E78.5 HYPERLIPIDEMIA, UNSPECIFIED HYPERLIPIDEMIA TYPE: ICD-10-CM

## 2021-12-23 DIAGNOSIS — R73.03 PRE-DIABETES: ICD-10-CM

## 2021-12-23 PROCEDURE — 3725F SCREEN DEPRESSION PERFORMED: CPT | Performed by: FAMILY MEDICINE

## 2021-12-23 PROCEDURE — 3008F BODY MASS INDEX DOCD: CPT | Performed by: FAMILY MEDICINE

## 2021-12-23 PROCEDURE — 1125F AMNT PAIN NOTED PAIN PRSNT: CPT | Performed by: FAMILY MEDICINE

## 2021-12-23 PROCEDURE — 99214 OFFICE O/P EST MOD 30 MIN: CPT | Performed by: FAMILY MEDICINE

## 2021-12-23 PROCEDURE — 1170F FXNL STATUS ASSESSED: CPT | Performed by: FAMILY MEDICINE

## 2021-12-23 PROCEDURE — 1036F TOBACCO NON-USER: CPT | Performed by: FAMILY MEDICINE

## 2021-12-23 PROCEDURE — G0439 PPPS, SUBSEQ VISIT: HCPCS | Performed by: FAMILY MEDICINE

## 2021-12-23 PROCEDURE — 3288F FALL RISK ASSESSMENT DOCD: CPT | Performed by: FAMILY MEDICINE

## 2021-12-23 PROCEDURE — 1160F RVW MEDS BY RX/DR IN RCRD: CPT | Performed by: FAMILY MEDICINE

## 2022-02-02 ENCOUNTER — PROCEDURE VISIT (OUTPATIENT)
Dept: UROLOGY | Facility: MEDICAL CENTER | Age: 77
End: 2022-02-02
Payer: COMMERCIAL

## 2022-02-02 VITALS
HEART RATE: 68 BPM | WEIGHT: 210 LBS | BODY MASS INDEX: 29.4 KG/M2 | DIASTOLIC BLOOD PRESSURE: 78 MMHG | SYSTOLIC BLOOD PRESSURE: 128 MMHG | HEIGHT: 71 IN

## 2022-02-02 DIAGNOSIS — N13.8 BPH WITH URINARY OBSTRUCTION: Primary | ICD-10-CM

## 2022-02-02 DIAGNOSIS — N52.8 MIXED ERECTILE DYSFUNCTION: ICD-10-CM

## 2022-02-02 DIAGNOSIS — N40.1 BPH WITH URINARY OBSTRUCTION: Primary | ICD-10-CM

## 2022-02-02 PROCEDURE — 1036F TOBACCO NON-USER: CPT | Performed by: UROLOGY

## 2022-02-02 PROCEDURE — 99214 OFFICE O/P EST MOD 30 MIN: CPT | Performed by: UROLOGY

## 2022-02-02 PROCEDURE — 1160F RVW MEDS BY RX/DR IN RCRD: CPT | Performed by: UROLOGY

## 2022-02-02 PROCEDURE — 76872 US TRANSRECTAL: CPT | Performed by: UROLOGY

## 2022-02-02 RX ORDER — TADALAFIL 20 MG/1
20 TABLET ORAL DAILY PRN
Qty: 15 TABLET | Refills: 3 | Status: SHIPPED | OUTPATIENT
Start: 2022-02-02

## 2022-02-02 NOTE — PROGRESS NOTES
HISTORY:    BPH, see prior notes regarding no help with all the different BPH meds he has tried  Nocturia times 2-3, daytime every 2 hours or so, slow flow, hard to start, sense of poor emptying    ED, only mild help from sildenafil and gets very flushed in the face which worries him  Also decreased interest in sex   ASSESSMENT / PLAN:    Prostate 66 mL volume, significant median lobe on ultrasound  Cysto shows significant large minute     Options discussed  In my opinion, TURP would be the best choice for him  I think his prostate and median lobe is too big for effective Uro lift  Also discussed laser and steam therapy  Patient like to schedule TURP potential complications discussed  The following portions of the patient's history were reviewed and updated as appropriate: allergies, current medications, past family history, past medical history, past social history, past surgical history and problem list     Review of Systems   All other systems reviewed and are negative  Objective:   Prostate ultrasound performed 66 mL volume  Physical Exam  Constitutional:       General: He is not in acute distress  Appearance: He is well-developed  He is not diaphoretic  HENT:      Head: Normocephalic and atraumatic  Eyes:      General: No scleral icterus  Pulmonary:      Effort: Pulmonary effort is normal    Skin:     Coloration: Skin is not pale  Neurological:      Mental Status: He is alert and oriented to person, place, and time  Psychiatric:         Behavior: Behavior normal          Thought Content:  Thought content normal          Judgment: Judgment normal            0   Lab Value Date/Time    PSA 0 3 08/11/2021 1056    PSA 0 9 12/04/2020 0902    PSA 0 6 11/19/2019 0853    PSA 0 5 11/14/2017 1006   ]  BUN   Date Value Ref Range Status   12/16/2021 18 7 - 25 mg/dL Final     Creatinine   Date Value Ref Range Status   12/16/2021 0 87 0 70 - 1 18 mg/dL Final     Comment: For patients >52years of age, the reference limit  for Creatinine is approximately 13% higher for people  identified as -American         11/14/2017 0 78 0 60 - 1 30 mg/dL Final     Comment:     Standardized to IDMS reference method   12/01/2016 0 92 0 70 - 1 25 mg/dL Final     Comment:     Result Comment: For patients >52years of age, the reference limit  for Creatinine is approximately 13% higher for people  identified as -American  No components found for: CBC      Patient Active Problem List   Diagnosis    Lumbar stenosis with neurogenic claudication    Vitamin D deficiency    Eustachian tube dysfunction, right    Claustrophobia    Mixed erectile dysfunction    Hyperlipidemia    Insomnia    Irritable bowel syndrome (IBS)    Pre-diabetes    Chronic rhinitis    Thrombocytopenia (HCC)    BPH with urinary obstruction    Decreased libido        Diagnoses and all orders for this visit:    BPH with urinary obstruction    Mixed erectile dysfunction  -     tadalafil (CIALIS) 20 MG tablet; Take 1 tablet (20 mg total) by mouth daily as needed for erectile dysfunction           Patient ID: Indira Desouza is a 78 y o  male        Current Outpatient Medications:     atorvastatin (LIPITOR) 20 mg tablet, TAKE 1 TABLET BY MOUTH EVERY DAY, Disp: 90 tablet, Rfl: 1    Cholecalciferol (CVS D3) 5000 units capsule, Take 1 tablet by mouth daily, Disp: , Rfl:     fluticasone (FLONASE) 50 mcg/act nasal spray, 2 sprays into each nostril daily, Disp: 3 Bottle, Rfl: 3    tamsulosin (FLOMAX) 0 4 mg, Take 1 capsule (0 4 mg total) by mouth daily with dinner, Disp: 90 capsule, Rfl: 3    tadalafil (CIALIS) 20 MG tablet, Take 1 tablet (20 mg total) by mouth daily as needed for erectile dysfunction, Disp: 15 tablet, Rfl: 3    triamcinolone (KENALOG) 0 5 % cream, Apply topically 2 (two) times a day (Patient not taking: Reported on 2/2/2022 ), Disp: 30 g, Rfl: 0    Past Medical History:   Diagnosis Date  Allergic rhinitis due to pollen     last assessed: May 31, 2016    Atypical pigmented skin lesion     last assessed: May 11, 2015    Enlarged prostate     GERD (gastroesophageal reflux disease)     Hemorrhoids     last assessed: May 19, 2014    Hyperlipidemia     Malignant neoplasm of skin     BCCA    Persistent dry cough     last assessed/resolved: July 21, 2015    PONV (postoperative nausea and vomiting)     Wears hearing aid     resolved: June 19, 2017       Past Surgical History:   Procedure Laterality Date    CARPAL TUNNEL RELEASE Bilateral     CATARACT EXTRACTION Bilateral     COLONOSCOPY      HEMORRHOID SURGERY      NEUROPLASTY / TRANSPOSITION MEDIAN NERVE AT CARPAL TUNNEL      OTHER SURGICAL HISTORY      Basal Cell    W[place] LAMNOTMY INCL W/DCMPRSN NRV ROOT 1 INTRSPC LUMBR N/A 6/28/2017    Procedure: L4/5 MINIMALLY INVASIVE BILATERAL LAMINOTOMY FROM A LEFT SIDED APPROACH AND MICRODISCECTOMY WITH POSSIBLE EPIDURAL STEROID INJECTION;  Surgeon: Joselyn Hyatt MD;  Location: [facility];  Service: Neurosurgery    TONSILLECTOMY         Social History

## 2022-02-02 NOTE — PROGRESS NOTES
Biopsy prostate     Date/Time 2/2/2022 2:41 PM     Performed by  Pako Knapp MD     Authorized by Pako Knapp MD      Procedure Details   Procedure Notes: Prostate ultrasound for volume, no biopsy performed    66 mL volume, significant median lobe component

## 2022-02-03 ENCOUNTER — TELEPHONE (OUTPATIENT)
Dept: UROLOGY | Facility: MEDICAL CENTER | Age: 77
End: 2022-02-03

## 2022-02-03 NOTE — TELEPHONE ENCOUNTER
I spoke to the patient and schedule his TURP with Dr Daniella Franklin for 2/15/2022 at [facility]      -instructions given verbally and Emailed  -T&S, CMP, CBC, Urien C&S and EKG 2 weeks prior  -MCR/Cigannel - no auth required

## 2022-02-07 ENCOUNTER — HOSPITAL ENCOUNTER (OUTPATIENT)
Dept: NON INVASIVE DIAGNOSTICS | Facility: HOSPITAL | Age: 77
Discharge: HOME | End: 2022-02-07
Attending: UROLOGY
Payer: COMMERCIAL

## 2022-02-07 ENCOUNTER — APPOINTMENT (OUTPATIENT)
Dept: LAB | Facility: HOSPITAL | Age: 77
End: 2022-02-07
Attending: UROLOGY
Payer: COMMERCIAL

## 2022-02-07 DIAGNOSIS — N13.8 BPH WITH URINARY OBSTRUCTION: ICD-10-CM

## 2022-02-07 DIAGNOSIS — Z01.810 PRE-OPERATIVE CARDIOVASCULAR EXAMINATION: ICD-10-CM

## 2022-02-07 DIAGNOSIS — N40.1 BPH WITH URINARY OBSTRUCTION: ICD-10-CM

## 2022-02-07 DIAGNOSIS — R39.89 SUSPECTED UTI: ICD-10-CM

## 2022-02-07 DIAGNOSIS — Z01.812 PRE-OPERATIVE LABORATORY EXAMINATION: ICD-10-CM

## 2022-02-07 LAB
ABO GROUP BLD: NORMAL
ALBUMIN SERPL BCP-MCNC: 3.9 G/DL (ref 3.5–5)
ALP SERPL-CCNC: 47 U/L (ref 46–116)
ALT SERPL W P-5'-P-CCNC: 35 U/L (ref 12–78)
ANION GAP SERPL CALCULATED.3IONS-SCNC: 9 MMOL/L (ref 4–13)
AST SERPL W P-5'-P-CCNC: 16 U/L (ref 5–45)
ATRIAL RATE: 69 BPM
BASOPHILS # BLD AUTO: 0.02 THOUSANDS/ΜL (ref 0–0.1)
BASOPHILS NFR BLD AUTO: 0 % (ref 0–1)
BILIRUB SERPL-MCNC: 0.66 MG/DL (ref 0.2–1)
BLD GP AB SCN SERPL QL: NEGATIVE
BUN SERPL-MCNC: 24 MG/DL (ref 5–25)
CALCIUM SERPL-MCNC: 8.9 MG/DL (ref 8.3–10.1)
CHLORIDE SERPL-SCNC: 106 MMOL/L (ref 100–108)
CO2 SERPL-SCNC: 26 MMOL/L (ref 21–32)
CREAT SERPL-MCNC: 0.99 MG/DL (ref 0.6–1.3)
EOSINOPHIL # BLD AUTO: 0.13 THOUSAND/ΜL (ref 0–0.61)
EOSINOPHIL NFR BLD AUTO: 2 % (ref 0–6)
ERYTHROCYTE [DISTWIDTH] IN BLOOD BY AUTOMATED COUNT: 13.2 % (ref 11.6–15.1)
GFR SERPL CREATININE-BSD FRML MDRD: 74 ML/MIN/1.73SQ M
GLUCOSE SERPL-MCNC: 113 MG/DL (ref 65–140)
HCT VFR BLD AUTO: 43.3 % (ref 36.5–49.3)
HGB BLD-MCNC: 14.4 G/DL (ref 12–17)
IMM GRANULOCYTES # BLD AUTO: 0.01 THOUSAND/UL (ref 0–0.2)
IMM GRANULOCYTES NFR BLD AUTO: 0 % (ref 0–2)
LYMPHOCYTES # BLD AUTO: 1.4 THOUSANDS/ΜL (ref 0.6–4.47)
LYMPHOCYTES NFR BLD AUTO: 16 % (ref 14–44)
MCH RBC QN AUTO: 30.8 PG (ref 26.8–34.3)
MCHC RBC AUTO-ENTMCNC: 33.3 G/DL (ref 31.4–37.4)
MCV RBC AUTO: 93 FL (ref 82–98)
MONOCYTES # BLD AUTO: 0.55 THOUSAND/ΜL (ref 0.17–1.22)
MONOCYTES NFR BLD AUTO: 6 % (ref 4–12)
NEUTROPHILS # BLD AUTO: 6.55 THOUSANDS/ΜL (ref 1.85–7.62)
NEUTS SEG NFR BLD AUTO: 76 % (ref 43–75)
NRBC BLD AUTO-RTO: 0 /100 WBCS
P AXIS: 18 DEGREES
PLATELET # BLD AUTO: 145 THOUSANDS/UL (ref 149–390)
PMV BLD AUTO: 11.2 FL (ref 8.9–12.7)
POTASSIUM SERPL-SCNC: 4.5 MMOL/L (ref 3.5–5.3)
PR INTERVAL: 156 MS
PROT SERPL-MCNC: 7.4 G/DL (ref 6.4–8.2)
QRS AXIS: 34 DEGREES
QRSD INTERVAL: 80 MS
QT INTERVAL: 396 MS
QTC INTERVAL: 424 MS
RBC # BLD AUTO: 4.68 MILLION/UL (ref 3.88–5.62)
RH BLD: POSITIVE
SODIUM SERPL-SCNC: 141 MMOL/L (ref 136–145)
SPECIMEN EXPIRATION DATE: NORMAL
T WAVE AXIS: 49 DEGREES
VENTRICULAR RATE: 69 BPM
WBC # BLD AUTO: 8.66 THOUSAND/UL (ref 4.31–10.16)

## 2022-02-07 PROCEDURE — 85025 COMPLETE CBC W/AUTO DIFF WBC: CPT

## 2022-02-07 PROCEDURE — 93005 ELECTROCARDIOGRAM TRACING: CPT

## 2022-02-07 PROCEDURE — 86850 RBC ANTIBODY SCREEN: CPT

## 2022-02-07 PROCEDURE — 86901 BLOOD TYPING SEROLOGIC RH(D): CPT

## 2022-02-07 PROCEDURE — 80053 COMPREHEN METABOLIC PANEL: CPT

## 2022-02-07 PROCEDURE — 93010 ELECTROCARDIOGRAM REPORT: CPT | Performed by: INTERNAL MEDICINE

## 2022-02-07 PROCEDURE — 86900 BLOOD TYPING SEROLOGIC ABO: CPT

## 2022-02-07 PROCEDURE — 87086 URINE CULTURE/COLONY COUNT: CPT

## 2022-02-07 PROCEDURE — 36415 COLL VENOUS BLD VENIPUNCTURE: CPT

## 2022-02-08 LAB — BACTERIA UR CULT: NORMAL

## 2022-02-08 RX ORDER — CLOBETASOL PROPIONATE 0.5 MG/G
1 CREAM TOPICAL 2 TIMES DAILY
COMMUNITY

## 2022-02-08 NOTE — PRE-PROCEDURE INSTRUCTIONS
Pre-Surgery Instructions:   Medication Instructions    atorvastatin (LIPITOR) 20 mg tablet Instructed patient per Anesthesia Guidelines  take 2/15    Cholecalciferol (CVS D3) 5000 units capsule Instructed patient per Anesthesia Guidelines  stop 2/8    clobetasol (TEMOVATE) 0 05 % cream Instructed patient per Anesthesia Guidelines  hold 2/15    fluticasone (FLONASE) 50 mcg/act nasal spray Instructed patient per Anesthesia Guidelines  may use 2/15    tamsulosin (FLOMAX) 0 4 mg Instructed patient per Anesthesia Guidelines  take 2/15   Pre procedure instructions reviewed verbalizes understanding  NPO after MN  Bathing reviewed  Morning meds with water  No NSAIDS  Pre procedure instructions reviewed verbalizes understanding  NPO after MN    No NSAIDS   Stop supplements/vitamins

## 2022-02-13 ENCOUNTER — ANESTHESIA EVENT (OUTPATIENT)
Dept: PERIOP | Facility: HOSPITAL | Age: 77
DRG: 713 | End: 2022-02-13
Payer: COMMERCIAL

## 2022-02-14 PROCEDURE — NC001 PR NO CHARGE: Performed by: UROLOGY

## 2022-02-14 NOTE — H&P
HISTORY AND PHYSICAL  ? ? Patient Name: Mishel Blackwood  Patient MRN: [id number]  Attending Provider: Damian Lopez MD  Service: Urology  Chief Complaint    BPH    HPI   Mishel Blackwood is a 78 y o  male with long-term BPH, poor response to medications   I plan TURP  Potential risks and complications discussed, and informed consent was given by the patient  Medications  Meds/Allergies   No current facility-administered medications for this encounter  Cannot display prior to admission medications because the patient has not been admitted in this contact  No current facility-administered medications for this encounter      Current Outpatient Medications:     atorvastatin (LIPITOR) 20 mg tablet, TAKE 1 TABLET BY MOUTH EVERY DAY, Disp: 90 tablet, Rfl: 1    Cholecalciferol ([employer] D3) 5000 units capsule, Take 1 tablet by mouth daily, Disp: , Rfl:     clobetasol (TEMOVATE) 0 05 % cream, Apply topically 2 (two) times a day, Disp: , Rfl:     fluticasone (FLONASE) 50 mcg/act nasal spray, 2 sprays into each nostril daily, Disp: 3 Bottle, Rfl: 3    tamsulosin (FLOMAX) 0 4 mg, Take 1 capsule (0 4 mg total) by mouth daily with dinner, Disp: 90 capsule, Rfl: 3    tadalafil (CIALIS) 20 MG tablet, Take 1 tablet (20 mg total) by mouth daily as needed for erectile dysfunction, Disp: 15 tablet, Rfl: 3    triamcinolone (KENALOG) 0 5 % cream, Apply topically 2 (two) times a day (Patient not taking: Reported on 2/2/2022 ), Disp: 30 g, Rfl: 0  Review of Systems  10 point review of systems negative except as noted in HPI  Allergies  Allergies   Allergen Reactions    Latex Rash     Seems Ok with paper tape but patient thinks allergic to both latex and adhesive    Other Rash     Adhesive tapes     PMH  Past Medical History:   Diagnosis Date    Allergic rhinitis due to pollen     last assessed: May 31, 2016    Atypical pigmented skin lesion     last assessed: May 11, 2015    Enlarged prostate     GERD (gastroesophageal reflux disease)     Hemorrhoids     last assessed: May 19, 2014    Hyperlipidemia     Malignant neoplasm of skin     BCCA    Persistent dry cough     last assessed/resolved: 2015    PONV (postoperative nausea and vomiting)     Wears hearing aid     resolved: 2017     Past surgical history  Past Surgical History:   Procedure Laterality Date    BACK SURGERY      CARPAL TUNNEL RELEASE Bilateral     CATARACT EXTRACTION Bilateral     COLONOSCOPY      HEMORRHOID SURGERY      NEUROPLASTY / TRANSPOSITION MEDIAN NERVE AT CARPAL TUNNEL      OTHER SURGICAL HISTORY      Basal Cell    MO LAMNOTMY INCL W/DCMPRSN NRV ROOT 1 INTRSPC LUMBR N/A 2017    Procedure: L4/5 MINIMALLY INVASIVE BILATERAL LAMINOTOMY FROM A LEFT SIDED APPROACH AND MICRODISCECTOMY WITH POSSIBLE EPIDURAL STEROID INJECTION;  Surgeon: Sukhjinder Jensen MD;  Location: [facility];  Service: Neurosurgery    TONSILLECTOMY       Social history  Social History     Tobacco Use    Smoking status: Former Smoker     Quit date:      Years since quittin     Smokeless tobacco: Never Used   Vaping Use    Vaping Use: Never used   Substance Use Topics    Alcohol use:  Yes     Alcohol/week: 7 0 standard drinks     Types: 7 Shots of liquor per week     Comment: before dinner - Social     Drug use: No     ?  Physical Exam     vs  General appearance: alert and oriented, in no acute distress  Head: Normocephalic, without obvious abnormality, atraumatic  Throat: lips, mucosa, and tongue normal; teeth and gums normal  Lungs: clear to auscultation bilaterally  Heart: regular rate and rhythm, S1, S2 normal, no murmur, click, rub or gallop  Abdomen: soft, non-tender; bowel sounds normal; no masses,  no organomegaly  Extremities: extremities normal, warm and well-perfused; no cyanosis, clubbing, or edema  Pelon Gallagher MD

## 2022-02-15 ENCOUNTER — HOSPITAL ENCOUNTER (INPATIENT)
Facility: HOSPITAL | Age: 77
LOS: 2 days | Discharge: HOME | DRG: 713 | End: 2022-02-18
Attending: UROLOGY | Admitting: UROLOGY
Payer: COMMERCIAL

## 2022-02-15 ENCOUNTER — ANESTHESIA (OUTPATIENT)
Dept: PERIOP | Facility: HOSPITAL | Age: 77
DRG: 713 | End: 2022-02-15
Payer: COMMERCIAL

## 2022-02-15 DIAGNOSIS — N40.1 BPH WITH URINARY OBSTRUCTION: ICD-10-CM

## 2022-02-15 DIAGNOSIS — N13.8 BPH WITH URINARY OBSTRUCTION: ICD-10-CM

## 2022-02-15 LAB
FLUAV RNA RESP QL NAA+PROBE: NEGATIVE
FLUBV RNA RESP QL NAA+PROBE: NEGATIVE
RSV RNA RESP QL NAA+PROBE: NEGATIVE
SARS-COV-2 RNA RESP QL NAA+PROBE: NEGATIVE

## 2022-02-15 PROCEDURE — 88305 TISSUE EXAM BY PATHOLOGIST: CPT | Performed by: PATHOLOGY

## 2022-02-15 PROCEDURE — 52601 PROSTATECTOMY (TURP): CPT | Performed by: UROLOGY

## 2022-02-15 PROCEDURE — 0VB07ZZ EXCISION OF PROSTATE, VIA NATURAL OR ARTIFICIAL OPENING: ICD-10-PCS | Performed by: UROLOGY

## 2022-02-15 PROCEDURE — 0241U HB NFCT DS VIR RESP RNA 4 TRGT: CPT | Performed by: ANESTHESIOLOGY

## 2022-02-15 RX ORDER — SODIUM CHLORIDE, SODIUM LACTATE, POTASSIUM CHLORIDE, CALCIUM CHLORIDE 600; 310; 30; 20 MG/100ML; MG/100ML; MG/100ML; MG/100ML
25 INJECTION, SOLUTION INTRAVENOUS CONTINUOUS
Status: DISCONTINUED | OUTPATIENT
Start: 2022-02-15 | End: 2022-02-18 | Stop reason: HOSPADM

## 2022-02-15 RX ORDER — CEFAZOLIN SODIUM 2 G/50ML
2000 SOLUTION INTRAVENOUS ONCE
Status: COMPLETED | OUTPATIENT
Start: 2022-02-15 | End: 2022-02-15

## 2022-02-15 RX ORDER — ACETAMINOPHEN 325 MG/1
650 TABLET ORAL EVERY 6 HOURS SCHEDULED
Status: DISCONTINUED | OUTPATIENT
Start: 2022-02-15 | End: 2022-02-18 | Stop reason: HOSPADM

## 2022-02-15 RX ORDER — SODIUM CHLORIDE, SODIUM LACTATE, POTASSIUM CHLORIDE, CALCIUM CHLORIDE 600; 310; 30; 20 MG/100ML; MG/100ML; MG/100ML; MG/100ML
125 INJECTION, SOLUTION INTRAVENOUS CONTINUOUS
Status: DISCONTINUED | OUTPATIENT
Start: 2022-02-15 | End: 2022-02-17

## 2022-02-15 RX ORDER — SORBITOL 30 G/1000ML
IRRIGANT IRRIGATION AS NEEDED
Status: DISCONTINUED | OUTPATIENT
Start: 2022-02-15 | End: 2022-02-15 | Stop reason: HOSPADM

## 2022-02-15 RX ORDER — LANOLIN ALCOHOL/MO/W.PET/CERES
6 CREAM (GRAM) TOPICAL
Status: DISCONTINUED | OUTPATIENT
Start: 2022-02-15 | End: 2022-02-18 | Stop reason: HOSPADM

## 2022-02-15 RX ORDER — FLUTICASONE PROPIONATE 50 MCG
2 SPRAY, SUSPENSION (ML) NASAL DAILY PRN
Status: DISCONTINUED | OUTPATIENT
Start: 2022-02-15 | End: 2022-02-18 | Stop reason: HOSPADM

## 2022-02-15 RX ORDER — FENTANYL CITRATE 50 UG/ML
INJECTION, SOLUTION INTRAMUSCULAR; INTRAVENOUS AS NEEDED
Status: DISCONTINUED | OUTPATIENT
Start: 2022-02-15 | End: 2022-02-15

## 2022-02-15 RX ORDER — ONDANSETRON 2 MG/ML
4 INJECTION INTRAMUSCULAR; INTRAVENOUS ONCE AS NEEDED
Status: DISCONTINUED | OUTPATIENT
Start: 2022-02-15 | End: 2022-02-15 | Stop reason: HOSPADM

## 2022-02-15 RX ORDER — CEFAZOLIN SODIUM 1 G/50ML
1000 SOLUTION INTRAVENOUS EVERY 8 HOURS
Status: COMPLETED | OUTPATIENT
Start: 2022-02-15 | End: 2022-02-16

## 2022-02-15 RX ORDER — FENTANYL CITRATE/PF 50 MCG/ML
25 SYRINGE (ML) INJECTION
Status: COMPLETED | OUTPATIENT
Start: 2022-02-15 | End: 2022-02-15

## 2022-02-15 RX ORDER — METOCLOPRAMIDE HYDROCHLORIDE 5 MG/ML
10 INJECTION INTRAMUSCULAR; INTRAVENOUS EVERY 6 HOURS PRN
Status: DISCONTINUED | OUTPATIENT
Start: 2022-02-15 | End: 2022-02-18 | Stop reason: HOSPADM

## 2022-02-15 RX ORDER — HEPARIN SODIUM 5000 [USP'U]/ML
5000 INJECTION, SOLUTION INTRAVENOUS; SUBCUTANEOUS EVERY 8 HOURS SCHEDULED
Status: DISCONTINUED | OUTPATIENT
Start: 2022-02-15 | End: 2022-02-18 | Stop reason: HOSPADM

## 2022-02-15 RX ORDER — PROPOFOL 10 MG/ML
INJECTION, EMULSION INTRAVENOUS AS NEEDED
Status: DISCONTINUED | OUTPATIENT
Start: 2022-02-15 | End: 2022-02-15

## 2022-02-15 RX ORDER — OXYCODONE HYDROCHLORIDE AND ACETAMINOPHEN 5; 325 MG/1; MG/1
2 TABLET ORAL EVERY 4 HOURS PRN
Status: DISCONTINUED | OUTPATIENT
Start: 2022-02-15 | End: 2022-02-18 | Stop reason: HOSPADM

## 2022-02-15 RX ORDER — LIDOCAINE HYDROCHLORIDE 20 MG/ML
INJECTION, SOLUTION EPIDURAL; INFILTRATION; INTRACAUDAL; PERINEURAL AS NEEDED
Status: DISCONTINUED | OUTPATIENT
Start: 2022-02-15 | End: 2022-02-15

## 2022-02-15 RX ORDER — HYDROMORPHONE HCL/PF 1 MG/ML
0.5 SYRINGE (ML) INJECTION
Status: DISCONTINUED | OUTPATIENT
Start: 2022-02-15 | End: 2022-02-15 | Stop reason: HOSPADM

## 2022-02-15 RX ORDER — ONDANSETRON 2 MG/ML
4 INJECTION INTRAMUSCULAR; INTRAVENOUS EVERY 6 HOURS PRN
Status: DISCONTINUED | OUTPATIENT
Start: 2022-02-15 | End: 2022-02-18 | Stop reason: HOSPADM

## 2022-02-15 RX ORDER — OXYCODONE HYDROCHLORIDE AND ACETAMINOPHEN 5; 325 MG/1; MG/1
1 TABLET ORAL EVERY 4 HOURS PRN
Status: DISCONTINUED | OUTPATIENT
Start: 2022-02-15 | End: 2022-02-18 | Stop reason: HOSPADM

## 2022-02-15 RX ORDER — ONDANSETRON 2 MG/ML
INJECTION INTRAMUSCULAR; INTRAVENOUS AS NEEDED
Status: DISCONTINUED | OUTPATIENT
Start: 2022-02-15 | End: 2022-02-15

## 2022-02-15 RX ORDER — OXYBUTYNIN CHLORIDE 5 MG/1
5 TABLET ORAL EVERY 6 HOURS PRN
Status: DISCONTINUED | OUTPATIENT
Start: 2022-02-15 | End: 2022-02-18 | Stop reason: HOSPADM

## 2022-02-15 RX ORDER — MAGNESIUM HYDROXIDE 1200 MG/15ML
LIQUID ORAL AS NEEDED
Status: DISCONTINUED | OUTPATIENT
Start: 2022-02-15 | End: 2022-02-15 | Stop reason: HOSPADM

## 2022-02-15 RX ADMIN — FENTANYL CITRATE 25 MCG: 50 INJECTION, SOLUTION INTRAMUSCULAR; INTRAVENOUS at 14:04

## 2022-02-15 RX ADMIN — SODIUM CHLORIDE, SODIUM LACTATE, POTASSIUM CHLORIDE, AND CALCIUM CHLORIDE 75 ML/HR: .6; .31; .03; .02 INJECTION, SOLUTION INTRAVENOUS at 16:34

## 2022-02-15 RX ADMIN — OXYCODONE HYDROCHLORIDE AND ACETAMINOPHEN 1 TABLET: 5; 325 TABLET ORAL at 21:59

## 2022-02-15 RX ADMIN — OXYCODONE HYDROCHLORIDE AND ACETAMINOPHEN 2 TABLET: 5; 325 TABLET ORAL at 16:31

## 2022-02-15 RX ADMIN — CEFAZOLIN SODIUM 2000 MG: 2 SOLUTION INTRAVENOUS at 12:48

## 2022-02-15 RX ADMIN — FENTANYL CITRATE 25 MCG: 50 INJECTION, SOLUTION INTRAMUSCULAR; INTRAVENOUS at 14:24

## 2022-02-15 RX ADMIN — CEFAZOLIN SODIUM 1000 MG: 1 SOLUTION INTRAVENOUS at 20:29

## 2022-02-15 RX ADMIN — METOCLOPRAMIDE 10 MG: 5 INJECTION, SOLUTION INTRAMUSCULAR; INTRAVENOUS at 18:42

## 2022-02-15 RX ADMIN — ONDANSETRON 4 MG: 2 INJECTION INTRAMUSCULAR; INTRAVENOUS at 16:46

## 2022-02-15 RX ADMIN — SODIUM CHLORIDE, SODIUM LACTATE, POTASSIUM CHLORIDE, AND CALCIUM CHLORIDE 125 ML/HR: .6; .31; .03; .02 INJECTION, SOLUTION INTRAVENOUS at 12:35

## 2022-02-15 RX ADMIN — PROPOFOL 200 MG: 10 INJECTION, EMULSION INTRAVENOUS at 12:51

## 2022-02-15 RX ADMIN — OXYBUTYNIN CHLORIDE 5 MG: 5 TABLET ORAL at 21:59

## 2022-02-15 RX ADMIN — FENTANYL CITRATE 25 MCG: 50 INJECTION, SOLUTION INTRAMUSCULAR; INTRAVENOUS at 14:14

## 2022-02-15 RX ADMIN — SODIUM CHLORIDE, SODIUM LACTATE, POTASSIUM CHLORIDE, AND CALCIUM CHLORIDE: .6; .31; .03; .02 INJECTION, SOLUTION INTRAVENOUS at 13:35

## 2022-02-15 RX ADMIN — LIDOCAINE HYDROCHLORIDE 80 MG: 20 INJECTION, SOLUTION EPIDURAL; INFILTRATION; INTRACAUDAL; PERINEURAL at 12:51

## 2022-02-15 RX ADMIN — FENTANYL CITRATE 50 MCG: 50 INJECTION INTRAMUSCULAR; INTRAVENOUS at 12:59

## 2022-02-15 RX ADMIN — HYDROMORPHONE HYDROCHLORIDE 0.5 MG: 1 INJECTION, SOLUTION INTRAMUSCULAR; INTRAVENOUS; SUBCUTANEOUS at 14:29

## 2022-02-15 RX ADMIN — HEPARIN SODIUM 5000 UNITS: 5000 INJECTION INTRAVENOUS; SUBCUTANEOUS at 16:33

## 2022-02-15 RX ADMIN — ONDANSETRON 4 MG: 2 INJECTION INTRAMUSCULAR; INTRAVENOUS at 13:14

## 2022-02-15 RX ADMIN — FENTANYL CITRATE 50 MCG: 50 INJECTION INTRAMUSCULAR; INTRAVENOUS at 12:51

## 2022-02-15 RX ADMIN — MELATONIN 6 MG: at 23:30

## 2022-02-15 RX ADMIN — OXYBUTYNIN CHLORIDE 5 MG: 5 TABLET ORAL at 15:21

## 2022-02-15 RX ADMIN — FENTANYL CITRATE 25 MCG: 50 INJECTION, SOLUTION INTRAMUSCULAR; INTRAVENOUS at 13:54

## 2022-02-15 RX ADMIN — HYDROMORPHONE HYDROCHLORIDE 0.5 MG: 1 INJECTION, SOLUTION INTRAMUSCULAR; INTRAVENOUS; SUBCUTANEOUS at 14:48

## 2022-02-15 RX ADMIN — HYDROMORPHONE HYDROCHLORIDE 0.5 MG: 1 INJECTION, SOLUTION INTRAMUSCULAR; INTRAVENOUS; SUBCUTANEOUS at 15:14

## 2022-02-15 RX ADMIN — HEPARIN SODIUM 5000 UNITS: 5000 INJECTION INTRAVENOUS; SUBCUTANEOUS at 21:59

## 2022-02-15 NOTE — ANESTHESIA PREPROCEDURE EVALUATION
Procedure:  TURP (N/A Urethra)    Relevant Problems   CARDIO   (+) Hyperlipidemia      /RENAL   (+) BPH with urinary obstruction      HEMATOLOGY   (+) Thrombocytopenia (HCC)      NEURO/PSYCH   (+) Claustrophobia        Physical Exam    Airway    Mallampati score: II  TM Distance: >3 FB  Neck ROM: full     Dental       Cardiovascular  Rhythm: regular, Rate: normal,     Pulmonary  Breath sounds clear to auscultation,     Other Findings        Anesthesia Plan  ASA Score- 3     Anesthesia Type- general with ASA Monitors  Additional Monitors:   Airway Plan:           Plan Factors-Exercise tolerance (METS): >4 METS  Chart reviewed  Existing labs reviewed  Patient summary reviewed  Patient is not a current smoker  Patient not instructed to abstain from smoking on day of procedure  Patient did not smoke on day of surgery  Obstructive sleep apnea risk education given perioperatively  Induction- intravenous  Postoperative Plan-     Informed Consent- Anesthetic plan and risks discussed with patient

## 2022-02-15 NOTE — ANESTHESIA POSTPROCEDURE EVALUATION
Post-Op Assessment Note    CV Status:  Stable    Pain management: adequate     Mental Status:  Awake   Hydration Status:  Stable   PONV Controlled:  None   Airway Patency:  Patent      Post Op Vitals Reviewed: Yes      Staff: Anesthesiologist         No complications documented      BP      Temp      Pulse     Resp      SpO2      /76   Pulse 64   Temp 97 7 °F (36 5 °C) (Temporal)   Resp 12   Ht 5' 11" (1 803 m)   Wt 95 1 kg (209 lb 10 5 oz)   SpO2 93%   BMI 29 24 kg/m²

## 2022-02-15 NOTE — PLAN OF CARE
Problem: PAIN - ADULT  Goal: Verbalizes/displays adequate comfort level or baseline comfort level  Description: Interventions:  - Encourage patient to monitor pain and request assistance  - Assess pain using appropriate pain scale  - Administer analgesics based on type and severity of pain and evaluate response  - Implement non-pharmacological measures as appropriate and evaluate response  - Consider cultural and social influences on pain and pain management  - Notify physician/advanced practitioner if interventions unsuccessful or patient reports new pain  Outcome: Progressing     Problem: INFECTION - ADULT  Goal: Absence or prevention of progression during hospitalization  Description: INTERVENTIONS:  - Assess and monitor for signs and symptoms of infection  - Monitor lab/diagnostic results  - Monitor all insertion sites, i e  indwelling lines, tubes, and drains  - Monitor endotracheal if appropriate and nasal secretions for changes in amount and color  - [place] appropriate cooling/warming therapies per order  - Administer medications as ordered  - Instruct and encourage patient and family to use good hand hygiene technique  - Identify and instruct in appropriate isolation precautions for identified infection/condition  Outcome: Progressing     Problem: SAFETY ADULT  Goal: Patient will remain free of falls  Description: INTERVENTIONS:  - Educate patient/family on patient safety including physical limitations  - Instruct patient to call for assistance with activity   - Consult OT/PT to assist with strengthening/mobility   - Keep Call bell within reach  - Keep bed low and locked with side rails adjusted as appropriate  - Keep care items and personal belongings within reach  - Initiate and maintain comfort rounds  - Make Fall Risk Sign visible to staff  - Apply yellow socks and bracelet for high fall risk patients  - Consider moving patient to room near nurses station  Outcome: Progressing  Goal: Maintain or return to baseline ADL function  Description: INTERVENTIONS:  -  Assess patient's ability to carry out ADLs; assess patient's baseline for ADL function and identify physical deficits which impact ability to perform ADLs (bathing, care of mouth/teeth, toileting, grooming, dressing, etc )  - Assess/evaluate cause of self-care deficits   - Assess range of motion  - Assess patient's mobility; develop plan if impaired  - Assess patient's need for assistive devices and provide as appropriate  - Encourage maximum independence but intervene and supervise when necessary  - Involve family in performance of ADLs  - Assess for home care needs following discharge   - Consider OT consult to assist with ADL evaluation and planning for discharge  - Provide patient education as appropriate  Outcome: Progressing  Goal: Maintains/Returns to pre admission functional level  Description: INTERVENTIONS:  - Perform BMAT or MOVE assessment daily    - Set and communicate daily mobility goal to care team and patient/family/caregiver     - Collaborate with rehabilitation services on mobility goals if consulted  - Out of bed for toileting  - Record patient progress and toleration of activity level   Outcome: Progressing     Problem: DISCHARGE PLANNING  Goal: Discharge to home or other facility with appropriate resources  Description: INTERVENTIONS:  - Identify barriers to discharge w/patient and caregiver  - Arrange for needed discharge resources and transportation as appropriate  - Identify discharge learning needs (meds, wound care, etc )  - Arrange for interpretive services to assist at discharge as needed  - Refer to Case Management Department for coordinating discharge planning if the patient needs post-hospital services based on physician/advanced practitioner order or complex needs related to functional status, cognitive ability, or social support system  Outcome: Progressing     Problem: Knowledge Deficit  Goal: Patient/family/caregiver demonstrates understanding of disease process, treatment plan, medications, and discharge instructions  Description: Complete learning assessment and assess knowledge base    Interventions:  - Provide teaching at level of understanding  - Provide teaching via preferred learning methods  Outcome: Progressing     Problem: GENITOURINARY - ADULT  Goal: Maintains or returns to baseline urinary function  Description: INTERVENTIONS:  - Assess urinary function  - Encourage oral fluids to ensure adequate hydration if ordered  - Administer IV fluids as ordered to ensure adequate hydration  - Administer ordered medications as needed  - Offer frequent toileting  - Follow urinary retention protocol if ordered  Outcome: Progressing  Goal: Urinary catheter remains patent  Description: INTERVENTIONS:  - Assess patency of urinary catheter  - If patient has a chronic taylor, consider changing catheter if non-functioning  - Follow guidelines for intermittent irrigation of non-functioning urinary catheter  Outcome: Progressing

## 2022-02-15 NOTE — OP NOTE
PERATIVE REPORT  PATIENT NAME: Edmond Webb    :  [id number]  MRN: [id number]  Pt Location: [facility] ROOM 06    SURGERY DATE: 2/15/2022    Surgeon(s) and Role:     * Elba Mosehr MD - Primary    Preop Diagnosis:  BPH with urinary obstruction [N40 1, N13 8]    Post-Op Diagnosis Codes:     * BPH with urinary obstruction [N40 1, N13 8]    Procedure(s) (LRB):  TURP (N/A)    Specimen(s):  ID Type Source Tests Collected by Time Destination   1 : Prostate Chips Tissue Prostate TISSUE EXAM Elba Mosher MD 2/15/2022  1:23 PM        Estimated Blood Loss:   Minimal    Drains:  Urethral Catheter Three way 24 Fr  (Active)   Number of days: 0       Continuous Bladder Irrigation Three-way (Active)   Number of days: 0       Anesthesia Type:   General/LMA    Operative Indications:  BPH with urinary obstruction [N40 1, N13 8]      Operative Findings:  Large prostate, obstructive bladder    Complications:   None    Procedure and Technique:      The patient was brought into the OR, properly identified and positioned on the table  General anesthesia was induced, and he was prepped using chlorhexidine solution and draped in lithotomy position  The 32 F resectoscope sheath was introduced, and inspection confirmed hyperplasia of the prostate and secondary obstructive changes in the bladder  Prostate tissue was resected circumferentially from the bladder neck out to the apex, down to intermittent capsular fibers  There were no capsular perforations noted  Pieces of tissue were evacuated using the Urovac bottle type evacuator  Hemostasis was obtained using electrocautery  Final inspection revealed no damage to the ureteral orifices, well resected prostate, no significant bleeding, no retained pieces of tissue, and no damage to the sphincter  The scope was removed, a 25 Fcatheter was inserted, irrigated clear, and hooked up to gravity drainage   The patient left the OR in good condition     I was present for the entire procedure    Patient Disposition:  PACU       SIGNATURE: Elba Mosher MD  DATE: February 15, 2022  TIME: 1:39 PM

## 2022-02-16 ENCOUNTER — TELEPHONE (OUTPATIENT)
Dept: UROLOGY | Facility: HOSPITAL | Age: 77
End: 2022-02-16

## 2022-02-16 PROCEDURE — 99024 POSTOP FOLLOW-UP VISIT: CPT | Performed by: PHYSICIAN ASSISTANT

## 2022-02-16 RX ORDER — DOCUSATE SODIUM 100 MG/1
100 CAPSULE, LIQUID FILLED ORAL 2 TIMES DAILY
Status: DISCONTINUED | OUTPATIENT
Start: 2022-02-16 | End: 2022-02-18 | Stop reason: HOSPADM

## 2022-02-16 RX ORDER — OXYBUTYNIN CHLORIDE 5 MG/1
5 TABLET ORAL ONCE
Status: COMPLETED | OUTPATIENT
Start: 2022-02-16 | End: 2022-02-16

## 2022-02-16 RX ADMIN — OXYCODONE HYDROCHLORIDE AND ACETAMINOPHEN 1 TABLET: 5; 325 TABLET ORAL at 16:09

## 2022-02-16 RX ADMIN — DOCUSATE SODIUM 100 MG: 100 CAPSULE ORAL at 17:09

## 2022-02-16 RX ADMIN — ACETAMINOPHEN 650 MG: 325 TABLET, FILM COATED ORAL at 05:17

## 2022-02-16 RX ADMIN — HEPARIN SODIUM 5000 UNITS: 5000 INJECTION INTRAVENOUS; SUBCUTANEOUS at 05:17

## 2022-02-16 RX ADMIN — HEPARIN SODIUM 5000 UNITS: 5000 INJECTION INTRAVENOUS; SUBCUTANEOUS at 21:09

## 2022-02-16 RX ADMIN — ACETAMINOPHEN 650 MG: 325 TABLET, FILM COATED ORAL at 17:10

## 2022-02-16 RX ADMIN — OXYCODONE HYDROCHLORIDE AND ACETAMINOPHEN 1 TABLET: 5; 325 TABLET ORAL at 21:09

## 2022-02-16 RX ADMIN — ACETAMINOPHEN 650 MG: 325 TABLET, FILM COATED ORAL at 11:20

## 2022-02-16 RX ADMIN — OXYBUTYNIN CHLORIDE 5 MG: 5 TABLET ORAL at 16:10

## 2022-02-16 RX ADMIN — OXYBUTYNIN CHLORIDE 5 MG: 5 TABLET ORAL at 12:23

## 2022-02-16 RX ADMIN — HEPARIN SODIUM 5000 UNITS: 5000 INJECTION INTRAVENOUS; SUBCUTANEOUS at 15:33

## 2022-02-16 RX ADMIN — CEFAZOLIN SODIUM 1000 MG: 1 SOLUTION INTRAVENOUS at 05:17

## 2022-02-16 RX ADMIN — MELATONIN 6 MG: at 21:09

## 2022-02-16 NOTE — PLAN OF CARE
Problem: PAIN - ADULT  Goal: Verbalizes/displays adequate comfort level or baseline comfort level  Description: Interventions:  - Encourage patient to monitor pain and request assistance  - Assess pain using appropriate pain scale  - Administer analgesics based on type and severity of pain and evaluate response  - Implement non-pharmacological measures as appropriate and evaluate response  - Consider cultural and social influences on pain and pain management  - Notify physician/advanced practitioner if interventions unsuccessful or patient reports new pain  Outcome: Progressing     Problem: INFECTION - ADULT  Goal: Absence or prevention of progression during hospitalization  Description: INTERVENTIONS:  - Assess and monitor for signs and symptoms of infection  - Monitor lab/diagnostic results  - Monitor all insertion sites, i e  indwelling lines, tubes, and drains  - Monitor endotracheal if appropriate and nasal secretions for changes in amount and color  - [place] appropriate cooling/warming therapies per order  - Administer medications as ordered  - Instruct and encourage patient and family to use good hand hygiene technique  - Identify and instruct in appropriate isolation precautions for identified infection/condition  Outcome: Progressing     Problem: SAFETY ADULT  Goal: Patient will remain free of falls  Description: INTERVENTIONS:  - Educate patient/family on patient safety including physical limitations  - Instruct patient to call for assistance with activity   - Consult OT/PT to assist with strengthening/mobility   - Keep Call bell within reach  - Keep bed low and locked with side rails adjusted as appropriate  - Keep care items and personal belongings within reach  - Initiate and maintain comfort rounds  - Make Fall Risk Sign visible to staff  - Offer Toileting every 2 Hours, in advance of need  - Initiate/Maintain bed alarm  - Obtain necessary fall risk management equipment  - Apply yellow socks and bracelet for high fall risk patients  - Consider moving patient to room near nurses station  Outcome: Progressing  Goal: Maintain or return to baseline ADL function  Description: INTERVENTIONS:  -  Assess patient's ability to carry out ADLs; assess patient's baseline for ADL function and identify physical deficits which impact ability to perform ADLs (bathing, care of mouth/teeth, toileting, grooming, dressing, etc )  - Assess/evaluate cause of self-care deficits   - Assess range of motion  - Assess patient's mobility; develop plan if impaired  - Assess patient's need for assistive devices and provide as appropriate  - Encourage maximum independence but intervene and supervise when necessary  - Involve family in performance of ADLs  - Assess for home care needs following discharge   - Consider OT consult to assist with ADL evaluation and planning for discharge  - Provide patient education as appropriate  Outcome: Progressing  Goal: Maintains/Returns to pre admission functional level  Description: INTERVENTIONS:  - Perform BMAT or MOVE assessment daily    - Set and communicate daily mobility goal to care team and patient/family/caregiver  - Collaborate with rehabilitation services on mobility goals if consulted  - Perform Range of Motion 3 times a day  - Reposition patient every 2 hours    - Dangle patient 3 times a day  - Stand patient 3 times a day  - Ambulate patient 3 times a day  - Out of bed to chair 3 times a day   - Out of bed for meals 3 times a day  - Out of bed for toileting  - Record patient progress and toleration of activity level   Outcome: Progressing     Problem: DISCHARGE PLANNING  Goal: Discharge to home or other facility with appropriate resources  Description: INTERVENTIONS:  - Identify barriers to discharge w/patient and caregiver  - Arrange for needed discharge resources and transportation as appropriate  - Identify discharge learning needs (meds, wound care, etc )  - Arrange for interpretive services to assist at discharge as needed  - Refer to Case Management Department for coordinating discharge planning if the patient needs post-hospital services based on physician/advanced practitioner order or complex needs related to functional status, cognitive ability, or social support system  Outcome: Progressing     Problem: Knowledge Deficit  Goal: Patient/family/caregiver demonstrates understanding of disease process, treatment plan, medications, and discharge instructions  Description: Complete learning assessment and assess knowledge base    Interventions:  - Provide teaching at level of understanding  - Provide teaching via preferred learning methods  Outcome: Progressing     Problem: GENITOURINARY - ADULT  Goal: Maintains or returns to baseline urinary function  Description: INTERVENTIONS:  - Assess urinary function  - Encourage oral fluids to ensure adequate hydration if ordered  - Administer IV fluids as ordered to ensure adequate hydration  - Administer ordered medications as needed  - Offer frequent toileting  - Follow urinary retention protocol if ordered  Outcome: Progressing  Goal: Urinary catheter remains patent  Description: INTERVENTIONS:  - Assess patency of urinary catheter  - If patient has a chronic taylor, consider changing catheter if non-functioning  - Follow guidelines for intermittent irrigation of non-functioning urinary catheter  Outcome: Progressing     Problem: MOBILITY - ADULT  Goal: Maintain or return to baseline ADL function  Description: INTERVENTIONS:  -  Assess patient's ability to carry out ADLs; assess patient's baseline for ADL function and identify physical deficits which impact ability to perform ADLs (bathing, care of mouth/teeth, toileting, grooming, dressing, etc )  - Assess/evaluate cause of self-care deficits   - Assess range of motion  - Assess patient's mobility; develop plan if impaired  - Assess patient's need for assistive devices and provide as appropriate  - Encourage maximum independence but intervene and supervise when necessary  - Involve family in performance of ADLs  - Assess for home care needs following discharge   - Consider OT consult to assist with ADL evaluation and planning for discharge  - Provide patient education as appropriate  Outcome: Progressing  Goal: Maintains/Returns to pre admission functional level  Description: INTERVENTIONS:  - Perform BMAT or MOVE assessment daily    - Set and communicate daily mobility goal to care team and patient/family/caregiver  - Collaborate with rehabilitation services on mobility goals if consulted  - Perform Range of Motion 3 times a day  - Reposition patient every 2 hours    - Dangle patient 3 times a day  - Stand patient 3 times a day  - Ambulate patient 3 times a day  - Out of bed to chair 3 times a day   - Out of bed for meals 3 times a day  - Out of bed for toileting  - Record patient progress and toleration of activity level   Outcome: Progressing     Problem: Potential for Falls  Goal: Patient will remain free of falls  Description: INTERVENTIONS:  - Educate patient/family on patient safety including physical limitations  - Instruct patient to call for assistance with activity   - Consult OT/PT to assist with strengthening/mobility   - Keep Call bell within reach  - Keep bed low and locked with side rails adjusted as appropriate  - Keep care items and personal belongings within reach  - Initiate and maintain comfort rounds  - Make Fall Risk Sign visible to staff  - Offer Toileting every 2 Hours, in advance of need  - Initiate/Maintain bed alarm  - Obtain necessary fall risk management equipment  - Apply yellow socks and bracelet for high fall risk patients  - Consider moving patient to room near nurses station  Outcome: Progressing

## 2022-02-16 NOTE — ASSESSMENT & PLAN NOTE
· POD#3 TURP  · Patient with additional overnight stay due to cherry colored urine yesterday  Continued on CBI overnight and held at 6 am    Urine maroon with a few small old appearing clots returned on irrigation    Patent and draining   · Hgb stable   · Discussed with Dr Jose Dougherty and patient stable for discharge   · [place] to removed in office next tues or wed

## 2022-02-16 NOTE — QUICK NOTE
Update: ditropan helping with spasms  Currently comfortable  One bag of cbi ran dry maybe that provoked a spasm  Next bag running smoothly, moderate rate  Efflux clear still cherry red  No clots on hand irrigation  Discussed with patient staying additional night for continuous irrigation and observation he agrees  Will re-eval this afternoon and in AM for tentative discharge Thursday morning

## 2022-02-16 NOTE — UTILIZATION REVIEW
Initial Clinical Review    Elective   Op    surgical procedure    OP   surgery  2/15  CHANGED TO IP ADMISSION 2/16  NEEDS  TO REMAIN IN HOUSE FOR CBI,  OBSERVE,     Age/Sex: 78 y o  male     Surgery Date:    2/15/22    Procedure: TURP (N/A)    Anesthesia:    General/LMA    Operative Findings: Large prostate, obstructive bladder    POD#1 Progress Note:   2/16     IP ADMISSION  Villarreal catheter intact,  Hand irrigated overnight for several cots  Urine  Clear,  Cherry with irrigating  On CBI, needs  3-4  Hr  Hand irrigation  Some  Bladder spasms and continue ditropan  2/17   POD  # 2      TURP  Villarreal patent  Today for clear cherry red  Urine  Hand irrigated for decent clot return and clear cherry efflux  Repeat hand irrigation  This pm  For 1 small clot and clearing to pale pink  Plan  Hand irrigation  In next 2 hours  If no further clots, can be d/c  If  Additional clots, need to restart  CBI and hand irrigate  Q 4 hrs  May need  Possible  Surgical intervention  Has  Not been OOB much since surgery      Admission Orders: Date/Time/Statement:   Admission Orders (From admission, onward)     Ordered        02/16/22 1431  Inpatient Admission  Once                      Orders Placed This Encounter   Procedures    Inpatient Admission     Standing Status:   Standing     Number of Occurrences:   1     Order Specific Question:   Level of Care     Answer:   Med Surg [16]     Order Specific Question:   Estimated length of stay     Answer:   Inpatient Only Surgery     Vital Signs: /65 (BP Location: Right arm)   Pulse 58   Temp (!) 96 °F (35 6 °C) (Tympanic)   Resp 18   Ht 5' 11" (1 803 m)   Wt 98 4 kg (216 lb 14 9 oz)   SpO2 94%   BMI 30 26 kg/m²     Pertinent Labs/Diagnostic Test Results:   Results from last 7 days   Lab Units 02/15/22  1058   SARS-COV-2  Negative                           Results from last 7 days   Lab Units 02/15/22  1058   INFLUENZA A PCR  Negative   INFLUENZA B PCR  Negative   RSV PCR  Negative         Diet:    Surgical soft    Mobility:     OOB as tolerated    DVT Prophylaxis:    SCD'S    Medications/Pain Control:   Scheduled Medications:  acetaminophen, 650 mg, Oral, Q6H ANANDA  heparin (porcine), 5,000 Units, Subcutaneous, Q8H [facility]  melatonin, 6 mg, Oral, HS      Continuous IV Infusions:  lactated ringers, 125 mL/hr, Intravenous, Continuous  lactated ringers, 75 mL/hr, Intravenous, Continuous      PRN Meds:  fluticasone, 2 spray, Nasal, Daily PRN  metoclopramide, 10 mg, Intravenous, Q6H PRN  ondansetron, 4 mg, Intravenous, Q6H PRN  oxybutynin, 5 mg, Oral, Q6H PRN  oxyCODONE-acetaminophen, 1 tablet, Oral, Q4H PRN  oxyCODONE-acetaminophen, 2 tablet, Oral, Q4H PRN        Network Utilization Review Department  ATTENTION: Please call with any questions or concerns to [phone] and carefully listen to the prompts so that you are directed to the right person  All voicemails are confidential   Luiz Delude all requests for admission clinical reviews, approved or denied determinations and any other requests to dedicated fax number below belonging to the campus where the patient is receiving treatment   List of dedicated fax numbers for the Facilities:  [address] DENIALS (Administrative/Medical Necessity) [phone]   [address] (Maternity/NICU/Pediatrics) [phone]   [address]  [phone] [address] [place] [address] [place] [address] [place] [address] León [address] [place] [phone] [facility] [address] [place] [address] [phone]

## 2022-02-16 NOTE — PLAN OF CARE
Problem: PAIN - ADULT  Goal: Verbalizes/displays adequate comfort level or baseline comfort level  Description: Interventions:  - Encourage patient to monitor pain and request assistance  - Assess pain using appropriate pain scale  - Administer analgesics based on type and severity of pain and evaluate response  - Implement non-pharmacological measures as appropriate and evaluate response  - Consider cultural and social influences on pain and pain management  - Notify physician/advanced practitioner if interventions unsuccessful or patient reports new pain  Outcome: Progressing     Problem: INFECTION - ADULT  Goal: Absence or prevention of progression during hospitalization  Description: INTERVENTIONS:  - Assess and monitor for signs and symptoms of infection  - Monitor lab/diagnostic results  - Monitor all insertion sites, i e  indwelling lines, tubes, and drains  - Monitor endotracheal if appropriate and nasal secretions for changes in amount and color  - [place] appropriate cooling/warming therapies per order  - Administer medications as ordered  - Instruct and encourage patient and family to use good hand hygiene technique  - Identify and instruct in appropriate isolation precautions for identified infection/condition  Outcome: Progressing     Problem: SAFETY ADULT  Goal: Patient will remain free of falls  Description: INTERVENTIONS:  - Educate patient/family on patient safety including physical limitations  - Instruct patient to call for assistance with activity   - Consult OT/PT to assist with strengthening/mobility   - Keep Call bell within reach  - Keep bed low and locked with side rails adjusted as appropriate  - Keep care items and personal belongings within reach  - Initiate and maintain comfort rounds  - Make Fall Risk Sign visible to staff  - Offer Toileting every 2 Hours, in advance of need  - Initiate/Maintain bedalarm  - Obtain necessary fall risk management equipment: at bedside  - Apply yellow socks and bracelet for high fall risk patients  - Consider moving patient to room near nurses station  Outcome: Progressing  Goal: Maintain or return to baseline ADL function  Description: INTERVENTIONS:  -  Assess patient's ability to carry out ADLs; assess patient's baseline for ADL function and identify physical deficits which impact ability to perform ADLs (bathing, care of mouth/teeth, toileting, grooming, dressing, etc )  - Assess/evaluate cause of self-care deficits   - Assess range of motion  - Assess patient's mobility; develop plan if impaired  - Assess patient's need for assistive devices and provide as appropriate  - Encourage maximum independence but intervene and supervise when necessary  - Involve family in performance of ADLs  - Assess for home care needs following discharge   - Consider OT consult to assist with ADL evaluation and planning for discharge  - Provide patient education as appropriate  Outcome: Progressing  Goal: Maintains/Returns to pre admission functional level  Description: INTERVENTIONS:  - Perform BMAT or MOVE assessment daily    - Set and communicate daily mobility goal to care team and patient/family/caregiver  - Collaborate with rehabilitation services on mobility goals if consulted  - Perform Range of Motion 2 times a day  - Reposition patient every 2 hours    - Dangle patient 2 times a day  - Stand patient 2 times a day  - Ambulate patient 2 times a day  - Out of bed to chair 2 times a day   - Out of bed for meals 2 times a day  - Out of bed for toileting  - Record patient progress and toleration of activity level   Outcome: Progressing     Problem: DISCHARGE PLANNING  Goal: Discharge to home or other facility with appropriate resources  Description: INTERVENTIONS:  - Identify barriers to discharge w/patient and caregiver  - Arrange for needed discharge resources and transportation as appropriate  - Identify discharge learning needs (meds, wound care, etc )  - Arrange for interpretive services to assist at discharge as needed  - Refer to Case Management Department for coordinating discharge planning if the patient needs post-hospital services based on physician/advanced practitioner order or complex needs related to functional status, cognitive ability, or social support system  Outcome: Progressing     Problem: GENITOURINARY - ADULT  Goal: Maintains or returns to baseline urinary function  Description: INTERVENTIONS:  - Assess urinary function  - Encourage oral fluids to ensure adequate hydration if ordered  - Administer IV fluids as ordered to ensure adequate hydration  - Administer ordered medications as needed  - Offer frequent toileting  - Follow urinary retention protocol if ordered  Outcome: Progressing  Goal: Urinary catheter remains patent  Description: INTERVENTIONS:  - Assess patency of urinary catheter  - If patient has a chronic taylor, consider changing catheter if non-functioning  - Follow guidelines for intermittent irrigation of non-functioning urinary catheter  Outcome: Progressing     Problem: MOBILITY - ADULT  Goal: Maintain or return to baseline ADL function  Description: INTERVENTIONS:  -  Assess patient's ability to carry out ADLs; assess patient's baseline for ADL function and identify physical deficits which impact ability to perform ADLs (bathing, care of mouth/teeth, toileting, grooming, dressing, etc )  - Assess/evaluate cause of self-care deficits   - Assess range of motion  - Assess patient's mobility; develop plan if impaired  - Assess patient's need for assistive devices and provide as appropriate  - Encourage maximum independence but intervene and supervise when necessary  - Involve family in performance of ADLs  - Assess for home care needs following discharge   - Consider OT consult to assist with ADL evaluation and planning for discharge  - Provide patient education as appropriate  Outcome: Progressing  Goal: Maintains/Returns to pre admission functional level  Description: INTERVENTIONS:  - Perform BMAT or MOVE assessment daily    - Set and communicate daily mobility goal to care team and patient/family/caregiver  - Collaborate with rehabilitation services on mobility goals if consulted  - Perform Range of Motion 2 times a day  - Reposition patient every 2 hours    - Dangle patient 2 times a day  - Stand patient 2 times a day  - Ambulate patient 2 times a day  - Out of bed to chair 2 times a day   - Out of bed for meals 2 times a day  - Out of bed for toileting  - Record patient progress and toleration of activity level   Outcome: Progressing     Problem: Potential for Falls  Goal: Patient will remain free of falls  Description: INTERVENTIONS:  - Educate patient/family on patient safety including physical limitations  - Instruct patient to call for assistance with activity   - Consult OT/PT to assist with strengthening/mobility   - Keep Call bell within reach  - Keep bed low and locked with side rails adjusted as appropriate  - Keep care items and personal belongings within reach  - Initiate and maintain comfort rounds  - Make Fall Risk Sign visible to staff  - Offer Toileting every 2 Hours, in advance of need  - Initiate/Maintain bedalarm  - Obtain necessary fall risk management equipment: at bedside  - Apply yellow socks and bracelet for high fall risk patients  - Consider moving patient to room near nurses station  Outcome: Progressing

## 2022-02-16 NOTE — PROGRESS NOTES
Progress Note - Urology  Salomon Pereira 1945, 78 y o  male MRN: [id number]    Unit/Bed#: E2 -01 Encounter: [id number]      * BPH with urinary obstruction  Assessment & Plan  POD#1 TURP  CBI moderate with q3-4h hand irrigations, some clots overnight, currently feels clear on my irrigations and tolerates well  Urine/efflux clear but cherry  Ditropan prn for spasms- did not try yet he will try  Otherwise feels well    Re-evaluate throughout the morning/afternoon  Tentative discharge vs observation one more night for irrigation      Subjective: feels OK tolerating catheter well  Some spasms  Overnight had irrigation of several clots  Currently feels well  Ate breakfast     Review of Systems   Constitutional: Negative for activity change, appetite change, chills, fever and unexpected weight change  HENT: Negative  Respiratory: Negative  Negative for shortness of breath  Cardiovascular: Negative  Negative for chest pain  Gastrointestinal: Negative for abdominal pain, diarrhea, nausea and vomiting  Endocrine: Negative  Genitourinary: Positive for hematuria  Negative for decreased urine volume, difficulty urinating, dysuria, flank pain, frequency, scrotal swelling, testicular pain and urgency  Musculoskeletal: Negative for back pain and gait problem  Skin: Negative  Allergic/Immunologic: Negative  Neurological: Negative  Hematological: Negative for adenopathy  Does not bruise/bleed easily  Objective:  Vitals: Blood pressure 113/65, pulse 58, temperature (!) 96 °F (35 6 °C), temperature source Tympanic, resp  rate 18, height 5' 11" (1 803 m), weight 98 4 kg (216 lb 14 9 oz), SpO2 94 %  ,Body mass index is 30 26 kg/m²      Intake/Output Summary (Last 24 hours) at 2/16/2022 1343  Last data filed at 2/16/2022 1328  Gross per 24 hour   Intake 650 ml   Output 18611 ml   Net -79726 ml     Invasive Devices  Report    Peripheral Intravenous Line            Peripheral IV 02/15/22 Dorsal (posterior); Left Hand 1 day          Drain            Continuous Bladder Irrigation Three-way 1 day    Urethral Catheter Three way 24 Fr  1 day                Physical Exam  Vitals and nursing note reviewed  Constitutional:       Appearance: He is well-developed  HENT:      Head: Normocephalic and atraumatic  Cardiovascular:      Rate and Rhythm: Normal rate and regular rhythm  Heart sounds: Normal heart sounds  No murmur heard  Pulmonary:      Effort: Pulmonary effort is normal       Breath sounds: Normal breath sounds  Abdominal:      General: Bowel sounds are normal       Palpations: Abdomen is soft  Genitourinary:     Comments: Circumcised penis, normal phallus, orthotopic patent meatus  Testes smooth descended bilaterally into the scrotum nontender with no palpable mass  Villarreal catheter patent with clear pink/orange then later cherry efflux on moderate CBI  Musculoskeletal:         General: Normal range of motion  Skin:     General: Skin is warm and dry  Capillary Refill: Capillary refill takes less than 2 seconds  Coloration: Skin is not pale  Neurological:      Mental Status: He is alert and oriented to person, place, and time  Labs:  No results for input(s): WBC in the last 72 hours  No results for input(s): HGB in the last 72 hours  No results for input(s): CREATININE in the last 72 hours      History:    Past Medical History:   Diagnosis Date    Allergic rhinitis due to pollen     last assessed: May 31, 2016    Atypical pigmented skin lesion     last assessed: May 11, 2015    Enlarged prostate     GERD (gastroesophageal reflux disease)     Hemorrhoids     last assessed: May 19, 2014    Hyperlipidemia     Malignant neoplasm of skin     BCCA    Persistent dry cough     last assessed/resolved: July 21, 2015    PONV (postoperative nausea and vomiting)     Wears hearing aid     resolved: June 19, 2017     Past Surgical History:   Procedure Laterality Date    BACK SURGERY      CARPAL TUNNEL RELEASE Bilateral     CATARACT EXTRACTION Bilateral     COLONOSCOPY      HEMORRHOID SURGERY      NEUROPLASTY / TRANSPOSITION MEDIAN NERVE AT CARPAL TUNNEL      OTHER SURGICAL HISTORY      Basal Cell    TX LAMNOTMY INCL W/DCMPRSN NRV ROOT 1 INTRSPC LUMBR N/A 2017    Procedure: L4/5 MINIMALLY INVASIVE BILATERAL LAMINOTOMY FROM A LEFT SIDED APPROACH AND MICRODISCECTOMY WITH POSSIBLE EPIDURAL STEROID INJECTION;  Surgeon: Ren Bush MD;  [facility];  Service: Neurosurgery    TX TRANSURETHRAL ELEC-SURG PROSTATECTOM N/A 2/15/2022    Procedure: TURP;  Surgeon: Miracle James MD;  [facility];  Service: Urology    TONSILLECTOMY       Family History   Problem Relation Age of Onset    Anemia Mother     Parkinsonism Father     Anemia Family     Heart disease Family     Other Family         current diet is high in cholesterol     Alcohol abuse Neg Hx     Substance Abuse Neg Hx     Mental illness Neg Hx     Depression Neg Hx      Social History     Socioeconomic History    Marital status: /Civil Union     Spouse name: None    Number of children: 1    Years of education: completed graduate school, Master's Degree     Highest education level: None   Occupational History    Occupation: Retired    Tobacco Use    Smoking status: Former Smoker     Quit date:      Years since quittin 1    Smokeless tobacco: Never Used   Vaping Use    Vaping Use: Never used   Substance and Sexual Activity    Alcohol use:  Yes     Alcohol/week: 7 0 standard drinks     Types: 7 Shots of liquor per week     Comment: before dinner - Social     Drug use: No    Sexual activity: Yes     Partners: Female   Other Topics Concern    None   Social History Narrative    CONSUMES 1 CUP OF COFFEE PER DAY            Lives with spouse      Social Determinants of Health     Financial Resource Strain: Not on file   Food Insecurity: Not on file   Transportation Needs: Not on file   Physical Activity: Not on file   Stress: Not on file   Social Connections: Not on file   Intimate Partner Violence: Not on file   Housing Stability: Not on file         Srinivas Santizo  Date: 2/16/2022 Time: 1:43 PM

## 2022-02-17 LAB
HCT VFR BLD AUTO: 33.5 % (ref 36.5–49.3)
HGB BLD-MCNC: 11.2 G/DL (ref 12–17)
PLATELET # BLD AUTO: 127 THOUSANDS/UL (ref 149–390)
PMV BLD AUTO: 10.5 FL (ref 8.9–12.7)

## 2022-02-17 PROCEDURE — 85014 HEMATOCRIT: CPT | Performed by: PHYSICIAN ASSISTANT

## 2022-02-17 PROCEDURE — 99024 POSTOP FOLLOW-UP VISIT: CPT | Performed by: PHYSICIAN ASSISTANT

## 2022-02-17 PROCEDURE — 85049 AUTOMATED PLATELET COUNT: CPT | Performed by: UROLOGY

## 2022-02-17 PROCEDURE — 85018 HEMOGLOBIN: CPT | Performed by: PHYSICIAN ASSISTANT

## 2022-02-17 RX ADMIN — DOCUSATE SODIUM 100 MG: 100 CAPSULE ORAL at 08:36

## 2022-02-17 RX ADMIN — MELATONIN 6 MG: at 21:59

## 2022-02-17 RX ADMIN — HEPARIN SODIUM 5000 UNITS: 5000 INJECTION INTRAVENOUS; SUBCUTANEOUS at 21:59

## 2022-02-17 RX ADMIN — OXYBUTYNIN CHLORIDE 5 MG: 5 TABLET ORAL at 15:47

## 2022-02-17 RX ADMIN — ACETAMINOPHEN 650 MG: 325 TABLET, FILM COATED ORAL at 05:33

## 2022-02-17 RX ADMIN — DOCUSATE SODIUM 100 MG: 100 CAPSULE ORAL at 17:00

## 2022-02-17 RX ADMIN — ACETAMINOPHEN 650 MG: 325 TABLET, FILM COATED ORAL at 23:10

## 2022-02-17 RX ADMIN — ACETAMINOPHEN 650 MG: 325 TABLET, FILM COATED ORAL at 13:55

## 2022-02-17 RX ADMIN — HEPARIN SODIUM 5000 UNITS: 5000 INJECTION INTRAVENOUS; SUBCUTANEOUS at 13:56

## 2022-02-17 RX ADMIN — SODIUM CHLORIDE, SODIUM LACTATE, POTASSIUM CHLORIDE, AND CALCIUM CHLORIDE 25 ML/HR: .6; .31; .03; .02 INJECTION, SOLUTION INTRAVENOUS at 15:47

## 2022-02-17 RX ADMIN — ACETAMINOPHEN 650 MG: 325 TABLET, FILM COATED ORAL at 17:00

## 2022-02-17 RX ADMIN — OXYBUTYNIN CHLORIDE 5 MG: 5 TABLET ORAL at 22:03

## 2022-02-17 RX ADMIN — HEPARIN SODIUM 5000 UNITS: 5000 INJECTION INTRAVENOUS; SUBCUTANEOUS at 05:34

## 2022-02-17 NOTE — QUICK NOTE
Repeat manual irrigation with 120 cc NSS at bedside  Prior to irrigation, taylor catheter again with clear cherry red urine  Clears to light pink on irrigation  No clots  Given continued cherry red urine without irrigation, will keep an additional night on slow/moderate CBI with manual irrigation Q4  Will place orders to clamp CBI at 0600 tomorrow morning  NPO at midnight for re-evaluation tomorrow morning  Reviewed plan with patient and his wife who verbalize understanding and are agreeable to plan       Neeta Cruz PA-C

## 2022-02-17 NOTE — PROGRESS NOTES
Progress Note - Urology  Sydnee Josue 1945, 78 y o  male MRN: [id number]    Unit/Bed#: E2 -01 Encounter: [id number]    * BPH with urinary obstruction  Assessment & Plan  · POD#2 TURP  · Taylor catheter this morning was patent with clear cherry red output  · Hand irrigation of 275 cc NSS preformed by myself at bedside this morning with decent clot return and clear cherry efflux   · Repeat hand irrigation this afternoon of 275 cc of NSS with only one small clot returned, efflux clearing to pale pink  · Plan: Will repeat hand irrigation in 2 hours  · If no clot return and urine continues to clear to pale pink, will discharge to home with taylor catheter in place  Patient will having taylor catheter removed in the office early next week  · If there is clot return or clear cherry efflux with irrigation, will restart CBI WITH manual irrigation Q4  NPO at midnight for re-evaluation tomorrow morning and possible cysto clot evac/fulguration with Dr Raúl Arnold in the [address]  Subjective:   HPI: Patient feeling well today  States he has not had any bladder spasms since 5-6pm last night  He has been tolerating oral diet  Denies bowel movements but confirms flatus  He has not been OOB and ambulating much following his procedure  Review of Systems   Constitutional: Negative for appetite change, chills and fever  HENT: Negative  Respiratory: Negative  Cardiovascular: Negative  Gastrointestinal: Negative for abdominal pain, nausea and vomiting  Genitourinary: Positive for hematuria  Negative for flank pain  No bladder spasms today  Musculoskeletal: Negative  Skin: Negative  Neurological: Negative  Objective:  Nursing Rounds: Plan communicated with Tonny Bautista: Blood pressure 117/65, pulse 62, temperature (!) 96 °F (35 6 °C), temperature source Tympanic, resp  rate 16, height 5' 11" (1 803 m), weight 98 4 kg (216 lb 14 9 oz), SpO2 92 %  ,Body mass index is 30 26 kg/m²  Physical Exam  Vitals reviewed  Constitutional:       General: He is not in acute distress  Appearance: Normal appearance  He is obese  He is not ill-appearing, toxic-appearing or diaphoretic  HENT:      Head: Normocephalic and atraumatic  Eyes:      Conjunctiva/sclera: Conjunctivae normal    Cardiovascular:      Rate and Rhythm: Normal rate and regular rhythm  Heart sounds: Normal heart sounds  Pulmonary:      Effort: Pulmonary effort is normal  No respiratory distress  Breath sounds: Normal breath sounds  Abdominal:      General: Bowel sounds are normal  There is no distension  Palpations: Abdomen is soft  Tenderness: There is no abdominal tenderness  There is no right CVA tenderness, left CVA tenderness, guarding or rebound  Genitourinary:     Comments: Villarreal catheter patent with clear cherry red urine off CBI today  Musculoskeletal:         General: Normal range of motion  Cervical back: Normal range of motion  Skin:     General: Skin is warm and dry  Neurological:      General: No focal deficit present  Mental Status: He is alert and oriented to person, place, and time  Psychiatric:         Mood and Affect: Mood normal          Behavior: Behavior normal          Thought Content: Thought content normal          Judgment: Judgment normal          Imaging:  None today  Labs:  No results for input(s): WBC in the last 72 hours  Recent Labs     02/17/22  1019   HGB 11 2*     Recent Labs     02/17/22  1019   HCT 33 5*     No results for input(s): CREATININE in the last 72 hours      Preop Urine Culture No Growth <1000 cfu/mL                  Specimen Collected: 02/07/22 09:36 Last Resulted: 02/08/22 08:43           History:    Past Medical History:   Diagnosis Date    Allergic rhinitis due to pollen     last assessed: May 31, 2016    Atypical pigmented skin lesion     last assessed: May 11, 2015    Enlarged prostate     GERD (gastroesophageal reflux disease)     Hemorrhoids     last assessed: May 19, 2014    Hyperlipidemia     Malignant neoplasm of skin     BCCA    Persistent dry cough     last assessed/resolved: 2015    PONV (postoperative nausea and vomiting)     Wears hearing aid     resolved: 2017     Social History     Socioeconomic History    Marital status: /Civil Union     Spouse name: None    Number of children: 1    Years of education: completed graduate school, Master's Degree     Highest education level: None   Occupational History    Occupation: Retired    Tobacco Use    Smoking status: Former Smoker     Quit date: 200     Years since quittin 1    Smokeless tobacco: Never Used   Vaping Use    Vaping Use: Never used   Substance and Sexual Activity    Alcohol use:  Yes     Alcohol/week: 7 0 standard drinks     Types: 7 Shots of liquor per week     Comment: before dinner - Social     Drug use: No    Sexual activity: Yes     Partners: Female   Other Topics Concern    None   Social History Narrative    CONSUMES 1 CUP OF COFFEE PER DAY            Lives with spouse      Social Determinants of Health     Financial Resource Strain: Not on file   Food Insecurity: Not on file   Transportation Needs: Not on file   Physical Activity: Not on file   Stress: Not on file   Social Connections: Not on file   Intimate Partner Violence: Not on file   Housing Stability: Not on file     Past Surgical History:   Procedure Laterality Date    BACK SURGERY      CARPAL TUNNEL RELEASE Bilateral     CATARACT EXTRACTION Bilateral     COLONOSCOPY      HEMORRHOID SURGERY      NEUROPLASTY / [address] [place] [address] INCL W/DCMPRSN NRV ROOT 1 INTRSPC LUMBR N/A 2017    Procedure: L4/5 MINIMALLY INVASIVE BILATERAL LAMINOTOMY FROM A LEFT SIDED APPROACH AND MICRODISCECTOMY WITH POSSIBLE EPIDURAL STEROID INJECTION;  Surgeon: Tiffany Claros MD; [facility];  Service: Neurosurgery    KS TRANSURETHRAL ELEC-SURG PROSTATECTOM N/A 2/15/2022    Procedure: TURP;  Surgeon: Braulio Pruett MD;  [facility];  Service: Urology    TONSILLECTOMY       Family History   Problem Relation Age of Onset    Anemia Mother     Parkinsonism Father     Anemia Family     Heart disease Family     Other Family         current diet is high in cholesterol     Alcohol abuse Neg Hx     Substance Abuse Neg Hx     Mental illness Neg Hx     Depression Neg Hx        Andrew Bennett [place]  Date: 2/17/2022 Time: 1:48 PM

## 2022-02-17 NOTE — PLAN OF CARE
Problem: PAIN - ADULT  Goal: Verbalizes/displays adequate comfort level or baseline comfort level  Description: Interventions:  - Encourage patient to monitor pain and request assistance  - Assess pain using appropriate pain scale  - Administer analgesics based on type and severity of pain and evaluate response  - Implement non-pharmacological measures as appropriate and evaluate response  - Consider cultural and social influences on pain and pain management  - Notify physician/advanced practitioner if interventions unsuccessful or patient reports new pain  2/16/2022 1939 by Orly Fernandes RN  Outcome: Progressing  2/16/2022 1939 by Orly Fernandes RN  Outcome: Progressing     Problem: INFECTION - ADULT  Goal: Absence or prevention of progression during hospitalization  Description: INTERVENTIONS:  - Assess and monitor for signs and symptoms of infection  - Monitor lab/diagnostic results  - Monitor all insertion sites, i e  indwelling lines, tubes, and drains  - Monitor endotracheal if appropriate and nasal secretions for changes in amount and color  - [place] appropriate cooling/warming therapies per order  - Administer medications as ordered  - Instruct and encourage patient and family to use good hand hygiene technique  - Identify and instruct in appropriate isolation precautions for identified infection/condition  2/16/2022 1939 by Orly Fernandes RN  Outcome: Progressing  2/16/2022 1939 by Orly Fernandes RN  Outcome: Progressing     Problem: SAFETY ADULT  Goal: Patient will remain free of falls  Description: INTERVENTIONS:  - Educate patient/family on patient safety including physical limitations  - Instruct patient to call for assistance with activity   - Consult OT/PT to assist with strengthening/mobility   - Keep Call bell within reach  - Keep bed low and locked with side rails adjusted as appropriate  - Keep care items and personal belongings within reach  - Initiate and maintain comfort rounds  - Make Fall Risk Sign visible to staff  - Offer Toileting every 2 Hours, in advance of need  - Initiate/Maintain bed alarm  - Obtain necessary fall risk management equipment  - Apply yellow socks and bracelet for high fall risk patients  - Consider moving patient to room near nurses station  2/16/2022 1939 by Deanna Regan RN  Outcome: Progressing  2/16/2022 1939 by Deanna Regan RN  Outcome: Progressing  Goal: Maintain or return to baseline ADL function  Description: INTERVENTIONS:  -  Assess patient's ability to carry out ADLs; assess patient's baseline for ADL function and identify physical deficits which impact ability to perform ADLs (bathing, care of mouth/teeth, toileting, grooming, dressing, etc )  - Assess/evaluate cause of self-care deficits   - Assess range of motion  - Assess patient's mobility; develop plan if impaired  - Assess patient's need for assistive devices and provide as appropriate  - Encourage maximum independence but intervene and supervise when necessary  - Involve family in performance of ADLs  - Assess for home care needs following discharge   - Consider OT consult to assist with ADL evaluation and planning for discharge  - Provide patient education as appropriate  2/16/2022 1939 by Deanna Regan RN  Outcome: Progressing  2/16/2022 1939 by Deanna Regan RN  Outcome: Progressing  Goal: Maintains/Returns to pre admission functional level  Description: INTERVENTIONS:  - Perform BMAT or MOVE assessment daily    - Set and communicate daily mobility goal to care team and patient/family/caregiver  - Collaborate with rehabilitation services on mobility goals if consulted  - Perform Range of Motion 3 times a day  - Reposition patient every 2 hours    - Dangle patient 3 times a day  - Stand patient 3 times a day  - Ambulate patient 3 times a day  - Out of bed to chair 3 times a day   - Out of bed for meals 3 times a day  - Out of bed for toileting  - Record patient progress and toleration of activity level   2/16/2022 1939 by Tyra Mcdermott RN  Outcome: Progressing  2/16/2022 1939 by Tyra Mcdermott RN  Outcome: Progressing     Problem: DISCHARGE PLANNING  Goal: Discharge to home or other facility with appropriate resources  Description: INTERVENTIONS:  - Identify barriers to discharge w/patient and caregiver  - Arrange for needed discharge resources and transportation as appropriate  - Identify discharge learning needs (meds, wound care, etc )  - Arrange for interpretive services to assist at discharge as needed  - Refer to Case Management Department for coordinating discharge planning if the patient needs post-hospital services based on physician/advanced practitioner order or complex needs related to functional status, cognitive ability, or social support system  2/16/2022 1939 by Tyra Mcdermott RN  Outcome: Progressing  2/16/2022 1939 by Tyra Mcdermott RN  Outcome: Progressing     Problem: Knowledge Deficit  Goal: Patient/family/caregiver demonstrates understanding of disease process, treatment plan, medications, and discharge instructions  Description: Complete learning assessment and assess knowledge base    Interventions:  - Provide teaching at level of understanding  - Provide teaching via preferred learning methods  2/16/2022 1939 by Tyra Mcdermott RN  Outcome: Progressing  2/16/2022 1939 by Tyra Mcdermott RN  Outcome: Progressing     Problem: GENITOURINARY - ADULT  Goal: Maintains or returns to baseline urinary function  Description: INTERVENTIONS:  - Assess urinary function  - Encourage oral fluids to ensure adequate hydration if ordered  - Administer IV fluids as ordered to ensure adequate hydration  - Administer ordered medications as needed  - Offer frequent toileting  - Follow urinary retention protocol if ordered  2/16/2022 1939 by Tyra Mcdermott RN  Outcome: Progressing  2/16/2022 1939 by Tyra Mcdermott RN  Outcome: Progressing  Goal: Urinary catheter remains patent  Description: INTERVENTIONS:  - Assess patency of urinary catheter  - If patient has a chronic taylor, consider changing catheter if non-functioning  - Follow guidelines for intermittent irrigation of non-functioning urinary catheter  2/16/2022 1939 by Joel Mueller RN  Outcome: Progressing  2/16/2022 1939 by Joel Mueller RN  Outcome: Progressing     Problem: MOBILITY - ADULT  Goal: Maintain or return to baseline ADL function  Description: INTERVENTIONS:  -  Assess patient's ability to carry out ADLs; assess patient's baseline for ADL function and identify physical deficits which impact ability to perform ADLs (bathing, care of mouth/teeth, toileting, grooming, dressing, etc )  - Assess/evaluate cause of self-care deficits   - Assess range of motion  - Assess patient's mobility; develop plan if impaired  - Assess patient's need for assistive devices and provide as appropriate  - Encourage maximum independence but intervene and supervise when necessary  - Involve family in performance of ADLs  - Assess for home care needs following discharge   - Consider OT consult to assist with ADL evaluation and planning for discharge  - Provide patient education as appropriate  2/16/2022 1939 by Joel Mueller RN  Outcome: Progressing  2/16/2022 1939 by Joel Mueller RN  Outcome: Progressing  Goal: Maintains/Returns to pre admission functional level  Description: INTERVENTIONS:  - Perform BMAT or MOVE assessment daily    - Set and communicate daily mobility goal to care team and patient/family/caregiver  - Collaborate with rehabilitation services on mobility goals if consulted  - Perform Range of Motion 3 times a day  - Reposition patient every 23 hours    - Dangle patient 3 times a day  - Stand patient 3 times a day  - Ambulate patient 3 times a day  - Out of bed to chair 3 times a day   - Out of bed for meals 3 times a day  - Out of bed for toileting  - Record patient progress and toleration of activity level   2/16/2022 1939 by Lisbet Kirby Chet Villavicencio RN  Outcome: Progressing  2/16/2022 1939 by Monroe Brunner, RN  Outcome: Progressing     Problem: Potential for Falls  Goal: Patient will remain free of falls  Description: INTERVENTIONS:  - Educate patient/family on patient safety including physical limitations  - Instruct patient to call for assistance with activity   - Consult OT/PT to assist with strengthening/mobility   - Keep Call bell within reach  - Keep bed low and locked with side rails adjusted as appropriate  - Keep care items and personal belongings within reach  - Initiate and maintain comfort rounds  - Make Fall Risk Sign visible to staff  - Offer Toileting every 2 Hours, in advance of need  - Initiate/Maintain bed alarm  - Obtain necessary fall risk management equipment  - Apply yellow socks and bracelet for high fall risk patients  - Consider moving patient to room near nurses station  2/16/2022 1939 by Monroe Brunner, RN  Outcome: Progressing  2/16/2022 1939 by Monroe Brunner, RN  Outcome: Progressing

## 2022-02-17 NOTE — PLAN OF CARE
Problem: PAIN - ADULT  Goal: Verbalizes/displays adequate comfort level or baseline comfort level  Description: Interventions:  - Encourage patient to monitor pain and request assistance  - Assess pain using appropriate pain scale  - Administer analgesics based on type and severity of pain and evaluate response  - Implement non-pharmacological measures as appropriate and evaluate response  - Consider cultural and social influences on pain and pain management  - Notify physician/advanced practitioner if interventions unsuccessful or patient reports new pain  Outcome: Progressing     Problem: INFECTION - ADULT  Goal: Absence or prevention of progression during hospitalization  Description: INTERVENTIONS:  - Assess and monitor for signs and symptoms of infection  - Monitor lab/diagnostic results  - Monitor all insertion sites, i e  indwelling lines, tubes, and drains  - Monitor endotracheal if appropriate and nasal secretions for changes in amount and color  - [place] appropriate cooling/warming therapies per order  - Administer medications as ordered  - Instruct and encourage patient and family to use good hand hygiene technique  - Identify and instruct in appropriate isolation precautions for identified infection/condition  Outcome: Progressing     Problem: SAFETY ADULT  Goal: Patient will remain free of falls  Description: INTERVENTIONS:  - Educate patient/family on patient safety including physical limitations  - Instruct patient to call for assistance with activity   - Consult OT/PT to assist with strengthening/mobility   - Keep Call bell within reach  - Keep bed low and locked with side rails adjusted as appropriate  - Keep care items and personal belongings within reach  - Initiate and maintain comfort rounds  - Make Fall Risk Sign visible to staff  - Offer Toileting every 2 Hours, in advance of need  - Initiate/Maintain bedalarm  - Obtain necessary fall risk management equipment: at bedside  - Apply yellow socks and bracelet for high fall risk patients  - Consider moving patient to room near nurses station  Outcome: Progressing  Goal: Maintain or return to baseline ADL function  Description: INTERVENTIONS:  -  Assess patient's ability to carry out ADLs; assess patient's baseline for ADL function and identify physical deficits which impact ability to perform ADLs (bathing, care of mouth/teeth, toileting, grooming, dressing, etc )  - Assess/evaluate cause of self-care deficits   - Assess range of motion  - Assess patient's mobility; develop plan if impaired  - Assess patient's need for assistive devices and provide as appropriate  - Encourage maximum independence but intervene and supervise when necessary  - Involve family in performance of ADLs  - Assess for home care needs following discharge   - Consider OT consult to assist with ADL evaluation and planning for discharge  - Provide patient education as appropriate  Outcome: Progressing  Goal: Maintains/Returns to pre admission functional level  Description: INTERVENTIONS:  - Perform BMAT or MOVE assessment daily    - Set and communicate daily mobility goal to care team and patient/family/caregiver  - Collaborate with rehabilitation services on mobility goals if consulted  - Perform Range of Motion 2 times a day  - Reposition patient every 2 hours    - Dangle patient 2 times a day  - Stand patient 2 times a day  - Ambulate patient 2 times a day  - Out of bed to chair 2 times a day   - Out of bed for meals 2 times a day  - Out of bed for toileting  - Record patient progress and toleration of activity level   Outcome: Progressing     Problem: DISCHARGE PLANNING  Goal: Discharge to home or other facility with appropriate resources  Description: INTERVENTIONS:  - Identify barriers to discharge w/patient and caregiver  - Arrange for needed discharge resources and transportation as appropriate  - Identify discharge learning needs (meds, wound care, etc )  - Arrange for interpretive services to assist at discharge as needed  - Refer to Case Management Department for coordinating discharge planning if the patient needs post-hospital services based on physician/advanced practitioner order or complex needs related to functional status, cognitive ability, or social support system  Outcome: Progressing     Problem: Knowledge Deficit  Goal: Patient/family/caregiver demonstrates understanding of disease process, treatment plan, medications, and discharge instructions  Description: Complete learning assessment and assess knowledge base    Interventions:  - Provide teaching at level of understanding  - Provide teaching via preferred learning methods  Outcome: Progressing     Problem: GENITOURINARY - ADULT  Goal: Maintains or returns to baseline urinary function  Description: INTERVENTIONS:  - Assess urinary function  - Encourage oral fluids to ensure adequate hydration if ordered  - Administer IV fluids as ordered to ensure adequate hydration  - Administer ordered medications as needed  - Offer frequent toileting  - Follow urinary retention protocol if ordered  Outcome: Progressing  Goal: Urinary catheter remains patent  Description: INTERVENTIONS:  - Assess patency of urinary catheter  - If patient has a chronic taylor, consider changing catheter if non-functioning  - Follow guidelines for intermittent irrigation of non-functioning urinary catheter  Outcome: Progressing     Problem: MOBILITY - ADULT  Goal: Maintain or return to baseline ADL function  Description: INTERVENTIONS:  -  Assess patient's ability to carry out ADLs; assess patient's baseline for ADL function and identify physical deficits which impact ability to perform ADLs (bathing, care of mouth/teeth, toileting, grooming, dressing, etc )  - Assess/evaluate cause of self-care deficits   - Assess range of motion  - Assess patient's mobility; develop plan if impaired  - Assess patient's need for assistive devices and provide as appropriate  - Encourage maximum independence but intervene and supervise when necessary  - Involve family in performance of ADLs  - Assess for home care needs following discharge   - Consider OT consult to assist with ADL evaluation and planning for discharge  - Provide patient education as appropriate  Outcome: Progressing  Goal: Maintains/Returns to pre admission functional level  Description: INTERVENTIONS:  - Perform BMAT or MOVE assessment daily    - Set and communicate daily mobility goal to care team and patient/family/caregiver  - Collaborate with rehabilitation services on mobility goals if consulted  - Perform Range of Motion 2 times a day  - Reposition patient every 2 hours    - Dangle patient 2 times a day  - Stand patient 2 times a day  - Ambulate patient 2 times a day  - Out of bed to chair 2 times a day   - Out of bed for meals 2 times a day  - Out of bed for toileting  - Record patient progress and toleration of activity level   Outcome: Progressing     Problem: Potential for Falls  Goal: Patient will remain free of falls  Description: INTERVENTIONS:  - Educate patient/family on patient safety including physical limitations  - Instruct patient to call for assistance with activity   - Consult OT/PT to assist with strengthening/mobility   - Keep Call bell within reach  - Keep bed low and locked with side rails adjusted as appropriate  - Keep care items and personal belongings within reach  - Initiate and maintain comfort rounds  - Make Fall Risk Sign visible to staff  - Offer Toileting every 2 Hours, in advance of need  - Initiate/Maintain bedalarm  - Obtain necessary fall risk management equipment: at bedside  - Apply yellow socks and bracelet for high fall risk patients  - Consider moving patient to room near nurses station  Outcome: Progressing     Problem: DISCHARGE PLANNING - CARE MANAGEMENT  Goal: Discharge to post-acute care or home with appropriate resources  Description: INTERVENTIONS:  - Conduct assessment to determine patient/family and health care team treatment goals, and need for post-acute services based on payer coverage, community resources, and patient preferences, and barriers to discharge  - Address psychosocial, clinical, and financial barriers to discharge as identified in assessment in conjunction with the patient/family and health care team  - Arrange appropriate level of post-acute services according to patients   needs and preference and payer coverage in collaboration with the physician and health care team  - Communicate with and update the patient/family, physician, and health care team regarding progress on the discharge plan  - Arrange appropriate transportation to post-acute venues  Outcome: Progressing

## 2022-02-18 ENCOUNTER — TELEPHONE (OUTPATIENT)
Dept: OTHER | Facility: HOSPITAL | Age: 77
End: 2022-02-18

## 2022-02-18 VITALS
SYSTOLIC BLOOD PRESSURE: 133 MMHG | HEIGHT: 71 IN | WEIGHT: 216.93 LBS | OXYGEN SATURATION: 97 % | RESPIRATION RATE: 20 BRPM | TEMPERATURE: 97.8 F | DIASTOLIC BLOOD PRESSURE: 71 MMHG | BODY MASS INDEX: 30.37 KG/M2 | HEART RATE: 61 BPM

## 2022-02-18 LAB
ERYTHROCYTE [DISTWIDTH] IN BLOOD BY AUTOMATED COUNT: 13.3 % (ref 11.6–15.1)
HCT VFR BLD AUTO: 32.8 % (ref 36.5–49.3)
HGB BLD-MCNC: 10.6 G/DL (ref 12–17)
MCH RBC QN AUTO: 30.8 PG (ref 26.8–34.3)
MCHC RBC AUTO-ENTMCNC: 32.3 G/DL (ref 31.4–37.4)
MCV RBC AUTO: 95 FL (ref 82–98)
PLATELET # BLD AUTO: 127 THOUSANDS/UL (ref 149–390)
PMV BLD AUTO: 11.6 FL (ref 8.9–12.7)
RBC # BLD AUTO: 3.44 MILLION/UL (ref 3.88–5.62)
WBC # BLD AUTO: 7.14 THOUSAND/UL (ref 4.31–10.16)

## 2022-02-18 PROCEDURE — 85027 COMPLETE CBC AUTOMATED: CPT | Performed by: NURSE PRACTITIONER

## 2022-02-18 PROCEDURE — NC001 PR NO CHARGE: Performed by: NURSE PRACTITIONER

## 2022-02-18 PROCEDURE — 99024 POSTOP FOLLOW-UP VISIT: CPT | Performed by: NURSE PRACTITIONER

## 2022-02-18 RX ORDER — DOCUSATE SODIUM 100 MG/1
100 CAPSULE, LIQUID FILLED ORAL 2 TIMES DAILY
Qty: 30 CAPSULE | Refills: 0 | Status: SHIPPED | OUTPATIENT
Start: 2022-02-18 | End: 2022-04-04

## 2022-02-18 RX ORDER — OXYCODONE HYDROCHLORIDE AND ACETAMINOPHEN 5; 325 MG/1; MG/1
1 TABLET ORAL EVERY 4 HOURS PRN
Qty: 10 TABLET | Refills: 0 | Status: SHIPPED | OUTPATIENT
Start: 2022-02-18 | End: 2022-04-04

## 2022-02-18 RX ORDER — OXYBUTYNIN CHLORIDE 5 MG/1
5 TABLET ORAL EVERY 6 HOURS PRN
Qty: 15 TABLET | Refills: 0 | Status: SHIPPED | OUTPATIENT
Start: 2022-02-18 | End: 2022-04-04

## 2022-02-18 RX ORDER — CEPHALEXIN 500 MG/1
500 CAPSULE ORAL EVERY 12 HOURS SCHEDULED
Qty: 6 CAPSULE | Refills: 0 | Status: SHIPPED | OUTPATIENT
Start: 2022-02-18 | End: 2022-02-22

## 2022-02-18 RX ADMIN — HEPARIN SODIUM 5000 UNITS: 5000 INJECTION INTRAVENOUS; SUBCUTANEOUS at 05:30

## 2022-02-18 RX ADMIN — OXYBUTYNIN CHLORIDE 5 MG: 5 TABLET ORAL at 05:30

## 2022-02-18 RX ADMIN — ACETAMINOPHEN 650 MG: 325 TABLET, FILM COATED ORAL at 05:30

## 2022-02-18 RX ADMIN — DOCUSATE SODIUM 100 MG: 100 CAPSULE ORAL at 08:25

## 2022-02-18 NOTE — QUICK NOTE
I saw patient this morning, can flush [place] for just a few small old clots  Urine is pink to light maroon, drains well  Hemoglobin stable, 11 3     Will reassess this afternoon, but appears to be ready to go home  We discussed if he needs repeat cystoscopy and cautery, at this point I do not think he needs it

## 2022-02-18 NOTE — DISCHARGE SUMMARY
Discharge Summary - Joyce Watson 78 y o  male MRN: [id number]    Unit/Bed#: E2 -70 Encounter: [id number]    Admission Date: 2/15/2022     Discharge Date: 02/18/22    HPI: Joyce Watson is a 78 y o  male who presented for TURP by Dr Tami Mora  Patient with known history of BPH with urinary obstruction  Procedure(s):  TURP  Surgeon(s):  Megan Montero MD  2/15/2022    Hospital Course:  Patient presented for TURP for BPH with urinary obstruction  Patient required two additional nights in hospital due to cherry colored urine  Hgb stable last two days  Urine now light maroon with some old small clots irrigated out  Patient will maintain taylor catheter until Tuesday/wednesday of next week  Discharge Diagnosis: BPH with urinary obstruction    Condition at Discharge: good    Discharge Medications:  See after visit summary for reconciled discharge medications provided to patient and family  Patient was discharged home on home medications with the addition of oxybutynin and keflex  Discharge instructions/Information to patient and family:   See after visit summary for written and verbal information which has been provided to patient and family  Provisions for Follow-Up Care:  See after visit summary for information related to follow-up care and any pertinent home health orders  Disposition: Home    Planned Readmission: No    Discharge Statement   I spent 30 minutes discharging the patient  This time was spent on the day of discharge  I had direct contact with the patient on the day of discharge  Additional documentation is required if more than 30 minutes were spent on discharge       Signature:   YUE Celis  Date: 2/18/2022 Time: 11:59 AM

## 2022-02-18 NOTE — TELEPHONE ENCOUNTER
Patient being discharged today with taylor catheter    Please schedule Tuesday or Wednesday taylor removal

## 2022-02-18 NOTE — TELEPHONE ENCOUNTER
Patient still in hospital for CBI  s/p TURP he is scheduled for follow up with Jose Lindo 3/14 @ 8:15   Will needed taylor removal prior

## 2022-02-18 NOTE — PROGRESS NOTES
Progress Note - Urology  Latricia Au 1945, 78 y o  male MRN: [id number]    Unit/Bed#: E2 -01 Encounter: [id number]    * BPH with urinary obstruction  Assessment & Plan  · POD#3 TURP  · Patient with additional overnight stay due to cherry colored urine yesterday  Continued on CBI overnight and held at 6 am    Urine maroon with a few small old appearing clots returned on irrigation  Patent and draining   · Hgb stable   · Discussed with Dr Milly Goldmann and patient stable for discharge   · Villarreal to removed in office next tues or wed         Subjective:   HPI:  Patient is doing well  No overnight issues or catheter problems  Patient is eager for discharge  Objective:  Nursing Rounds: updated RN  Vitals: Blood pressure 133/71, pulse 61, temperature 97 8 °F (36 6 °C), temperature source Tympanic, resp  rate 20, height 5' 11" (1 803 m), weight 98 4 kg (216 lb 14 9 oz), SpO2 97 %  ,Body mass index is 30 26 kg/m²  Physical Exam  Vitals reviewed  Constitutional:       General: He is not in acute distress  Appearance: Normal appearance  He is obese  He is not ill-appearing, toxic-appearing or diaphoretic  HENT:      Head: Normocephalic and atraumatic  Eyes:      Conjunctiva/sclera: Conjunctivae normal    Cardiovascular:      Rate and Rhythm: Normal rate and regular rhythm  Heart sounds: Normal heart sounds  Pulmonary:      Effort: Pulmonary effort is normal  No respiratory distress  Breath sounds: Normal breath sounds  Abdominal:      General: Bowel sounds are normal  There is no distension  Palpations: Abdomen is soft  Tenderness: There is no abdominal tenderness  There is no right CVA tenderness, left CVA tenderness, guarding or rebound  Genitourinary:     Comments: Villarreal catheter patent with light maroon urine off CBI  Manually irrigated few small old appear clots out  Musculoskeletal:         General: Normal range of motion        Cervical back: Normal range of motion  Skin:     General: Skin is warm and dry  Neurological:      General: No focal deficit present  Mental Status: He is alert and oriented to person, place, and time  Psychiatric:         Mood and Affect: Mood normal          Behavior: Behavior normal          Thought Content: Thought content normal          Judgment: Judgment normal              Labs:  Recent Labs     22  0432   WBC 7 14       Recent Labs     22  1019 22  0432   HGB 11 2* 10 6*     Recent Labs     22  1019 22  0432   HCT 33 5* 32 8*         History:    Past Medical History:   Diagnosis Date    Allergic rhinitis due to pollen     last assessed: May 31, 2016    Atypical pigmented skin lesion     last assessed: May 11, 2015    Enlarged prostate     GERD (gastroesophageal reflux disease)     Hemorrhoids     last assessed: May 19, 2014    Hyperlipidemia     Malignant neoplasm of skin     BCCA    Persistent dry cough     last assessed/resolved: 2015    PONV (postoperative nausea and vomiting)     Wears hearing aid     resolved: 2017     Social History     Socioeconomic History    Marital status: /Civil Union     Spouse name: None    Number of children: 1    Years of education: completed graduate school, Master's Degree     Highest education level: None   Occupational History    Occupation: Retired    Tobacco Use    Smoking status: Former Smoker     Quit date:      Years since quittin 1    Smokeless tobacco: Never Used   Vaping Use    Vaping Use: Never used   Substance and Sexual Activity    Alcohol use:  Yes     Alcohol/week: 7 0 standard drinks     Types: 7 Shots of liquor per week     Comment: before dinner - Social     Drug use: No    Sexual activity: Yes     Partners: Female   Other Topics Concern    None   Social History Narrative    CONSUMES 1 CUP OF COFFEE PER DAY            Lives with spouse      Social Determinants of Health     Financial Resource Strain: Not on file   Food Insecurity: Not on file   Transportation Needs: Not on file   Physical Activity: Not on file   Stress: Not on file   Social Connections: Not on file   Intimate Partner Violence: Not on file   Housing Stability: Not on file     Past Surgical History:   Procedure Laterality Date    BACK SURGERY      CARPAL TUNNEL RELEASE Bilateral     CATARACT EXTRACTION Bilateral     COLONOSCOPY      HEMORRHOID SURGERY      NEUROPLASTY / [address] [place] [address] INCL W/DCMPRSN NRV ROOT 1 INTRSPC LUMBR N/A 6/28/2017    Procedure: L4/5 MINIMALLY INVASIVE BILATERAL LAMINOTOMY FROM A LEFT SIDED APPROACH AND MICRODISCECTOMY WITH POSSIBLE EPIDURAL STEROID INJECTION;  Surgeon: Tiffany Claros MD;  [facility];  Service: Neurosurgery    IA TRANSURETHRAL ELEC-SURG PROSTATECTOM N/A 2/15/2022    Procedure: TURP;  Surgeon: Tacos Feliciano MD;  Location: [facility];  Service: Urology    TONSILLECTOMY       Family History   Problem Relation Age of Onset    Anemia Mother     Parkinsonism Father     Anemia Family     Heart disease Family     Other Family         current diet is high in cholesterol     Alcohol abuse Neg Hx     Substance Abuse Neg Hx     Mental illness Neg Hx     Depression Neg Hx        YUE Bocanegra  Date: 2/18/2022 Time: 11:58 AM

## 2022-02-18 NOTE — PLAN OF CARE
Problem: GENITOURINARY - ADULT  Goal: Maintains or returns to baseline urinary function  Description: INTERVENTIONS:  - Assess urinary function  - Encourage oral fluids to ensure adequate hydration if ordered  - Administer IV fluids as ordered to ensure adequate hydration  - Administer ordered medications as needed  - Offer frequent toileting  - Follow urinary retention protocol if ordered  Outcome: Progressing     Patient has CBI still running throughout the night at a slow rate  Urine is pink tinged, without any signs of clots or sediment  Villarreal is being manually irrigated every 4 hours and as needed  It has not needed any extra irrigation tonight  Patient complains of spasms, medications administered appropriately  CBI is ordered to be clamped at 0600 this morning  Patient currently NPO in case of another operation  Will continue to monitor

## 2022-02-18 NOTE — TELEPHONE ENCOUNTER
Pt scheduled for taylor removal with nurse in [facility] on 2/22/22 per Dr Mick Sutton

## 2022-02-18 NOTE — TELEPHONE ENCOUNTER
YUE Celestin 1 hour ago (11:33 AM)          Patient being discharged today with taylor catheter    Please schedule Tuesday or Wednesday taylor removal

## 2022-02-21 ENCOUNTER — TRANSITIONAL CARE MANAGEMENT (OUTPATIENT)
Dept: FAMILY MEDICINE CLINIC | Facility: CLINIC | Age: 77
End: 2022-02-21

## 2022-02-21 NOTE — TELEPHONE ENCOUNTER
Patient called to see if he can take a laxative due to he was discharge and has not had a bowel movement  He wants to know if this is okay since he is getting his taylor removed tomorrow

## 2022-02-21 NOTE — TELEPHONE ENCOUNTER
Spoke to pt s/p TURP on 2/15/22 with Dr Belén Dee  Pt stated he has not had a bowel movement since being released from hospital   He has been taking colace and is still taking oxycodone for pain  Advised pt to try to stop oxycodone and take OTC pain relievers if possible as they can cause constipation  Also advised pt take Miralax and that he may possibly need a fleet enema or suppository  He states he is hydrating well  Pt stated he spoke to his PCP about this issue but they stated he should contact Urology  Pt is coming in to office tomorrow for taylor removal   He will be followed up on this issue at that time

## 2022-02-22 ENCOUNTER — PROCEDURE VISIT (OUTPATIENT)
Dept: UROLOGY | Facility: MEDICAL CENTER | Age: 77
End: 2022-02-22
Payer: COMMERCIAL

## 2022-02-22 VITALS
BODY MASS INDEX: 29.26 KG/M2 | SYSTOLIC BLOOD PRESSURE: 160 MMHG | DIASTOLIC BLOOD PRESSURE: 88 MMHG | HEART RATE: 88 BPM | HEIGHT: 71 IN | WEIGHT: 209 LBS

## 2022-02-22 DIAGNOSIS — N40.1 BPH WITH URINARY OBSTRUCTION: Primary | ICD-10-CM

## 2022-02-22 DIAGNOSIS — N13.8 BPH WITH URINARY OBSTRUCTION: Primary | ICD-10-CM

## 2022-02-22 PROCEDURE — 99024 POSTOP FOLLOW-UP VISIT: CPT

## 2022-02-22 PROCEDURE — 51700 IRRIGATION OF BLADDER: CPT

## 2022-02-22 PROCEDURE — 3008F BODY MASS INDEX DOCD: CPT | Performed by: UROLOGY

## 2022-02-22 NOTE — PROGRESS NOTES
Irrigation of bladder    Date/Time: 2/22/2022 9:20 AM  Performed by: Dionne Oliveira RN  Authorized by: Miracle James MD     Patient location:  Other (comment)  Consent:     Consent obtained:  Verbal    Consent given by:  Patient  Universal protocol:     Procedure explained and questions answered to patient or proxy's satisfaction: yes      Patient identity confirmed:  Verbally with patient  Pre-procedure details:     Procedure purpose:  Therapeutic  Anesthesia (see MAR for exact dosages): Anesthesia method:  None  Procedure details:     Bladder irrigation: no      Urine characteristics:  Bloody  Post-procedure details:     Patient tolerance of procedure: Tolerated well, no immediate complications  Comments:      Urine in drainage bag light red  Irrigated catheter prior to removal with 240 mls sterile water for clear light pink return

## 2022-02-22 NOTE — PROGRESS NOTES
2/22/2022    Favianterra Perezkell  [birth year]  [id number]    Diagnosis  Chief Complaint     BPH with Urinary Obstruction          Patient presents for taylor catheter removal s/p TURP on 2/15/22 managed by Dr Ceci rGayson to TURP appt on 3/14/22    Procedure Taylor removal    Urine in drainage bag light red  Irrigated with 240 mls sterile water for clear  light pink return  Taylor catheter removed after deflation of an intact balloon  Patient tolerated well  Encouraged patient to hydrate well and return this afternoon for post void residual if having difficulty voiding  He  knows he may return early if uncomfortable and unable to urinate  Patient agrees to this plan  Pt stated he had bowel movement yesterday after almost a week  Encouraged pt to remain off narcotic pain meds and to hydrate well  Also advised continued use of colace and fleet enema if unable to have a bowel movement  Spoke to pt in the afternoon and he stated he is voiding without difficulty  He will contact office with any future issues or concerns        Vitals:    02/22/22 0826   BP: 160/88   Pulse: 88   Weight: 94 8 kg (209 lb)   Height: 5' 11" (1 803 m)           Anton Vera RN

## 2022-02-25 ENCOUNTER — NURSE TRIAGE (OUTPATIENT)
Dept: OTHER | Facility: OTHER | Age: 77
End: 2022-02-25

## 2022-02-25 NOTE — TELEPHONE ENCOUNTER
Reason for Disposition   General activity, questions about    Answer Assessment - Initial Assessment Questions  1  SYMPTOM: "What's the main symptom you're concerned about?" (e g , pain, fever, vomiting)      Constipation, blot clot   2  ONSET: "When did symptoms start?"      Monday  3  SURGERY: "What surgery was performed?"      TURP  4  DATE of SURGERY: "When was surgery performed?"       2/15/22  5  ANESTHESIA: " What type of anesthesia did you have?" (e g , general, spinal, epidural, local)      General  6  PAIN: "Is there any pain?" If Yes, ask: "How bad is it?"  (Scale 1-10; or mild, moderate, severe)      Denies  7  FEVER: "Do you have a fever?" If Yes, ask: "What is your temperature, how was it measured, and when did it start?"      Denies  8  VOMITING: "Is there any vomiting?" If yes, ask: "How many times?"      Denies  9  BLEEDING: "Is there any bleeding?" If Yes, ask: "How much?" and "Where?"      Denies  10   OTHER SYMPTOMS: "Do you have any other symptoms?" (e g , drainage from wound, painful urination, constipation)        Painful urination, constipation    Protocols used: POST-OP SYMPTOMS AND QUESTIONS-ADULT-OH

## 2022-02-25 NOTE — TELEPHONE ENCOUNTER
Patient states that he is having issues moving his bowels (going small amounts frequently) and wanted to know if it is okay to continue to take the stool softener he was prescribed in the hospital  He was also having issues passing urine, but this has improved after passing a blood clot this morning  Patient would like to know if this is normal  Patient advised that he can take the stool softeners as ordered until he resumes a normal BM routine again  He was also advised to stop taking them if he has loose stools  He was informed that any kind of trauma can cause  penile bleeding  I told him not to be too concerned since his symptoms have improved, but to call if there any new symptoms or bleeding

## 2022-03-14 ENCOUNTER — OFFICE VISIT (OUTPATIENT)
Dept: UROLOGY | Facility: MEDICAL CENTER | Age: 77
End: 2022-03-14
Payer: COMMERCIAL

## 2022-03-14 VITALS
BODY MASS INDEX: 28.73 KG/M2 | DIASTOLIC BLOOD PRESSURE: 82 MMHG | HEART RATE: 67 BPM | SYSTOLIC BLOOD PRESSURE: 132 MMHG | HEIGHT: 71 IN | WEIGHT: 205.2 LBS

## 2022-03-14 DIAGNOSIS — R35.0 URINARY FREQUENCY: Primary | ICD-10-CM

## 2022-03-14 LAB
POST-VOID RESIDUAL VOLUME, ML POC: 16 ML
SL AMB  POCT GLUCOSE, UA: NORMAL
SL AMB LEUKOCYTE ESTERASE,UA: NORMAL
SL AMB POCT BILIRUBIN,UA: NORMAL
SL AMB POCT BLOOD,UA: NORMAL
SL AMB POCT CLARITY,UA: CLEAR
SL AMB POCT COLOR,UA: YELLOW
SL AMB POCT KETONES,UA: NORMAL
SL AMB POCT NITRITE,UA: NORMAL
SL AMB POCT PH,UA: 5.5
SL AMB POCT SPECIFIC GRAVITY,UA: >=1.03
SL AMB POCT URINE PROTEIN: >=300
SL AMB POCT UROBILINOGEN: 0.2

## 2022-03-14 PROCEDURE — 3008F BODY MASS INDEX DOCD: CPT | Performed by: UROLOGY

## 2022-03-14 PROCEDURE — 99024 POSTOP FOLLOW-UP VISIT: CPT | Performed by: PHYSICIAN ASSISTANT

## 2022-03-14 PROCEDURE — 81002 URINALYSIS NONAUTO W/O SCOPE: CPT | Performed by: PHYSICIAN ASSISTANT

## 2022-03-14 PROCEDURE — 51798 US URINE CAPACITY MEASURE: CPT | Performed by: PHYSICIAN ASSISTANT

## 2022-03-14 RX ORDER — SOLIFENACIN SUCCINATE 10 MG/1
10 TABLET, FILM COATED ORAL DAILY
Qty: 30 TABLET | Refills: 3 | Status: SHIPPED | OUTPATIENT
Start: 2022-03-14 | End: 2022-06-08

## 2022-03-14 NOTE — PROGRESS NOTES
3/14/2022      Chief Complaint   Patient presents with    Urinary Frequency    Post-op         Assessment and Plan    78 y o  male managed by Dr Mick Sutton     1  BPH with urinary obstruction  · S/p TURP on 2/15/22  · PVR today: 16 mL   · AUA score: 35   · Encouraged to discontinue fluids after dinner time and to avoid bladder irritants  · Will start Vesicare 10 mg po daily to help with his irritative urinary symptoms expected in the postop setting  · MOA and common side effects reviewed with patients  · Follow up in 8 weeks for 3 month postop visit  History of Present Illness  Fior Ahumada is a 78 y o  male here for evaluation following TURP on 2/15/22  Procedure revealed large prostate with obstructive bladder  He had persistent hematuria with clots in the postoperative setting but was discharged on 2/18  Patient had his taylor catheter removed on 2/22  Patient states that his stream strength has improved following the procedure  He does note increased urgency and frequency with passing small volumes  He has been drinking 60 oz of water, ginger ale, and tea including after dinner  His nocturia has been a few times at night, especially if he lays flat in the bed  If he lays in his recliner, his nocturia is decreased  He denies any difficulty voiding  He denies any fevers, chills, hematuria, or abdominal/flank pain  He is interested in oral medication to help with his urinary symptoms  Review of Systems   Constitutional: Negative for chills and fever  HENT: Negative  Respiratory: Negative  Cardiovascular: Negative  Gastrointestinal: Negative for abdominal pain, constipation (takes fiber supplements), nausea and vomiting  Genitourinary: Positive for dysuria (progressively improving), frequency (passing small volumes) and urgency (with one or two urge urinary incontinence)  Negative for difficulty urinating, flank pain and hematuria  Notes improvement with stream strength     Notes increased nocturia when laying flat in bed, but notes less when laying in his recliner  When he only passes small volumes, likely secondary to the urgency, he does confirm occasional sensation of incomplete bladder emptying  Musculoskeletal: Negative  Skin: Negative  Neurological: Negative  AUA SYMPTOM SCORE      Most Recent Value   AUA SYMPTOM SCORE    How often have you had a sensation of not emptying your bladder completely after you finished urinating? 5 (P)     How often have you had to urinate again less than two hours after you finished urinating? 5 (P)     How often have you found you stopped and started again several times when you urinate? 5 (P)     How often have you found it difficult to postpone urination? 5 (P)     How often have you had a weak urinary stream? 5 (P)     How often have you had to push or strain to begin urination? 5 (P)     How many times did you most typically get up to urinate from the time you went to bed at night until the time you got up in the morning? 5 (P)     Quality of Life: If you were to spend the rest of your life with your urinary condition just the way it is now, how would you feel about that? 6 (P)     AUA SYMPTOM SCORE 35 (P)            Vitals  Vitals:    03/14/22 0816   BP: 132/82   Pulse: 67   Weight: 93 1 kg (205 lb 3 2 oz)   Height: 5' 11" (1 803 m)       Physical Exam  Constitutional:       General: He is not in acute distress  Appearance: Normal appearance  He is not ill-appearing, toxic-appearing or diaphoretic  HENT:      Head: Normocephalic and atraumatic  Eyes:      Conjunctiva/sclera: Conjunctivae normal    Pulmonary:      Effort: Pulmonary effort is normal  No respiratory distress  Abdominal:      General: There is no distension  Palpations: Abdomen is soft  Tenderness: There is no abdominal tenderness  There is no right CVA tenderness, left CVA tenderness, guarding or rebound     Musculoskeletal:         General: Normal range of motion  Cervical back: Normal range of motion  Skin:     General: Skin is warm and dry  Neurological:      General: No focal deficit present  Mental Status: He is alert and oriented to person, place, and time  Psychiatric:         Mood and Affect: Mood normal          Behavior: Behavior normal          Thought Content: Thought content normal          Judgment: Judgment normal        Past History  Past Medical History:   Diagnosis Date    Allergic rhinitis due to pollen     last assessed: May 31, 2016    Atypical pigmented skin lesion     last assessed: May 11, 2015    Enlarged prostate     GERD (gastroesophageal reflux disease)     Hemorrhoids     last assessed: May 19, 2014    Hyperlipidemia     Malignant neoplasm of skin     BCCA    Persistent dry cough     last assessed/resolved: 2015    PONV (postoperative nausea and vomiting)     Wears hearing aid     resolved: 2017     Social History     Socioeconomic History    Marital status: /Civil Union     Spouse name: None    Number of children: 1    Years of education: completed graduate school, Master's Degree     Highest education level: None   Occupational History    Occupation: Retired    Tobacco Use    Smoking status: Former Smoker     Quit date: [address] [facility]     Years since quittin 2    Smokeless tobacco: Never Used   Vaping Use    Vaping Use: Never used   Substance and Sexual Activity    Alcohol use:  Yes     Alcohol/week: 7 0 standard drinks     Types: 7 Shots of liquor per week     Comment: before dinner - Social     Drug use: No    Sexual activity: Yes     Partners: Female   Other Topics Concern    None   Social History Narrative    CONSUMES 1 CUP OF COFFEE PER DAY            Lives with spouse      Social Determinants of Health     Financial Resource Strain: Not on file   Food Insecurity: Not on file   Transportation Needs: Not on file   Physical Activity: Not on file   Stress: Not on file Social Connections: Not on file   Intimate Partner Violence: Not on file   Housing Stability: Not on file     Social History     Tobacco Use   Smoking Status Former Smoker    Quit date: 200    Years since quittin 2   Smokeless Tobacco Never Used     Family History   Problem Relation Age of Onset    Anemia Mother     Parkinsonism Father     Anemia Family     Heart disease Family     Other Family         current diet is high in cholesterol     Alcohol abuse Neg Hx     Substance Abuse Neg Hx     Mental illness Neg Hx     Depression Neg Hx        The following portions of the patient's history were reviewed and updated as appropriate: allergies, current medications, past medical history, past social history, past surgical history and problem list     Results  Recent Results (from the past 1 hour(s))   POCT Measure PVR    Collection Time: 22  8:18 AM   Result Value Ref Range    POST-VOID RESIDUAL VOLUME, ML POC 16 mL   POCT urine dip    Collection Time: 22  8:25 AM   Result Value Ref Range    LEUKOCYTE ESTERASE,UA trace     NITRITE,UA neg     SL AMB POCT UROBILINOGEN 0 2     POCT URINE PROTEIN >=300      PH,UA 5 5     BLOOD,UA Large     SPECIFIC GRAVITY,UA >=1 030     KETONES,UA neg     BILIRUBIN,UA neg     GLUCOSE, UA neg      COLOR,UA yellow     CLARITY,UA clear    ]  Lab Results   Component Value Date    PSA 0 3 2021    PSA 0 9 2020    PSA 0 6 2019    PSA 0 6 2018     Lab Results   Component Value Date    CALCIUM 8 9 2022     2016    K 4 5 2022    CO2 26 2022     2022    BUN 24 2022    CREATININE 0 99 2022     Lab Results   Component Value Date    WBC 7 14 2022    HGB 10 6 (L) 2022    HCT 32 8 (L) 2022    MCV 95 2022     (L) 2022     Roseanne Méndez PA-C

## 2022-04-04 ENCOUNTER — OFFICE VISIT (OUTPATIENT)
Dept: FAMILY MEDICINE CLINIC | Facility: CLINIC | Age: 77
End: 2022-04-04
Payer: COMMERCIAL

## 2022-04-04 VITALS
HEIGHT: 71 IN | SYSTOLIC BLOOD PRESSURE: 118 MMHG | HEART RATE: 68 BPM | TEMPERATURE: 98 F | DIASTOLIC BLOOD PRESSURE: 74 MMHG | BODY MASS INDEX: 28.87 KG/M2 | OXYGEN SATURATION: 95 % | WEIGHT: 206.2 LBS | RESPIRATION RATE: 18 BRPM

## 2022-04-04 DIAGNOSIS — H61.22 IMPACTED CERUMEN OF LEFT EAR: Primary | ICD-10-CM

## 2022-04-04 PROCEDURE — 1036F TOBACCO NON-USER: CPT | Performed by: FAMILY MEDICINE

## 2022-04-04 PROCEDURE — 99213 OFFICE O/P EST LOW 20 MIN: CPT | Performed by: FAMILY MEDICINE

## 2022-04-04 PROCEDURE — 1160F RVW MEDS BY RX/DR IN RCRD: CPT | Performed by: FAMILY MEDICINE

## 2022-04-04 PROCEDURE — 3008F BODY MASS INDEX DOCD: CPT | Performed by: FAMILY MEDICINE

## 2022-04-04 PROCEDURE — 69210 REMOVE IMPACTED EAR WAX UNI: CPT | Performed by: FAMILY MEDICINE

## 2022-04-04 NOTE — PATIENT INSTRUCTIONS
Use Debrox in right ear a few times a week for 2 weeks  If hearing gets worse or feels blocked return for flushing       Hearing aid brand: InSample

## 2022-04-04 NOTE — PROGRESS NOTES
Assessment/Plan:     1  Impacted cerumen of left ear  Assessment & Plan:  Flushed left ear and tolerated well; advised Debrox PRN; f/u guidance given    Orders:  -     Ear cerumen removal        Subjective:      Patient ID: Tammy Alicia is a 78 y o  male  Has not noticed any hearing issues  No pain  Went to audiologist in January and was told he had wax  Tried some hydrogen peroxide the other day and got some wax out  The following portions of the patient's history were reviewed and updated as appropriate: allergies, current medications, past family history, past medical history, past social history, past surgical history, and problem list     Review of Systems   Constitutional: Negative for chills and fever  HENT: Positive for hearing loss  Negative for ear discharge and ear pain  Objective:      /74   Pulse 68   Temp 98 °F (36 7 °C) (Temporal)   Resp 18   Ht 5' 11" (1 803 m)   Wt 93 5 kg (206 lb 3 2 oz)   SpO2 95%   BMI 28 76 kg/m²          Physical Exam  Vitals reviewed  Constitutional:       General: He is not in acute distress  Appearance: Normal appearance  He is not ill-appearing, toxic-appearing or diaphoretic  HENT:      Head: Normocephalic and atraumatic  Left Ear: There is impacted cerumen  Ears:      Comments: Small amount of cerumen in right but not impacted  Eyes:      General: No scleral icterus  Right eye: No discharge  Left eye: No discharge  Conjunctiva/sclera: Conjunctivae normal    Cardiovascular:      Rate and Rhythm: Normal rate and regular rhythm  Pulses: Normal pulses  Heart sounds: Normal heart sounds  No murmur heard  No gallop  Pulmonary:      Effort: Pulmonary effort is normal  No respiratory distress  Breath sounds: Normal breath sounds  No stridor  No wheezing, rhonchi or rales  Musculoskeletal:      Right lower leg: No edema  Left lower leg: No edema     Neurological:      General: No focal deficit present  Mental Status: He is alert and oriented to person, place, and time  Psychiatric:         Mood and Affect: Mood normal          Behavior: Behavior normal          Thought Content: Thought content normal          Judgment: Judgment normal                Ear cerumen removal    Date/Time: 4/4/2022 1:10 PM  Performed by: Dariel Archer DO  Authorized by: Dariel Archer DO   Universal Protocol:  Consent: Verbal consent obtained  Written consent obtained  Risks and benefits: risks, benefits and alternatives were discussed  Consent given by: patient  Timeout called at: 4/4/2022 12:50 PM   Patient understanding: patient states understanding of the procedure being performed  Patient consent: the patient's understanding of the procedure matches consent given  Procedure consent: procedure consent matches procedure scheduled  Required items: required blood products, implants, devices, and special equipment available  Patient identity confirmed: verbally with patient      Patient location:  Clinic  Procedure details:     Local anesthetic:  None    Location:  L ear    Procedure type: irrigation with instrumentation      Instrumentation: forceps      Approach:  External  Post-procedure details:     Complication:  None    Hearing quality:  Normal    Patient tolerance of procedure:   Tolerated well, no immediate complications

## 2022-05-02 DIAGNOSIS — E78.5 HYPERLIPIDEMIA, UNSPECIFIED HYPERLIPIDEMIA TYPE: ICD-10-CM

## 2022-05-02 RX ORDER — ATORVASTATIN CALCIUM 20 MG/1
TABLET, FILM COATED ORAL
Qty: 90 TABLET | Refills: 1 | Status: SHIPPED | OUTPATIENT
Start: 2022-05-02

## 2022-05-23 ENCOUNTER — OFFICE VISIT (OUTPATIENT)
Dept: UROLOGY | Facility: MEDICAL CENTER | Age: 77
End: 2022-05-23
Payer: COMMERCIAL

## 2022-05-23 VITALS
DIASTOLIC BLOOD PRESSURE: 78 MMHG | SYSTOLIC BLOOD PRESSURE: 132 MMHG | HEIGHT: 71 IN | BODY MASS INDEX: 28.84 KG/M2 | WEIGHT: 206 LBS | HEART RATE: 68 BPM

## 2022-05-23 DIAGNOSIS — N13.8 BPH WITH URINARY OBSTRUCTION: Primary | ICD-10-CM

## 2022-05-23 DIAGNOSIS — N40.1 BPH WITH URINARY OBSTRUCTION: Primary | ICD-10-CM

## 2022-05-23 PROCEDURE — 3008F BODY MASS INDEX DOCD: CPT | Performed by: STUDENT IN AN ORGANIZED HEALTH CARE EDUCATION/TRAINING PROGRAM

## 2022-05-23 PROCEDURE — 1036F TOBACCO NON-USER: CPT | Performed by: STUDENT IN AN ORGANIZED HEALTH CARE EDUCATION/TRAINING PROGRAM

## 2022-05-23 PROCEDURE — 1160F RVW MEDS BY RX/DR IN RCRD: CPT | Performed by: STUDENT IN AN ORGANIZED HEALTH CARE EDUCATION/TRAINING PROGRAM

## 2022-05-23 PROCEDURE — 99213 OFFICE O/P EST LOW 20 MIN: CPT | Performed by: STUDENT IN AN ORGANIZED HEALTH CARE EDUCATION/TRAINING PROGRAM

## 2022-05-23 NOTE — PROGRESS NOTES
5/23/2022    Jazmín Cruz  [id number]  [id number]    HPI:    Jazmín Cruz is a 78 y o  male  presenting in follow-up for BPH  The patient underwent TURP with Dr Christine Steven February 15, 2022  At his last follow-up in March he was complaining of irritative voiding symptoms as well as nocturia that was bothersome  He was started on VESIcare 10 mg which he takes at night and this has significantly helped his nocturia  He does still get up twice at night but this does not bother him  He still complains of bothersome daytime frequency 13-16 times per day  He only drinks 1 cup of coffee daily, has eliminated all carbonated beverages, does not take artificial sweeteners, and only drinks tea once in a while  He has had low PVRs in the past; PVR was 16L last visit  At this time he would like to avoid medications if possible and is not interested in any further procedures      Past Medical History:   Diagnosis Date    Allergic rhinitis due to pollen     last assessed: May 31, 2016    Atypical pigmented skin lesion     last assessed: May 11, 2015    Enlarged prostate     GERD (gastroesophageal reflux disease)     Hemorrhoids     last assessed: May 19, 2014    Hyperlipidemia     Malignant neoplasm of skin     BCCA    Persistent dry cough     last assessed/resolved: July 21, 2015    PONV (postoperative nausea and vomiting)     Wears hearing aid     resolved: June 19, 2017     Past Surgical History:   Procedure Laterality Date    BACK SURGERY      CARPAL TUNNEL RELEASE Bilateral     CATARACT EXTRACTION Bilateral     COLONOSCOPY      HEMORRHOID SURGERY      NEUROPLASTY / TRANSPOSITION MEDIAN NERVE AT CARPAL TUNNEL      OTHER SURGICAL HISTORY      Basal Cell    ME LAMNOTMY INCL W/DCMPRSN NRV ROOT 1 INTRSPC LUMBR N/A 6/28/2017    Procedure: L4/5 MINIMALLY INVASIVE BILATERAL LAMINOTOMY FROM A LEFT SIDED APPROACH AND MICRODISCECTOMY WITH POSSIBLE EPIDURAL STEROID INJECTION;  Surgeon: Kj Miller MD;  [facility];  Service: Neurosurgery    AL TRANSURETHRAL ELEC-SURG PROSTATECTOM N/A 2/15/2022    Procedure: TURP;  Surgeon: Sadia Acevedo MD;  [facility];  Service: Urology    TONSILLECTOMY           Review of Systems   Constitutional: Negative  Respiratory: Negative  Cardiovascular: Negative  Gastrointestinal: Negative  Genitourinary: Positive for frequency (13-16 per day, addl per HPI)  Objective:  /78 (BP Location: Left arm, Patient Position: Sitting, Cuff Size: Adult)   Pulse 68   Ht 5' 11" (1 803 m)   Wt 93 4 kg (206 lb)   BMI 28 73 kg/m²       General:  Healthy appearing male in no acute distress  Head:  Normocephalic, atraumatic  ENMT: Nares patent, moist mucous membranes  Cardiovascular:  Regular rate  Respiratory:  Patient has unlabored respirations  Abdomen:  Abdomen nondistended  Musculoskeletal: Normal gait  Neurological: Grossly intact  Psych: Normal affect        Assessment:  61-year-old male with a history of BPH status post TURP 02/15/2022  The patient does still have bothersome daytime frequency although his nighttime symptoms significantly improved  I discussed the following options with the patient: 1  continuing current therapy; 2  Taking an anticholinergic b i d ; 3  Adding Myrbetriq during the day  At this time he would like to continue taking VESIcare because his symptoms are manageable        Plan:  - Continue Vesicare 10mg QHS    - RTC in 1 year with Dr Claudia Vargas MD

## 2022-06-08 DIAGNOSIS — R35.0 URINARY FREQUENCY: ICD-10-CM

## 2022-06-08 RX ORDER — SOLIFENACIN SUCCINATE 10 MG/1
TABLET, FILM COATED ORAL
Qty: 90 TABLET | Refills: 1 | Status: SHIPPED | OUTPATIENT
Start: 2022-06-08

## 2022-08-29 DIAGNOSIS — E78.5 HYPERLIPIDEMIA, UNSPECIFIED HYPERLIPIDEMIA TYPE: ICD-10-CM

## 2022-08-29 RX ORDER — ATORVASTATIN CALCIUM 20 MG/1
TABLET, FILM COATED ORAL
Qty: 90 TABLET | Refills: 1 | Status: SHIPPED | OUTPATIENT
Start: 2022-08-29

## 2022-10-07 ENCOUNTER — IMMUNIZATIONS (OUTPATIENT)
Dept: FAMILY MEDICINE CLINIC | Facility: CLINIC | Age: 77
End: 2022-10-07
Payer: COMMERCIAL

## 2022-10-07 DIAGNOSIS — Z23 NEED FOR INFLUENZA VACCINATION: Primary | ICD-10-CM

## 2022-10-07 PROCEDURE — 90662 IIV NO PRSV INCREASED AG IM: CPT | Performed by: FAMILY MEDICINE

## 2022-10-07 PROCEDURE — G0008 ADMIN INFLUENZA VIRUS VAC: HCPCS | Performed by: FAMILY MEDICINE

## 2022-10-11 PROBLEM — H61.22 IMPACTED CERUMEN OF LEFT EAR: Status: RESOLVED | Noted: 2022-04-04 | Resolved: 2022-10-11

## 2022-10-26 DIAGNOSIS — R35.0 URINARY FREQUENCY: ICD-10-CM

## 2022-10-26 RX ORDER — SOLIFENACIN SUCCINATE 10 MG/1
10 TABLET, FILM COATED ORAL DAILY
Qty: 90 TABLET | Refills: 3 | Status: SHIPPED | OUTPATIENT
Start: 2022-10-26

## 2022-12-20 LAB
ALBUMIN SERPL-MCNC: 4.4 G/DL (ref 3.6–5.1)
ALBUMIN/GLOB SERPL: 1.6 (CALC) (ref 1–2.5)
ALP SERPL-CCNC: 52 U/L (ref 35–144)
ALT SERPL-CCNC: 18 U/L (ref 9–46)
AST SERPL-CCNC: 13 U/L (ref 10–35)
BILIRUB SERPL-MCNC: 0.8 MG/DL (ref 0.2–1.2)
BUN SERPL-MCNC: 19 MG/DL (ref 7–25)
BUN/CREAT SERPL: ABNORMAL (CALC) (ref 6–22)
CALCIUM SERPL-MCNC: 9.4 MG/DL (ref 8.6–10.3)
CHLORIDE SERPL-SCNC: 102 MMOL/L (ref 98–110)
CHOLEST SERPL-MCNC: 176 MG/DL
CHOLEST/HDLC SERPL: 5.2 (CALC)
CO2 SERPL-SCNC: 29 MMOL/L (ref 20–32)
CREAT SERPL-MCNC: 0.85 MG/DL (ref 0.7–1.28)
ERYTHROCYTE [DISTWIDTH] IN BLOOD BY AUTOMATED COUNT: 13.3 % (ref 11–15)
GFR/BSA.PRED SERPLBLD CYS-BASED-ARV: 91 ML/MIN/1.73M2
GLOBULIN SER CALC-MCNC: 2.7 G/DL (CALC) (ref 1.9–3.7)
GLUCOSE SERPL-MCNC: 104 MG/DL (ref 65–99)
HCT VFR BLD AUTO: 44.3 % (ref 38.5–50)
HDLC SERPL-MCNC: 34 MG/DL
HGB BLD-MCNC: 14.5 G/DL (ref 13.2–17.1)
LDLC SERPL CALC-MCNC: 116 MG/DL (CALC)
MCH RBC QN AUTO: 30.3 PG (ref 27–33)
MCHC RBC AUTO-ENTMCNC: 32.7 G/DL (ref 32–36)
MCV RBC AUTO: 92.7 FL (ref 80–100)
NONHDLC SERPL-MCNC: 142 MG/DL (CALC)
PLATELET # BLD AUTO: 168 THOUSAND/UL (ref 140–400)
PMV BLD REES-ECKER: 11.5 FL (ref 7.5–12.5)
POTASSIUM SERPL-SCNC: 4 MMOL/L (ref 3.5–5.3)
PROT SERPL-MCNC: 7.1 G/DL (ref 6.1–8.1)
PSA SERPL-MCNC: 0.31 NG/ML
RBC # BLD AUTO: 4.78 MILLION/UL (ref 4.2–5.8)
SODIUM SERPL-SCNC: 140 MMOL/L (ref 135–146)
TRIGL SERPL-MCNC: 145 MG/DL
WBC # BLD AUTO: 6.9 THOUSAND/UL (ref 3.8–10.8)

## 2022-12-23 ENCOUNTER — RA CDI HCC (OUTPATIENT)
Dept: OTHER | Facility: HOSPITAL | Age: 77
End: 2022-12-23

## 2022-12-23 NOTE — PROGRESS NOTES
[facility]  coding opportunities       Chart reviewed, no opportunity found: CHART REVIEWED, NO OPPORTUNITY FOUND        Patients Insurance     Medicare Insurance: [facility]          This is a reminder to address all [facility]  (risk adjustment) codes for the year 2023 as patient CELESTE scores reset to zero

## 2022-12-26 LAB
1,5-ANHYDROGLUCITOL SERPL-MCNC: 27.1 MCG/ML (ref 7.3–36.6)
ALBUMIN SERPL-MCNC: 4.4 G/DL (ref 3.6–5.1)
ALBUMIN/GLOB SERPL: 1.6 (CALC) (ref 1–2.5)
ALP SERPL-CCNC: 52 U/L (ref 35–144)
ALT SERPL-CCNC: 18 U/L (ref 9–46)
AST SERPL-CCNC: 13 U/L (ref 10–35)
BILIRUB SERPL-MCNC: 0.8 MG/DL (ref 0.2–1.2)
BUN SERPL-MCNC: 19 MG/DL (ref 7–25)
BUN/CREAT SERPL: ABNORMAL (CALC) (ref 6–22)
CALCIUM SERPL-MCNC: 9.4 MG/DL (ref 8.6–10.3)
CHLORIDE SERPL-SCNC: 102 MMOL/L (ref 98–110)
CHOLEST SERPL-MCNC: 176 MG/DL
CHOLEST/HDLC SERPL: 5.2 (CALC)
CO2 SERPL-SCNC: 29 MMOL/L (ref 20–32)
CREAT SERPL-MCNC: 0.85 MG/DL (ref 0.7–1.28)
ERYTHROCYTE [DISTWIDTH] IN BLOOD BY AUTOMATED COUNT: 13.3 % (ref 11–15)
GFR/BSA.PRED SERPLBLD CYS-BASED-ARV: 91 ML/MIN/1.73M2
GLOBULIN SER CALC-MCNC: 2.7 G/DL (CALC) (ref 1.9–3.7)
GLUCOSE SERPL-MCNC: 104 MG/DL (ref 65–99)
HCT VFR BLD AUTO: 44.3 % (ref 38.5–50)
HDLC SERPL-MCNC: 34 MG/DL
HGB BLD-MCNC: 14.5 G/DL (ref 13.2–17.1)
LDLC SERPL CALC-MCNC: 116 MG/DL (CALC)
MCH RBC QN AUTO: 30.3 PG (ref 27–33)
MCHC RBC AUTO-ENTMCNC: 32.7 G/DL (ref 32–36)
MCV RBC AUTO: 92.7 FL (ref 80–100)
NONHDLC SERPL-MCNC: 142 MG/DL (CALC)
PLATELET # BLD AUTO: 168 THOUSAND/UL (ref 140–400)
PMV BLD REES-ECKER: 11.5 FL (ref 7.5–12.5)
POTASSIUM SERPL-SCNC: 4 MMOL/L (ref 3.5–5.3)
PROT SERPL-MCNC: 7.1 G/DL (ref 6.1–8.1)
PSA SERPL-MCNC: 0.31 NG/ML
RBC # BLD AUTO: 4.78 MILLION/UL (ref 4.2–5.8)
SODIUM SERPL-SCNC: 140 MMOL/L (ref 135–146)
TRIGL SERPL-MCNC: 145 MG/DL
WBC # BLD AUTO: 6.9 THOUSAND/UL (ref 3.8–10.8)

## 2023-01-03 ENCOUNTER — OFFICE VISIT (OUTPATIENT)
Dept: FAMILY MEDICINE CLINIC | Facility: CLINIC | Age: 78
End: 2023-01-03

## 2023-01-03 VITALS
HEART RATE: 74 BPM | HEIGHT: 71 IN | DIASTOLIC BLOOD PRESSURE: 74 MMHG | OXYGEN SATURATION: 94 % | BODY MASS INDEX: 29.29 KG/M2 | SYSTOLIC BLOOD PRESSURE: 130 MMHG | TEMPERATURE: 97.7 F | WEIGHT: 209.2 LBS

## 2023-01-03 DIAGNOSIS — D69.6 THROMBOCYTOPENIA (HCC): ICD-10-CM

## 2023-01-03 DIAGNOSIS — J31.0 CHRONIC RHINITIS: ICD-10-CM

## 2023-01-03 DIAGNOSIS — E78.5 HYPERLIPIDEMIA, UNSPECIFIED HYPERLIPIDEMIA TYPE: ICD-10-CM

## 2023-01-03 DIAGNOSIS — Z00.00 MEDICARE ANNUAL WELLNESS VISIT, SUBSEQUENT: ICD-10-CM

## 2023-01-03 DIAGNOSIS — H69.81 EUSTACHIAN TUBE DYSFUNCTION, RIGHT: ICD-10-CM

## 2023-01-03 DIAGNOSIS — K21.9 GASTROESOPHAGEAL REFLUX DISEASE WITHOUT ESOPHAGITIS: ICD-10-CM

## 2023-01-03 DIAGNOSIS — N40.0 ENLARGED PROSTATE WITHOUT LOWER URINARY TRACT SYMPTOMS (LUTS): Primary | ICD-10-CM

## 2023-01-03 DIAGNOSIS — H61.21 RIGHT EAR IMPACTED CERUMEN: ICD-10-CM

## 2023-01-03 RX ORDER — OMEPRAZOLE 20 MG/1
20 CAPSULE, DELAYED RELEASE ORAL DAILY
Qty: 30 CAPSULE | Refills: 2 | Status: SHIPPED | OUTPATIENT
Start: 2023-01-03

## 2023-01-03 RX ORDER — FLUTICASONE PROPIONATE 50 MCG
2 SPRAY, SUSPENSION (ML) NASAL DAILY
Qty: 48 ML | Refills: 1 | Status: SHIPPED | OUTPATIENT
Start: 2023-01-03

## 2023-01-03 RX ORDER — ATORVASTATIN CALCIUM 20 MG/1
20 TABLET, FILM COATED ORAL DAILY
Qty: 90 TABLET | Refills: 1 | Status: SHIPPED | OUTPATIENT
Start: 2023-01-03

## 2023-01-03 NOTE — PROGRESS NOTES
Assessment and Plan:     Problem List Items Addressed This Visit        Respiratory    Chronic rhinitis    Relevant Medications    fluticasone (FLONASE) 50 mcg/act nasal spray       Nervous and Auditory    Eustachian tube dysfunction, right       Hematopoietic and Hemostatic    Thrombocytopenia (HCC)    Relevant Orders    CBC       Other    Hyperlipidemia    Relevant Medications    atorvastatin (LIPITOR) 20 mg tablet    Other Relevant Orders    Comprehensive metabolic panel    Lipid Panel with Direct LDL reflex   Other Visit Diagnoses     Enlarged prostate without lower urinary tract symptoms (luts)    -  Primary    Gastroesophageal reflux disease without esophagitis        Relevant Medications    omeprazole (PriLOSEC) 20 mg delayed release capsule    Medicare annual wellness visit, subsequent        Right ear impacted cerumen        Relevant Medications    carbamide peroxide (DEBROX) 6 5 % otic solution           Preventive health issues were discussed with patient, and age appropriate screening tests were ordered as noted in patient's After Visit Summary  Personalized health advice and appropriate referrals for health education or preventive services given if needed, as noted in patient's After Visit Summary       History of Present Illness:     Patient presents for a Medicare Wellness Visit    HPI   Patient Care Team:  Jacob Chilel DO as PCP - General  Jacob Chilel DO as PCP - [facility]CP-[facility]  DO Conor Lim MD     Review of Systems:     Review of Systems     Problem List:     Patient Active Problem List   Diagnosis   • Lumbar stenosis with neurogenic claudication   • Vitamin D deficiency   • Eustachian tube dysfunction, right   • Claustrophobia   • Mixed erectile dysfunction   • Hyperlipidemia   • Insomnia   • Irritable bowel syndrome (IBS)   • Pre-diabetes   • Chronic rhinitis   • Thrombocytopenia (HCC)   • BPH with urinary obstruction   • Decreased libido   • Basal cell carcinoma of skin of left upper limb, including shoulder   • History of malignant neoplasm of skin   • Solar degeneration      Past Medical and Surgical History:     Past Medical History:   Diagnosis Date   • Allergic rhinitis due to pollen     last assessed: May 31, 2016   • Atypical pigmented skin lesion     last assessed: May 11, 2015   • Enlarged prostate    • GERD (gastroesophageal reflux disease)    • Hemorrhoids     last assessed: May 19, 2014   • Hyperlipidemia    • Malignant neoplasm of skin     BCCA   • Persistent dry cough     last assessed/resolved: July 21, 2015   • PONV (postoperative nausea and vomiting)    • Wears hearing aid     resolved: June 19, 2017     Past Surgical History:   Procedure Laterality Date   • BACK SURGERY     • CARPAL TUNNEL RELEASE Bilateral    • CATARACT EXTRACTION Bilateral    • COLONOSCOPY     • HEMORRHOID SURGERY     • NEUROPLASTY / TRANSPOSITION MEDIAN NERVE AT CARPAL TUNNEL     • OTHER SURGICAL HISTORY      Basal Cell   • NH LAMNOTMY INCL W/DCMPRSN NRV ROOT 1 INTRSPC LUMBR N/A 6/28/2017    Procedure: L4/5 MINIMALLY INVASIVE BILATERAL LAMINOTOMY FROM A LEFT SIDED APPROACH AND MICRODISCECTOMY WITH POSSIBLE EPIDURAL STEROID INJECTION;  Surgeon: Bijan Jackson MD;  [facility];  Service: Neurosurgery   • NH TRURL ELECTROSURG [facility] BLEED COMPLETE N/A 2/15/2022    Procedure: TURP;  Surgeon: Markell Barahona MD;  [facility];  Service: Urology   • TONSILLECTOMY        Family History:     Family History   Problem Relation Age of Onset   • Anemia Mother    • Parkinsonism Father    • Anemia Family    • Heart disease Family    • Other Family         current diet is high in cholesterol    • Alcohol abuse Neg Hx    • Substance Abuse Neg Hx    • Mental illness Neg Hx    • Depression Neg Hx       Social History:     Social History     Socioeconomic History   • Marital status: /Civil Union     Spouse name: None   • Number of children: 1   • Years of education: completed graduate school, Master's Degree    • Highest education level: None   Occupational History   • Occupation: Retired    Tobacco Use   • Smoking status: Former     Types: Cigarettes     Quit date:      Years since quittin 0   • Smokeless tobacco: Never   Vaping Use   • Vaping Use: Never used   Substance and Sexual Activity   • Alcohol use: Yes     Alcohol/week: 7 0 standard drinks     Types: 7 Shots of liquor per week     Comment: before dinner - Social    • Drug use: No   • Sexual activity: Yes     Partners: Female   Other Topics Concern   • None   Social History Narrative    CONSUMES 1 CUP OF COFFEE PER DAY            Lives with spouse      Social Determinants of Health     Financial Resource Strain: Low Risk    • Difficulty of Paying Living Expenses: Not hard at all   Food Insecurity: Not on file   Transportation Needs: No Transportation Needs   • Lack of Transportation (Medical): No   • Lack of Transportation (Non-Medical):  No   Physical Activity: Not on file   Stress: Not on file   Social Connections: Not on file   Intimate Partner Violence: Not on file   Housing Stability: Not on file      Medications and Allergies:     Current Outpatient Medications   Medication Sig Dispense Refill   • atorvastatin (LIPITOR) 20 mg tablet Take 1 tablet (20 mg total) by mouth daily 90 tablet 1   • carbamide peroxide (DEBROX) 6 5 % otic solution Administer 5 drops to the right ear 2 (two) times a day 15 mL 0   • Cholecalciferol 125 MCG (5000 UT) capsule Take 5,000 Units by mouth daily       • clobetasol (TEMOVATE) 0 05 % cream Apply 1 application topically 2 (two) times a day       • fluticasone (FLONASE) 50 mcg/act nasal spray 2 sprays into each nostril daily 48 mL 1   • omeprazole (PriLOSEC) 20 mg delayed release capsule Take 1 capsule (20 mg total) by mouth daily 30 capsule 2   • solifenacin (VESICARE) 10 MG tablet Take 1 tablet (10 mg total) by mouth daily 90 tablet 3   • tadalafil (CIALIS) 20 MG tablet Take 1 tablet (20 mg total) by mouth daily as needed for erectile dysfunction 15 tablet 3     No current facility-administered medications for this visit  Allergies   Allergen Reactions   • Latex Rash     Seems Ok with paper tape but patient thinks allergic to both latex and adhesive   • Other Rash     Adhesive tapes      Immunizations:     Immunization History   Administered Date(s) Administered   • COVID-19 MODERNA VACC 0 5 ML IM 03/23/2021, 04/22/2021, 01/04/2022   • H1N1, All Formulations 03/23/2010   • INFLUENZA 11/10/2014, 11/23/2015, 10/01/2016   • Influenza Split High Dose Preservative Free IM 11/10/2014, 11/23/2015, 10/01/2016, 09/15/2017   • Influenza, high dose seasonal 0 7 mL 10/10/2018, 10/08/2019, 10/13/2020, 10/05/2021, 10/07/2022   • Influenza, seasonal, injectable 10/15/2012, 11/06/2013   • Pneumococcal Conjugate 13-Valent 11/23/2015   • Pneumococcal Polysaccharide PPV23 10/23/2012      Health Maintenance:         Topic Date Due   • Colorectal Cancer Screening  07/16/2024   • Hepatitis C Screening  Completed         Topic Date Due   • Hepatitis B Vaccine (1 of 3 - 3-dose series) Never done   • COVID-19 Vaccine (4 - Booster for Link Gerold series) 03/01/2022      Medicare Screening Tests and Risk Assessments:     Anabel Stacy is here for his Subsequent Wellness visit  Last Medicare Wellness visit information reviewed, patient interviewed, no change since last AWV  Health Risk Assessment:   Patient rates overall health as very good  Patient feels that their physical health rating is same  Patient is very satisfied with their life  Eyesight was rated as same  Hearing was rated as same  Patient feels that their emotional and mental health rating is same  Patients states they are sometimes angry  Patient states they are sometimes unusually tired/fatigued  Pain experienced in the last 7 days has been none  Patient states that he has experienced no weight loss or gain in last 6 months       Depression Screening:   PHQ-2 Score: 0      Fall Risk Screening: In the past year, patient has experienced: no history of falling in past year      Home Safety:  Patient does not have trouble with stairs inside or outside of their home  Patient has working smoke alarms and has no working carbon monoxide detector  Home safety hazards include: none  Nutrition:   Current diet is Regular  Medications:   Patient is currently taking over-the-counter supplements  OTC medications include: see medication list  Patient is able to manage medications  Activities of Daily Living (ADLs)/Instrumental Activities of Daily Living (IADLs):   Walk and transfer into and out of bed and chair?: Yes  Dress and groom yourself?: Yes    Bathe or shower yourself?: Yes    Feed yourself? Yes  Do your laundry/housekeeping?: Yes  Manage your money, pay your bills and track your expenses?: Yes  Make your own meals?: Yes    Do your own shopping?: Yes    Previous Hospitalizations:   Any hospitalizations or ED visits within the last 12 months?: Yes    How many hospitalizations have you had in the last year?: 1-2    Advance Care Planning:   Living will: Yes    Durable POA for healthcare:  Yes    Advanced directive: Yes    Advanced directive counseling given: Yes    Five wishes given: Yes    Patient declined ACP directive: No    End of Life Decisions reviewed with patient: Yes    Provider agrees with end of life decisions: Yes      Cognitive Screening:   Provider or family/friend/caregiver concerned regarding cognition?: No    PREVENTIVE SCREENINGS      Cardiovascular Screening:    General: Screening Not Indicated, History Lipid Disorder, Risks and Benefits Discussed and Screening Current      Diabetes Screening:     General: Screening Current and Risks and Benefits Discussed      Colorectal Cancer Screening:     General: Screening Current and Risks and Benefits Discussed      Prostate Cancer Screening:    General: Screening Not Indicated and Risks and Benefits Discussed      Osteoporosis Screening:    General: Risks and Benefits Discussed and Screening Not Indicated      Abdominal Aortic Aneurysm (AAA) Screening:    Risk factors include: age between 73-69 yo and tobacco use        General: Risks and Benefits Discussed and Screening Not Indicated      Lung Cancer Screening:     General: Screening Not Indicated and Risks and Benefits Discussed      Hepatitis C Screening:    General: Screening Current and Risks and Benefits Discussed    Screening, Brief Intervention, and Referral to Treatment (SBIRT)    Screening  Typical number of drinks in a day: 1  Typical number of drinks in a week: 7  Interpretation: Low risk drinking behavior  AUDIT-C Screenin) How often did you have a drink containing alcohol in the past year? 2 to 4 times a month  3) How often did you have 6 or more drinks on one occasion in the past year? never    Single Item Drug Screening:  How often have you used an illegal drug (including marijuana) or a prescription medication for non-medical reasons in the past year? never    Single Item Drug Screen Score: 0  Interpretation: Negative screen for possible drug use disorder    No results found  Physical Exam:     /74   Pulse 74   Temp 97 7 °F (36 5 °C) (Tympanic)   Ht 5' 11" (1 803 m)   Wt 94 9 kg (209 lb 3 2 oz)   SpO2 94%   BMI 29 18 kg/m²     Physical Exam  Vitals reviewed  Constitutional:       General: He is not in acute distress  Appearance: He is well-developed  He is not diaphoretic  HENT:      Head: Normocephalic and atraumatic  No right periorbital erythema or left periorbital erythema  Right Ear: Tympanic membrane normal  No decreased hearing noted  Left Ear: Tympanic membrane normal  No decreased hearing noted  Nose: Nose normal       Right Sinus: No maxillary sinus tenderness or frontal sinus tenderness  Left Sinus: No maxillary sinus tenderness or frontal sinus tenderness  Mouth/Throat:      Lips: Pink  Mouth: Mucous membranes are moist       Pharynx: No oropharyngeal exudate  Tonsils: No tonsillar exudate or tonsillar abscesses  Eyes:      General: Lids are normal       Extraocular Movements: Extraocular movements intact  Conjunctiva/sclera: Conjunctivae normal       Pupils: Pupils are equal, round, and reactive to light  Neck:      Thyroid: No thyroid mass  Trachea: Trachea normal    Cardiovascular:      Rate and Rhythm: Normal rate and regular rhythm  Pulses: Normal pulses  Heart sounds: Normal heart sounds  No murmur heard  No friction rub  No gallop  Pulmonary:      Effort: Pulmonary effort is normal  No respiratory distress  Breath sounds: Normal breath sounds  No wheezing or rales  Abdominal:      General: Bowel sounds are normal  There is no distension  Palpations: Abdomen is soft  There is no mass  Tenderness: There is no abdominal tenderness  There is no guarding  Musculoskeletal:         General: Normal range of motion  Cervical back: Normal range of motion and neck supple  Skin:     General: Skin is warm and dry  Coloration: Skin is not pale  Findings: No erythema or rash  Neurological:      Mental Status: He is alert and oriented to person, place, and time  Cranial Nerves: No cranial nerve deficit  Coordination: Coordination normal    Psychiatric:         Attention and Perception: Attention and perception normal          Mood and Affect: Mood and affect normal          Speech: Speech normal          Behavior: Behavior normal          Thought Content:  Thought content normal          Cognition and Memory: Cognition and memory normal          Judgment: Judgment normal           Ihab Abdelaal, DO

## 2023-01-04 ENCOUNTER — RA CDI HCC (OUTPATIENT)
Dept: OTHER | Facility: HOSPITAL | Age: 78
End: 2023-01-04

## 2023-01-04 NOTE — PROGRESS NOTES
[facility]  coding opportunities       Chart reviewed, no opportunity found: CHART REVIEWED, NO OPPORTUNITY FOUND        Patients Insurance     Medicare Insurance: [facility] Advantage

## 2023-01-04 NOTE — PROGRESS NOTES
Assessment/Plan:   1  Hyperlipidemia, unspecified hyperlipidemia type  Panel appears to be improving  At this time, continue with a strict low-fat/low-carb diet  Continues well with current treatment of atorvastatin  Recheck blood work in 6 months  - atorvastatin (LIPITOR) 20 mg tablet; Take 1 tablet (20 mg total) by mouth daily  Dispense: 90 tablet; Refill: 1  - Comprehensive metabolic panel; Future  - Lipid Panel with Direct LDL reflex; Future    2  Chronic rhinitis  Stable  We will continue with his current use of fluticasone nasal spray  - fluticasone (FLONASE) 50 mcg/act nasal spray; 2 sprays into each nostril daily  Dispense: 48 mL; Refill: 1    3  Enlarged prostate without lower urinary tract symptoms (luts)  Patient is status post a transurethral prostate resection  PSA testing has been stable  This time, continue with his current treatment of Cialis  He is not sure if his Elpidio Coleman has been effective for him  He will hold off on this treatment  4  Gastroesophageal reflux disease without esophagitis  Reviewed patient's symptoms today  At this time, it is unclear if his Elpidio Coleman is causing his reflux symptoms  We will start treatment with omeprazole  Continue with dietary trigger avoidance  - omeprazole (PriLOSEC) 20 mg delayed release capsule; Take 1 capsule (20 mg total) by mouth daily  Dispense: 30 capsule; Refill: 2    5  Right ear impacted cerumen  Reviewed patient symptoms today  At time, symptoms appear likely secondary to a right cerumen impaction  He was advised to use Debrox eardrops for the next 3 days  He will follow-up on Friday for cerumen removal   - carbamide peroxide (DEBROX) 6 5 % otic solution; Administer 5 drops to the right ear 2 (two) times a day  Dispense: 15 mL; Refill: 0    8  Thrombocytopenia ([address] )  - CBC;  Future           Diagnoses and all orders for this visit:    Enlarged prostate without lower urinary tract symptoms (luts)    Hyperlipidemia, unspecified hyperlipidemia type  -     atorvastatin (LIPITOR) 20 mg tablet; Take 1 tablet (20 mg total) by mouth daily  -     Comprehensive metabolic panel; Future  -     Lipid Panel with Direct LDL reflex; Future    Chronic rhinitis  -     fluticasone (FLONASE) 50 mcg/act nasal spray; 2 sprays into each nostril daily    Gastroesophageal reflux disease without esophagitis  -     omeprazole (PriLOSEC) 20 mg delayed release capsule; Take 1 capsule (20 mg total) by mouth daily    Eustachian tube dysfunction, right    Medicare annual wellness visit, subsequent    Right ear impacted cerumen  -     carbamide peroxide (DEBROX) 6 5 % otic solution; Administer 5 drops to the right ear 2 (two) times a day    Thrombocytopenia (HCC)  -     CBC; Future          Subjective:       Chief Complaint   Patient presents with   • Medicare Wellness Visit     Subsequent    • Follow-up     BW review , has had reflex issues      Patient ID: Kathia Martin is a 78 y o  male presents today for a follow-up on his chronic this  He has dyslipidemia, chronic rhinitis, enlarged prostate, GERD, as well as previous right eustachian tube dysfunction  Has been taking his medications regularly  He denies adverse reactions with this medication  HPI    Review of Systems   Constitutional: Negative for activity change, chills, fatigue and fever  HENT: Negative for congestion, ear pain, sinus pressure and sore throat  Eyes: Negative for redness, itching and visual disturbance  Respiratory: Negative for cough and shortness of breath  Cardiovascular: Negative for chest pain and palpitations  Gastrointestinal: Negative for abdominal pain, diarrhea and nausea  Endocrine: Negative for cold intolerance and heat intolerance  Genitourinary: Negative for dysuria, flank pain and frequency  Musculoskeletal: Negative for arthralgias, back pain, gait problem and myalgias  Skin: Negative for color change     Allergic/Immunologic: Negative for environmental allergies  Neurological: Negative for dizziness, numbness and headaches  Psychiatric/Behavioral: Negative for behavioral problems and sleep disturbance  The following portions of the patient's history were reviewed and updated as appropriate : past family history, past medical history, past social history and past surgical history  Current Outpatient Medications:   •  atorvastatin (LIPITOR) 20 mg tablet, Take 1 tablet (20 mg total) by mouth daily, Disp: 90 tablet, Rfl: 1  •  carbamide peroxide (DEBROX) 6 5 % otic solution, Administer 5 drops to the right ear 2 (two) times a day, Disp: 15 mL, Rfl: 0  •  Cholecalciferol 125 MCG (5000 UT) capsule, Take 5,000 Units by mouth daily  , Disp: , Rfl:   •  clobetasol (TEMOVATE) 0 05 % cream, Apply 1 application topically 2 (two) times a day  , Disp: , Rfl:   •  fluticasone (FLONASE) 50 mcg/act nasal spray, 2 sprays into each nostril daily, Disp: 48 mL, Rfl: 1  •  omeprazole (PriLOSEC) 20 mg delayed release capsule, Take 1 capsule (20 mg total) by mouth daily, Disp: 30 capsule, Rfl: 2  •  solifenacin (VESICARE) 10 MG tablet, Take 1 tablet (10 mg total) by mouth daily, Disp: 90 tablet, Rfl: 3  •  tadalafil (CIALIS) 20 MG tablet, Take 1 tablet (20 mg total) by mouth daily as needed for erectile dysfunction, Disp: 15 tablet, Rfl: 3         Objective:         Vitals:    01/03/23 1139   BP: 130/74   Pulse: 74   Temp: 97 7 °F (36 5 °C)   TempSrc: Tympanic   SpO2: 94%   Weight: 94 9 kg (209 lb 3 2 oz)   Height: 5' 11" (1 803 m)     Physical Exam  Vitals reviewed  Constitutional:       Appearance: He is well-developed  HENT:      Head: Normocephalic and atraumatic  Nose: Nose normal       Mouth/Throat:      Pharynx: No oropharyngeal exudate  Eyes:      General: No scleral icterus  Right eye: No discharge  Left eye: No discharge  Pupils: Pupils are equal, round, and reactive to light  Neck:      Trachea: No tracheal deviation  Cardiovascular:      Rate and Rhythm: Normal rate and regular rhythm  Pulses:           Dorsalis pedis pulses are 2+ on the right side and 2+ on the left side  Posterior tibial pulses are 2+ on the right side and 2+ on the left side  Heart sounds: Normal heart sounds  No murmur heard  No friction rub  No gallop  Pulmonary:      Effort: Pulmonary effort is normal  No respiratory distress  Breath sounds: Normal breath sounds  No wheezing or rales  Abdominal:      General: Bowel sounds are normal  There is no distension  Palpations: Abdomen is soft  Tenderness: There is no abdominal tenderness  There is no guarding or rebound  Musculoskeletal:         General: Normal range of motion  Cervical back: Normal range of motion and neck supple  Lymphadenopathy:      Head:      Right side of head: No submental or submandibular adenopathy  Left side of head: No submental or submandibular adenopathy  Cervical: No cervical adenopathy  Right cervical: No superficial, deep or posterior cervical adenopathy  Left cervical: No superficial, deep or posterior cervical adenopathy  Skin:     General: Skin is warm and dry  Findings: No erythema  Neurological:      Mental Status: He is alert and oriented to person, place, and time  Cranial Nerves: No cranial nerve deficit  Sensory: No sensory deficit  Psychiatric:         Mood and Affect: Mood is not anxious or depressed  Speech: Speech normal          Behavior: Behavior normal          Thought Content:  Thought content normal          Judgment: Judgment normal

## 2023-01-06 ENCOUNTER — OFFICE VISIT (OUTPATIENT)
Dept: FAMILY MEDICINE CLINIC | Facility: CLINIC | Age: 78
End: 2023-01-06

## 2023-01-06 VITALS
HEART RATE: 82 BPM | OXYGEN SATURATION: 96 % | DIASTOLIC BLOOD PRESSURE: 78 MMHG | BODY MASS INDEX: 29.43 KG/M2 | SYSTOLIC BLOOD PRESSURE: 130 MMHG | HEIGHT: 71 IN | WEIGHT: 210.2 LBS | TEMPERATURE: 96.6 F

## 2023-01-06 DIAGNOSIS — H69.81 EUSTACHIAN TUBE DYSFUNCTION, RIGHT: ICD-10-CM

## 2023-01-06 DIAGNOSIS — Z12.5 SCREENING FOR PROSTATE CANCER: Primary | ICD-10-CM

## 2023-01-25 DIAGNOSIS — K21.9 GASTROESOPHAGEAL REFLUX DISEASE WITHOUT ESOPHAGITIS: ICD-10-CM

## 2023-01-25 RX ORDER — OMEPRAZOLE 20 MG/1
CAPSULE, DELAYED RELEASE ORAL
Qty: 90 CAPSULE | Refills: 1 | Status: SHIPPED | OUTPATIENT
Start: 2023-01-25

## 2023-03-14 ENCOUNTER — OFFICE VISIT (OUTPATIENT)
Dept: FAMILY MEDICINE CLINIC | Facility: CLINIC | Age: 78
End: 2023-03-14

## 2023-03-14 VITALS
RESPIRATION RATE: 16 BRPM | DIASTOLIC BLOOD PRESSURE: 80 MMHG | OXYGEN SATURATION: 94 % | HEIGHT: 71 IN | BODY MASS INDEX: 29.12 KG/M2 | HEART RATE: 70 BPM | SYSTOLIC BLOOD PRESSURE: 140 MMHG | TEMPERATURE: 97.8 F | WEIGHT: 208 LBS

## 2023-03-14 DIAGNOSIS — J20.9 ACUTE BRONCHITIS, UNSPECIFIED ORGANISM: Primary | ICD-10-CM

## 2023-03-14 RX ORDER — DOXYCYCLINE 100 MG/1
100 TABLET ORAL 2 TIMES DAILY
Qty: 14 TABLET | Refills: 0 | Status: SHIPPED | OUTPATIENT
Start: 2023-03-14 | End: 2023-03-21

## 2023-03-14 NOTE — PROGRESS NOTES
Assessment/Plan:   1  Acute bronchitis, unspecified organism  Start supportive care  Maintain hydration  Take with Mucinex  Start treatment with doxycycline  Patient was advised that symptoms may possibly also secondary to his reflux disease  If his symptoms are not improving, will consider starting omeprazole again   - doxycycline (ADOXA) 100 MG tablet; Take 1 tablet (100 mg total) by mouth 2 (two) times a day for 7 days  Dispense: 14 tablet; Refill: 0           There are no diagnoses linked to this encounter  Subjective:       Chief Complaint   Patient presents with   • Cough      Patient ID: Mani Baer is a 78 y o  male  Cough  This is a new problem  Episode onset: 6 wks ago  The problem has been unchanged  The cough is productive of sputum  Pertinent negatives include no chest pain, chills, ear congestion, ear pain, eye redness, fever, headaches, myalgias, nasal congestion, postnasal drip, sore throat, shortness of breath or wheezing  Nothing aggravates the symptoms  Treatments tried: Mucinex, cough medicine  The treatment provided no relief  There is no history of environmental allergies  Review of Systems   Constitutional: Negative for activity change, chills, fatigue and fever  HENT: Negative for congestion, ear pain, postnasal drip, sinus pressure and sore throat  Eyes: Negative for redness, itching and visual disturbance  Respiratory: Positive for cough  Negative for shortness of breath and wheezing  Cardiovascular: Negative for chest pain and palpitations  Gastrointestinal: Negative for abdominal pain, diarrhea and nausea  Endocrine: Negative for cold intolerance and heat intolerance  Genitourinary: Negative for dysuria, flank pain and frequency  Musculoskeletal: Negative for arthralgias, back pain, gait problem and myalgias  Skin: Negative for color change  Allergic/Immunologic: Negative for environmental allergies     Neurological: Negative for dizziness, numbness and headaches  Psychiatric/Behavioral: Negative for behavioral problems and sleep disturbance  The following portions of the patient's history were reviewed and updated as appropriate : past family history, past medical history, past social history and past surgical history  Current Outpatient Medications:   •  atorvastatin (LIPITOR) 20 mg tablet, Take 1 tablet (20 mg total) by mouth daily, Disp: 90 tablet, Rfl: 1  •  Cholecalciferol 125 MCG (5000 UT) capsule, Take 5,000 Units by mouth daily  , Disp: , Rfl:   •  clobetasol (TEMOVATE) 0 05 % cream, Apply 1 application topically 2 (two) times a day  , Disp: , Rfl:   •  fluticasone (FLONASE) 50 mcg/act nasal spray, 2 sprays into each nostril daily, Disp: 48 mL, Rfl: 1  •  tadalafil (CIALIS) 20 MG tablet, Take 1 tablet (20 mg total) by mouth daily as needed for erectile dysfunction, Disp: 15 tablet, Rfl: 3  •  carbamide peroxide (DEBROX) 6 5 % otic solution, Administer 5 drops to the right ear 2 (two) times a day (Patient not taking: Reported on 3/14/2023), Disp: 15 mL, Rfl: 0  •  omeprazole (PriLOSEC) 20 mg delayed release capsule, TAKE 1 CAPSULE BY MOUTH EVERY DAY (Patient not taking: Reported on 3/14/2023), Disp: 90 capsule, Rfl: 1         Objective:         Vitals:    03/14/23 0859   BP: 140/80   BP Location: Left arm   Patient Position: Sitting   Cuff Size: Adult   Pulse: 70   Resp: 16   Temp: 97 8 °F (36 6 °C)   TempSrc: Temporal   SpO2: 94%   Weight: 94 3 kg (208 lb)   Height: 5' 10 87" (1 8 m)     Physical Exam  Vitals reviewed  Constitutional:       Appearance: He is well-developed  HENT:      Head: Normocephalic and atraumatic  Nose: Nose normal       Mouth/Throat:      Pharynx: No oropharyngeal exudate  Eyes:      General: No scleral icterus  Right eye: No discharge  Left eye: No discharge  Pupils: Pupils are equal, round, and reactive to light  Neck:      Trachea: No tracheal deviation     Cardiovascular: Rate and Rhythm: Normal rate and regular rhythm  Pulses:           Dorsalis pedis pulses are 2+ on the right side and 2+ on the left side  Posterior tibial pulses are 2+ on the right side and 2+ on the left side  Heart sounds: Normal heart sounds  No murmur heard  No friction rub  No gallop  Pulmonary:      Effort: Pulmonary effort is normal  No respiratory distress  Breath sounds: Normal breath sounds  No wheezing or rales  Abdominal:      General: Bowel sounds are normal  There is no distension  Palpations: Abdomen is soft  Tenderness: There is no abdominal tenderness  There is no guarding or rebound  Musculoskeletal:         General: Normal range of motion  Cervical back: Normal range of motion and neck supple  Lymphadenopathy:      Head:      Right side of head: No submental or submandibular adenopathy  Left side of head: No submental or submandibular adenopathy  Cervical: No cervical adenopathy  Right cervical: No superficial, deep or posterior cervical adenopathy  Left cervical: No superficial, deep or posterior cervical adenopathy  Skin:     General: Skin is warm and dry  Findings: No erythema  Neurological:      Mental Status: He is alert and oriented to person, place, and time  Cranial Nerves: No cranial nerve deficit  Sensory: No sensory deficit  Psychiatric:         Mood and Affect: Mood is not anxious or depressed  Speech: Speech normal          Behavior: Behavior normal          Thought Content:  Thought content normal          Judgment: Judgment normal

## 2023-05-25 ENCOUNTER — OFFICE VISIT (OUTPATIENT)
Dept: UROLOGY | Facility: MEDICAL CENTER | Age: 78
End: 2023-05-25

## 2023-05-25 VITALS
HEIGHT: 71 IN | OXYGEN SATURATION: 100 % | WEIGHT: 206 LBS | DIASTOLIC BLOOD PRESSURE: 64 MMHG | HEART RATE: 73 BPM | BODY MASS INDEX: 28.84 KG/M2 | SYSTOLIC BLOOD PRESSURE: 130 MMHG

## 2023-05-25 DIAGNOSIS — N40.1 BPH WITH URINARY OBSTRUCTION: Primary | ICD-10-CM

## 2023-05-25 DIAGNOSIS — N52.8 MIXED ERECTILE DYSFUNCTION: ICD-10-CM

## 2023-05-25 DIAGNOSIS — R35.0 URINARY FREQUENCY: ICD-10-CM

## 2023-05-25 DIAGNOSIS — N13.8 BPH WITH URINARY OBSTRUCTION: Primary | ICD-10-CM

## 2023-05-25 RX ORDER — TADALAFIL 20 MG/1
20 TABLET ORAL DAILY PRN
Qty: 20 TABLET | Refills: 3 | Status: SHIPPED | OUTPATIENT
Start: 2023-05-25

## 2023-05-25 NOTE — PROGRESS NOTES
"   HISTORY:    Follow-up BPH, urgency frequency, and ED    TURP 15 g in 2022, overall good stream and control  No incontinence    Still has nocturia x2, daytime every 2 hours, but overall quite pleased with his progress    Cialis 20 mg works okay         ASSESSMENT / PLAN:    Doing well  Recent PSA 0 3    Follow-up 2    The following portions of the patient's history were reviewed and updated as appropriate: allergies, current medications, past family history, past medical history, past social history, past surgical history and problem list     Review of Systems      Objective:     Physical Exam      0   Lab Value Date/Time    PSA 0 31 12/19/2022 0921    PSA 0 3 08/11/2021 1056    PSA 0 9 12/04/2020 0902    PSA 0 5 11/14/2017 1006   ]  BUN   Date Value Ref Range Status   12/19/2022 19 7 - 25 mg/dL Final     Creatinine   Date Value Ref Range Status   12/19/2022 0 85 0 70 - 1 28 mg/dL Final   02/07/2022 0 99 0 60 - 1 30 mg/dL Final     Comment:     Standardized to IDMS reference method   12/01/2016 0 92 0 70 - 1 25 mg/dL Final     Comment:     Result Comment: For patients >52years of age, the reference limit  for Creatinine is approximately 13% higher for people  identified as -American         No components found for: \"CBC\"      Patient Active Problem List   Diagnosis   • Lumbar stenosis with neurogenic claudication   • Vitamin D deficiency   • Eustachian tube dysfunction, right   • Claustrophobia   • Mixed erectile dysfunction   • Hyperlipidemia   • Insomnia   • Irritable bowel syndrome (IBS)   • Pre-diabetes   • Chronic rhinitis   • Thrombocytopenia (HCC)   • BPH with urinary obstruction   • Decreased libido   • Basal cell carcinoma of skin of left upper limb, including shoulder   • History of malignant neoplasm of skin   • Solar degeneration        Diagnoses and all orders for this visit:    BPH with urinary obstruction    Urinary frequency    Mixed erectile dysfunction  -     tadalafil (CIALIS) 20 MG " tablet; Take 1 tablet (20 mg total) by mouth daily as needed for erectile dysfunction    Other orders  -     mupirocin (BACTROBAN) 2 % ointment; APPLY 3 TIMES A DAY FOR 2 WEEKS TO AFFECTED AREA ON LEFT LOWER LEG           Patient ID: Lucho Monreal is a 78 y o  male        Current Outpatient Medications:   •  atorvastatin (LIPITOR) 20 mg tablet, Take 1 tablet (20 mg total) by mouth daily, Disp: 90 tablet, Rfl: 1  •  Cholecalciferol 125 MCG (5000 UT) capsule, Take 5,000 Units by mouth daily  , Disp: , Rfl:   •  clobetasol (TEMOVATE) 0 05 % cream, Apply 1 application topically 2 (two) times a day  , Disp: , Rfl:   •  fluticasone (FLONASE) 50 mcg/act nasal spray, 2 sprays into each nostril daily, Disp: 48 mL, Rfl: 1  •  mupirocin (BACTROBAN) 2 % ointment, APPLY 3 TIMES A DAY FOR 2 WEEKS TO AFFECTED AREA ON LEFT LOWER LEG, Disp: , Rfl:   •  omeprazole (PriLOSEC) 20 mg delayed release capsule, TAKE 1 CAPSULE BY MOUTH EVERY DAY (Patient taking differently: 20 mg if needed), Disp: 90 capsule, Rfl: 1  •  tadalafil (CIALIS) 20 MG tablet, Take 1 tablet (20 mg total) by mouth daily as needed for erectile dysfunction, Disp: 20 tablet, Rfl: 3  •  carbamide peroxide (DEBROX) 6 5 % otic solution, Administer 5 drops to the right ear 2 (two) times a day (Patient not taking: Reported on 3/14/2023), Disp: 15 mL, Rfl: 0    Past Medical History:   Diagnosis Date   • Allergic rhinitis due to pollen     last assessed: May 31, 2016   • Atypical pigmented skin lesion     last assessed: May 11, 2015   • Enlarged prostate    • GERD (gastroesophageal reflux disease)    • Hemorrhoids     last assessed: May 19, 2014   • Hyperlipidemia    • Malignant neoplasm of skin     BCCA   • Persistent dry cough     last assessed/resolved: July 21, 2015   • PONV (postoperative nausea and vomiting)    • Wears hearing aid     resolved: June 19, 2017       Past Surgical History:   Procedure Laterality Date   • BACK SURGERY     • CARPAL TUNNEL RELEASE Bilateral • CATARACT EXTRACTION Bilateral    • COLONOSCOPY     • HEMORRHOID SURGERY     • NEUROPLASTY / TRANSPOSITION MEDIAN NERVE AT CARPAL TUNNEL     • OTHER SURGICAL HISTORY      Basal Cell   • NE LAMNOTMY INCL W/DCMPRSN NRV ROOT 1 INTRSPC LUMBR N/A 6/28/2017    Procedure: L4/5 MINIMALLY INVASIVE BILATERAL LAMINOTOMY FROM A LEFT SIDED APPROACH AND MICRODISCECTOMY WITH POSSIBLE EPIDURAL STEROID INJECTION;  Surgeon: Jefry Friend MD;  Location: [facility];  Service: Neurosurgery   • NE TRURL ELECTROSURG RESCJ PROSTATE BLEED COMPLETE N/A 2/15/2022    Procedure: TURP;  Surgeon: Katy Contreras MD;  Location: [facility];  Service: Urology   • TONSILLECTOMY         Social History

## 2023-08-26 DIAGNOSIS — E78.5 HYPERLIPIDEMIA, UNSPECIFIED HYPERLIPIDEMIA TYPE: ICD-10-CM

## 2023-08-28 ENCOUNTER — TELEPHONE (OUTPATIENT)
Dept: FAMILY MEDICINE CLINIC | Facility: CLINIC | Age: 78
End: 2023-08-28

## 2023-08-28 RX ORDER — ATORVASTATIN CALCIUM 20 MG/1
20 TABLET, FILM COATED ORAL DAILY
Qty: 90 TABLET | Refills: 1 | Status: SHIPPED | OUTPATIENT
Start: 2023-08-28

## 2023-08-28 NOTE — TELEPHONE ENCOUNTER
Lvm returning pt's vm:    Hello, this is [facility]. My last name is spelled D as in Hailey Ratliff. First name Chasity Knott Date of birth [birth year]. I would like to make an appointment with my family physician Dr. Iftikhar Bower. I need a physical for elective surgery. Surgery with Doctor Mj Ferrell. Appreciate him calling me back. My number is [phone]. Thank you.

## 2023-09-14 ENCOUNTER — RA CDI HCC (OUTPATIENT)
Dept: OTHER | Facility: HOSPITAL | Age: 78
End: 2023-09-14

## 2023-09-14 NOTE — PROGRESS NOTES
[address] [facility] coding opportunities       Chart reviewed, no opportunity found: CHART REVIEWED, NO OPPORTUNITY FOUND     Patients Insurance     Medicare Insurance: [employer]

## 2023-09-24 DIAGNOSIS — K21.9 GASTROESOPHAGEAL REFLUX DISEASE WITHOUT ESOPHAGITIS: ICD-10-CM

## 2023-09-25 RX ORDER — OMEPRAZOLE 20 MG/1
CAPSULE, DELAYED RELEASE ORAL
Qty: 90 CAPSULE | Refills: 1 | Status: SHIPPED | OUTPATIENT
Start: 2023-09-25

## 2023-09-28 ENCOUNTER — OFFICE VISIT (OUTPATIENT)
Dept: FAMILY MEDICINE CLINIC | Facility: CLINIC | Age: 78
End: 2023-09-28
Payer: COMMERCIAL

## 2023-09-28 VITALS
BODY MASS INDEX: 28.78 KG/M2 | TEMPERATURE: 98.4 F | HEART RATE: 61 BPM | DIASTOLIC BLOOD PRESSURE: 76 MMHG | HEIGHT: 71 IN | OXYGEN SATURATION: 94 % | SYSTOLIC BLOOD PRESSURE: 128 MMHG | WEIGHT: 205.6 LBS

## 2023-09-28 DIAGNOSIS — H43.399 VITREOUS OPACITIES: ICD-10-CM

## 2023-09-28 DIAGNOSIS — K58.1 IRRITABLE BOWEL SYNDROME WITH CONSTIPATION: Primary | ICD-10-CM

## 2023-09-28 DIAGNOSIS — Z01.810 PREOP CARDIOVASCULAR EXAM: ICD-10-CM

## 2023-09-28 PROCEDURE — 99214 OFFICE O/P EST MOD 30 MIN: CPT | Performed by: FAMILY MEDICINE

## 2023-09-28 NOTE — PROGRESS NOTES
Assessment/Plan:   1. Preop cardiovascular exam/Vitreous opacities  Patient appears well today. He has a good functional capacity and no active cardiac problems. This type of procedure is considered low risk. He is cleared with low perioperative cardiovascular risk. No further testing needed today. 2. Irritable bowel syndrome with constipation  Patient symptoms have been persisting. At this time, he states that he has been treating this with a high-fiber diet however still has symptoms. We will start treatment with Linzess to further control this problem. Follow-up if any symptoms are worsening.  - linaCLOtide 72 MCG CAPS; Take 72 mcg by mouth daily at least 30 minutes prior to the first meal of the day  Dispense: 30 capsule; Refill: 2               There are no diagnoses linked to this encounter. Subjective:       Chief Complaint   Patient presents with   • Pre-op Exam     Floater removal 10/11 and 10/25      Patient ID: Abdoul Castro is a 70 y.o. male. Abdoul Castro  70 y.o.  male    SURGEON:Dr. Raghav Mccormick: Vitreous opacities    DATE OF SURGERY: 10/11/23 and 10/25/23    PRIOR ANESTHESIA:yes    COMPLICATION: no    BLEEDING PROBLEM: no    PERTINENT PMH: no    EXERCISE CAPACITY:   CAN WALK 4 BLOCKS AND OR CLIMB 2 FLIGHTS: Yes    HOME LIVING SITUATION SAFE AND SECURE: Yes      TOBACCO: no     ETOH: no     ILLEGAL DRUGS: no        Review of Systems   Constitutional: Negative for activity change, chills, fatigue and fever. HENT: Negative for congestion, ear pain, sinus pressure and sore throat. Eyes: Negative for redness, itching and visual disturbance. Respiratory: Negative for cough and shortness of breath. Cardiovascular: Negative for chest pain and palpitations. Gastrointestinal: Negative for abdominal pain, diarrhea and nausea. Endocrine: Negative for cold intolerance and heat intolerance. Genitourinary: Negative for dysuria, flank pain and frequency. Musculoskeletal: Negative for arthralgias, back pain, gait problem and myalgias. Skin: Negative for color change. Allergic/Immunologic: Negative for environmental allergies. Neurological: Negative for dizziness, numbness and headaches. Psychiatric/Behavioral: Negative for behavioral problems and sleep disturbance. The following portions of the patient's history were reviewed and updated as appropriate : past family history, past medical history, past social history and past surgical history. Current Outpatient Medications:   •  atorvastatin (LIPITOR) 20 mg tablet, TAKE 1 TABLET BY MOUTH EVERY DAY, Disp: 90 tablet, Rfl: 1  •  Cholecalciferol 125 MCG (5000 UT) capsule, Take 5,000 Units by mouth daily  , Disp: , Rfl:   •  clobetasol (TEMOVATE) 0.05 % cream, Apply 1 application topically 2 (two) times a day  , Disp: , Rfl:   •  fluticasone (FLONASE) 50 mcg/act nasal spray, 2 sprays into each nostril daily, Disp: 48 mL, Rfl: 1  •  omeprazole (PriLOSEC) 20 mg delayed release capsule, TAKE 1 CAPSULE BY MOUTH EVERY DAY, Disp: 90 capsule, Rfl: 1  •  tadalafil (CIALIS) 20 MG tablet, Take 1 tablet (20 mg total) by mouth daily as needed for erectile dysfunction, Disp: 20 tablet, Rfl: 3  •  carbamide peroxide (DEBROX) 6.5 % otic solution, Administer 5 drops to the right ear 2 (two) times a day (Patient not taking: Reported on 3/14/2023), Disp: 15 mL, Rfl: 0  •  mupirocin (BACTROBAN) 2 % ointment, APPLY 3 TIMES A DAY FOR 2 WEEKS TO AFFECTED AREA ON LEFT LOWER LEG (Patient not taking: Reported on 9/28/2023), Disp: , Rfl:          Objective:         Vitals:    09/28/23 1037   BP: 128/76   BP Location: Left arm   Patient Position: Sitting   Cuff Size: Large   Pulse: 61   Temp: 98.4 °F (36.9 °C)   SpO2: 94%   Weight: 93.3 kg (205 lb 9.6 oz)   Height: 5' 11" (1.803 m)     Physical Exam  Vitals reviewed. Constitutional:       Appearance: He is well-developed. HENT:      Head: Normocephalic and atraumatic. Nose: Nose normal.      Mouth/Throat:      Pharynx: No oropharyngeal exudate. Eyes:      General: No scleral icterus. Right eye: No discharge. Left eye: No discharge. Pupils: Pupils are equal, round, and reactive to light. Neck:      Trachea: No tracheal deviation. Cardiovascular:      Rate and Rhythm: Normal rate and regular rhythm. Pulses:           Dorsalis pedis pulses are 2+ on the right side and 2+ on the left side. Posterior tibial pulses are 2+ on the right side and 2+ on the left side. Heart sounds: Normal heart sounds. No murmur heard. No friction rub. No gallop. Pulmonary:      Effort: Pulmonary effort is normal. No respiratory distress. Breath sounds: Normal breath sounds. No wheezing or rales. Abdominal:      General: Bowel sounds are normal. There is no distension. Palpations: Abdomen is soft. Tenderness: There is no abdominal tenderness. There is no guarding or rebound. Musculoskeletal:         General: Normal range of motion. Cervical back: Normal range of motion and neck supple. Lymphadenopathy:      Head:      Right side of head: No submental or submandibular adenopathy. Left side of head: No submental or submandibular adenopathy. Cervical: No cervical adenopathy. Right cervical: No superficial, deep or posterior cervical adenopathy. Left cervical: No superficial, deep or posterior cervical adenopathy. Skin:     General: Skin is warm and dry. Findings: No erythema. Neurological:      Mental Status: He is alert and oriented to person, place, and time. Cranial Nerves: No cranial nerve deficit. Sensory: No sensory deficit. Psychiatric:         Mood and Affect: Mood is not anxious or depressed. Speech: Speech normal.         Behavior: Behavior normal.         Thought Content:  Thought content normal.         Judgment: Judgment normal.

## 2023-10-02 ENCOUNTER — IMMUNIZATIONS (OUTPATIENT)
Dept: FAMILY MEDICINE CLINIC | Facility: CLINIC | Age: 78
End: 2023-10-02
Payer: COMMERCIAL

## 2023-10-02 DIAGNOSIS — Z23 ENCOUNTER FOR IMMUNIZATION: Primary | ICD-10-CM

## 2023-10-02 PROCEDURE — 90662 IIV NO PRSV INCREASED AG IM: CPT

## 2023-10-02 PROCEDURE — G0008 ADMIN INFLUENZA VIRUS VAC: HCPCS

## 2023-11-25 DIAGNOSIS — J31.0 CHRONIC RHINITIS: ICD-10-CM

## 2023-11-27 RX ORDER — FLUTICASONE PROPIONATE 50 MCG
SPRAY, SUSPENSION (ML) NASAL
Qty: 48 ML | Refills: 1 | Status: SHIPPED | OUTPATIENT
Start: 2023-11-27

## 2024-01-09 LAB
ALBUMIN SERPL-MCNC: 4.5 G/DL (ref 3.6–5.1)
ALBUMIN/GLOB SERPL: 1.7 (CALC) (ref 1–2.5)
ALP SERPL-CCNC: 48 U/L (ref 35–144)
ALT SERPL-CCNC: 19 U/L (ref 9–46)
AST SERPL-CCNC: 16 U/L (ref 10–35)
BASOPHILS # BLD AUTO: 30 CELLS/UL (ref 0–200)
BASOPHILS NFR BLD AUTO: 0.5 %
BILIRUB SERPL-MCNC: 0.8 MG/DL (ref 0.2–1.2)
BUN SERPL-MCNC: 20 MG/DL (ref 7–25)
BUN/CREAT SERPL: ABNORMAL (CALC) (ref 6–22)
CALCIUM SERPL-MCNC: 9.6 MG/DL (ref 8.6–10.3)
CHLORIDE SERPL-SCNC: 103 MMOL/L (ref 98–110)
CHOLEST SERPL-MCNC: 166 MG/DL
CHOLEST/HDLC SERPL: 4 (CALC)
CO2 SERPL-SCNC: 30 MMOL/L (ref 20–32)
CREAT SERPL-MCNC: 0.78 MG/DL (ref 0.7–1.28)
EOSINOPHIL # BLD AUTO: 180 CELLS/UL (ref 15–500)
EOSINOPHIL NFR BLD AUTO: 3 %
ERYTHROCYTE [DISTWIDTH] IN BLOOD BY AUTOMATED COUNT: 13.2 % (ref 11–15)
GFR/BSA.PRED SERPLBLD CYS-BASED-ARV: 92 ML/MIN/1.73M2
GLOBULIN SER CALC-MCNC: 2.6 G/DL (CALC) (ref 1.9–3.7)
GLUCOSE SERPL-MCNC: 106 MG/DL (ref 65–99)
HCT VFR BLD AUTO: 42.3 % (ref 38.5–50)
HDLC SERPL-MCNC: 42 MG/DL
HGB BLD-MCNC: 14.3 G/DL (ref 13.2–17.1)
LDLC SERPL CALC-MCNC: 100 MG/DL (CALC)
LYMPHOCYTES # BLD AUTO: 1956 CELLS/UL (ref 850–3900)
LYMPHOCYTES NFR BLD AUTO: 32.6 %
MCH RBC QN AUTO: 31.4 PG (ref 27–33)
MCHC RBC AUTO-ENTMCNC: 33.8 G/DL (ref 32–36)
MCV RBC AUTO: 92.8 FL (ref 80–100)
MONOCYTES # BLD AUTO: 480 CELLS/UL (ref 200–950)
MONOCYTES NFR BLD AUTO: 8 %
NEUTROPHILS # BLD AUTO: 3354 CELLS/UL (ref 1500–7800)
NEUTROPHILS NFR BLD AUTO: 55.9 %
NONHDLC SERPL-MCNC: 124 MG/DL (CALC)
PLATELET # BLD AUTO: 165 THOUSAND/UL (ref 140–400)
PMV BLD REES-ECKER: 11.8 FL (ref 7.5–12.5)
POTASSIUM SERPL-SCNC: 4.2 MMOL/L (ref 3.5–5.3)
PROT SERPL-MCNC: 7.1 G/DL (ref 6.1–8.1)
PSA SERPL-MCNC: 0.33 NG/ML
RBC # BLD AUTO: 4.56 MILLION/UL (ref 4.2–5.8)
SODIUM SERPL-SCNC: 140 MMOL/L (ref 135–146)
TRIGL SERPL-MCNC: 137 MG/DL
WBC # BLD AUTO: 6 THOUSAND/UL (ref 3.8–10.8)

## 2024-02-08 ENCOUNTER — OFFICE VISIT (OUTPATIENT)
Dept: FAMILY MEDICINE CLINIC | Facility: CLINIC | Age: 79
End: 2024-02-08
Payer: COMMERCIAL

## 2024-02-08 VITALS
OXYGEN SATURATION: 98 % | HEIGHT: 71 IN | HEART RATE: 66 BPM | WEIGHT: 207.6 LBS | TEMPERATURE: 97.3 F | DIASTOLIC BLOOD PRESSURE: 74 MMHG | SYSTOLIC BLOOD PRESSURE: 120 MMHG | BODY MASS INDEX: 29.06 KG/M2

## 2024-02-08 DIAGNOSIS — R73.03 PRE-DIABETES: ICD-10-CM

## 2024-02-08 DIAGNOSIS — E78.5 HYPERLIPIDEMIA, UNSPECIFIED HYPERLIPIDEMIA TYPE: ICD-10-CM

## 2024-02-08 DIAGNOSIS — Z00.00 MEDICARE ANNUAL WELLNESS VISIT, SUBSEQUENT: Primary | ICD-10-CM

## 2024-02-08 DIAGNOSIS — D69.6 THROMBOCYTOPENIA (HCC): ICD-10-CM

## 2024-02-08 DIAGNOSIS — K58.1 IRRITABLE BOWEL SYNDROME WITH CONSTIPATION: ICD-10-CM

## 2024-02-08 PROCEDURE — G0439 PPPS, SUBSEQ VISIT: HCPCS | Performed by: FAMILY MEDICINE

## 2024-02-08 PROCEDURE — 99213 OFFICE O/P EST LOW 20 MIN: CPT | Performed by: FAMILY MEDICINE

## 2024-02-08 RX ORDER — ATORVASTATIN CALCIUM 20 MG/1
20 TABLET, FILM COATED ORAL DAILY
Qty: 90 TABLET | Refills: 1 | Status: SHIPPED | OUTPATIENT
Start: 2024-02-08

## 2024-02-08 RX ORDER — BUTYROSPERMUM PARKII(SHEA BUTTER), SIMMONDSIA CHINENSIS (JOJOBA) SEED OIL, ALOE BARBADENSIS LEAF EXTRACT .01; 1; 3.5 G/100G; G/100G; G/100G
100 LIQUID TOPICAL DAILY
Qty: 30 CAPSULE | Refills: 0 | Status: SHIPPED | OUTPATIENT
Start: 2024-02-08

## 2024-02-08 NOTE — PATIENT INSTRUCTIONS
Medicare Preventive Visit Patient Instructions  Thank you for completing your Welcome to Medicare Visit or Medicare Annual Wellness Visit today. Your next wellness visit will be due in one year (2/8/2025).  The screening/preventive services that you may require over the next 5-10 years are detailed below. Some tests may not apply to you based off risk factors and/or age. Screening tests ordered at today's visit but not completed yet may show as past due. Also, please note that scanned in results may not display below.  Preventive Screenings:  Service Recommendations Previous Testing/Comments   Colorectal Cancer Screening  Colonoscopy    Fecal Occult Blood Test (FOBT)/Fecal Immunochemical Test (FIT)  Fecal DNA/Cologuard Test  Flexible Sigmoidoscopy Age: 45-75 years old   Colonoscopy: every 10 years (May be performed more frequently if at higher risk)  OR  FOBT/FIT: every 1 year  OR  Cologuard: every 3 years  OR  Sigmoidoscopy: every 5 years  Screening may be recommended earlier than age 45 if at higher risk for colorectal cancer. Also, an individualized decision between you and your healthcare provider will decide whether screening between the ages of 76-85 would be appropriate. Colonoscopy: 07/16/2019  FOBT/FIT: Not on file  Cologuard: Not on file  Sigmoidoscopy: Not on file    Screening Current     Prostate Cancer Screening Individualized decision between patient and health care provider in men between ages of 55-69   Medicare will cover every 12 months beginning on the day after your 50th birthday PSA: 0.33 ng/mL     Screening Not Indicated     Hepatitis C Screening Once for adults born between 1945 and 1965  More frequently in patients at high risk for Hepatitis C Hep C Antibody: 05/23/2019    Screening Current   Diabetes Screening 1-2 times per year if you're at risk for diabetes or have pre-diabetes Fasting glucose: No results in last 5 years (No results in last 5 years)  A1C: 5.5 % of total Hgb  (12/16/2021)  Screening Current   Cholesterol Screening Once every 5 years if you don't have a lipid disorder. May order more often based on risk factors. Lipid panel: 01/09/2024  Screening Not Indicated  History Lipid Disorder      Other Preventive Screenings Covered by Medicare:  Abdominal Aortic Aneurysm (AAA) Screening: covered once if your at risk. You're considered to be at risk if you have a family history of AAA or a male between the age of 65-75 who smoking at least 100 cigarettes in your lifetime.  Lung Cancer Screening: covers low dose CT scan once per year if you meet all of the following conditions: (1) Age 55-77; (2) No signs or symptoms of lung cancer; (3) Current smoker or have quit smoking within the last 15 years; (4) You have a tobacco smoking history of at least 20 pack years (packs per day x number of years you smoked); (5) You get a written order from a healthcare provider.  Glaucoma Screening: covered annually if you're considered high risk: (1) You have diabetes OR (2) Family history of glaucoma OR (3)  aged 50 and older OR (4)  American aged 65 and older  Osteoporosis Screening: covered every 2 years if you meet one of the following conditions: (1) Have a vertebral abnormality; (2) On glucocorticoid therapy for more than 3 months; (3) Have primary hyperparathyroidism; (4) On osteoporosis medications and need to assess response to drug therapy.  HIV Screening: covered annually if you're between the age of 15-65. Also covered annually if you are younger than 15 and older than 65 with risk factors for HIV infection. For pregnant patients, it is covered up to 3 times per pregnancy.    Immunizations:  Immunization Recommendations   Influenza Vaccine Annual influenza vaccination during flu season is recommended for all persons aged >= 6 months who do not have contraindications   Pneumococcal Vaccine   * Pneumococcal conjugate vaccine = PCV13 (Prevnar 13), PCV15  (Vaxneuvance), PCV20 (Prevnar 20)  * Pneumococcal polysaccharide vaccine = PPSV23 (Pneumovax) Adults 19-63 yo with certain risk factors or if 65+ yo  If never received any pneumonia vaccine: recommend Prevnar 20 (PCV20)  Give PCV20 if previously received 1 dose of PCV13 or PPSV23   Hepatitis B Vaccine 3 dose series if at intermediate or high risk (ex: diabetes, end stage renal disease, liver disease)   Respiratory syncytial virus (RSV) Vaccine - COVERED BY MEDICARE PART D  * RSVPreF3 (Arexvy) CDC recommends that adults 60 years of age and older may receive a single dose of RSV vaccine using shared clinical decision-making (SCDM)   Tetanus (Td) Vaccine - COST NOT COVERED BY MEDICARE PART B Following completion of primary series, a booster dose should be given every 10 years to maintain immunity against tetanus. Td may also be given as tetanus wound prophylaxis.   Tdap Vaccine - COST NOT COVERED BY MEDICARE PART B Recommended at least once for all adults. For pregnant patients, recommended with each pregnancy.   Shingles Vaccine (Shingrix) - COST NOT COVERED BY MEDICARE PART B  2 shot series recommended in those 19 years and older who have or will have weakened immune systems or those 50 years and older     Health Maintenance Due:      Topic Date Due   • Colorectal Cancer Screening  07/16/2024   • Hepatitis C Screening  Completed     Immunizations Due:      Topic Date Due   • COVID-19 Vaccine (4 - 2023-24 season) 09/01/2023     Advance Directives   What are advance directives?  Advance directives are legal documents that state your wishes and plans for medical care. These plans are made ahead of time in case you lose your ability to make decisions for yourself. Advance directives can apply to any medical decision, such as the treatments you want, and if you want to donate organs.   What are the types of advance directives?  There are many types of advance directives, and each state has rules about how to use them. You  may choose a combination of any of the following:  Living will:  This is a written record of the treatment you want. You can also choose which treatments you do not want, which to limit, and which to stop at a certain time. This includes surgery, medicine, IV fluid, and tube feedings.   Durable power of  for healthcare (DPAHC):  This is a written record that states who you want to make healthcare choices for you when you are unable to make them for yourself. This person, called a proxy, is usually a family member or a friend. You may choose more than 1 proxy.  Do not resuscitate (DNR) order:  A DNR order is used in case your heart stops beating or you stop breathing. It is a request not to have certain forms of treatment, such as CPR. A DNR order may be included in other types of advance directives.  Medical directive:  This covers the care that you want if you are in a coma, near death, or unable to make decisions for yourself. You can list the treatments you want for each condition. Treatment may include pain medicine, surgery, blood transfusions, dialysis, IV or tube feedings, and a ventilator (breathing machine).  Values history:  This document has questions about your views, beliefs, and how you feel and think about life. This information can help others choose the care that you would choose.  Why are advance directives important?  An advance directive helps you control your care. Although spoken wishes may be used, it is better to have your wishes written down. Spoken wishes can be misunderstood, or not followed. Treatments may be given even if you do not want them. An advance directive may make it easier for your family to make difficult choices about your care.   Weight Management   Why it is important to manage your weight:  Being overweight increases your risk of health conditions such as heart disease, high blood pressure, type 2 diabetes, and certain types of cancer. It can also increase your  risk for osteoarthritis, sleep apnea, and other respiratory problems. Aim for a slow, steady weight loss. Even a small amount of weight loss can lower your risk of health problems.  How to lose weight safely:  A safe and healthy way to lose weight is to eat fewer calories and get regular exercise. You can lose up about 1 pound a week by decreasing the number of calories you eat by 500 calories each day.   Healthy meal plan for weight management:  A healthy meal plan includes a variety of foods, contains fewer calories, and helps you stay healthy. A healthy meal plan includes the following:  Eat whole-grain foods more often.  A healthy meal plan should contain fiber. Fiber is the part of grains, fruits, and vegetables that is not broken down by your body. Whole-grain foods are healthy and provide extra fiber in your diet. Some examples of whole-grain foods are whole-wheat breads and pastas, oatmeal, brown rice, and bulgur.  Eat a variety of vegetables every day.  Include dark, leafy greens such as spinach, kale, reggie greens, and mustard greens. Eat yellow and orange vegetables such as carrots, sweet potatoes, and winter squash.   Eat a variety of fruits every day.  Choose fresh or canned fruit (canned in its own juice or light syrup) instead of juice. Fruit juice has very little or no fiber.  Eat low-fat dairy foods.  Drink fat-free (skim) milk or 1% milk. Eat fat-free yogurt and low-fat cottage cheese. Try low-fat cheeses such as mozzarella and other reduced-fat cheeses.  Choose meat and other protein foods that are low in fat.  Choose beans or other legumes such as split peas or lentils. Choose fish, skinless poultry (chicken or turkey), or lean cuts of red meat (beef or pork). Before you cook meat or poultry, cut off any visible fat.   Use less fat and oil.  Try baking foods instead of frying them. Add less fat, such as margarine, sour cream, regular salad dressing and mayonnaise to foods. Eat fewer high-fat  foods. Some examples of high-fat foods include french fries, doughnuts, ice cream, and cakes.  Eat fewer sweets.  Limit foods and drinks that are high in sugar. This includes candy, cookies, regular soda, and sweetened drinks.  Exercise:  Exercise at least 30 minutes per day on most days of the week. Some examples of exercise include walking, biking, dancing, and swimming. You can also fit in more physical activity by taking the stairs instead of the elevator or parking farther away from stores. Ask your healthcare provider about the best exercise plan for you.      © Copyright [employer] 2018 Information is for End User's use only and may not be sold, redistributed or otherwise used for commercial purposes. All illustrations and images included in CareNotes® are the copyrighted property of [employer] or [employer]

## 2024-02-08 NOTE — PROGRESS NOTES
Assessment and Plan:   1. Hyperlipidemia, unspecified hyperlipidemia type  Lipid panel appears very well-controlled.  Continue with a strict low-fat/low-cholesterol as well as his current treatment with atorvastatin.  Recheck blood work in 6 months.  - atorvastatin (LIPITOR) 20 mg tablet; Take 1 tablet (20 mg total) by mouth daily  Dispense: 90 tablet; Refill: 1  - Comprehensive metabolic panel; Future  - Lipid Panel with Direct LDL reflex; Future  - Comprehensive metabolic panel  - Lipid Panel with Direct LDL reflex    2. Irritable bowel syndrome with constipation  Stable.  At this time, patient does not appear to have any emergent symptoms.  He has tried Linzess in the past with minimal relief.  At this time, he is advised the importance of maintaining proper bowel health.  Maintain hydration.  Continue with his fiber supplementation.  He may benefit from taking magnesium.  Follow-up if any symptoms worsen.  - Magnesium Citrate 100 MG CAPS; Take 100 mg by mouth in the morning  Dispense: 30 capsule; Refill: 0      Problem List Items Addressed This Visit          Digestive    Irritable bowel syndrome (IBS)    Relevant Medications    Magnesium Citrate 100 MG CAPS       Hematopoietic and Hemostatic    Thrombocytopenia (HCC)    Relevant Orders    CBC       Other    Hyperlipidemia    Relevant Medications    atorvastatin (LIPITOR) 20 mg tablet    Other Relevant Orders    Comprehensive metabolic panel    Lipid Panel with Direct LDL reflex    Pre-diabetes    Relevant Orders    Hemoglobin A1C With EAG     Other Visit Diagnoses       Medicare annual wellness visit, subsequent    -  Primary             Preventive health issues were discussed with patient, and age appropriate screening tests were ordered as noted in patient's After Visit Summary.  Personalized health advice and appropriate referrals for health education or preventive services given if needed, as noted in patient's After Visit Summary.     History of Present  Illness:     Patient presents for a Medicare Wellness Visit as well as a follow-up on his chronic condition.  He has dyslipidemia, IBS with constipation.  He is been taking his medications regularly.  He denies adverse reactions with his medications.    HPI   Patient Care Team:  Airam Nguyen DO as PCP - General  Airam Nguyen DO as PCP - PCP-[facility] Mya-DO Cristian Bryan MD     Review of Systems:     Review of Systems   Constitutional:  Negative for activity change, chills, fatigue and fever.   HENT:  Negative for congestion, ear pain, sinus pressure and sore throat.    Eyes:  Negative for redness, itching and visual disturbance.   Respiratory:  Negative for cough and shortness of breath.    Cardiovascular:  Negative for chest pain and palpitations.   Gastrointestinal:  Negative for abdominal pain, diarrhea and nausea.   Endocrine: Negative for cold intolerance and heat intolerance.   Genitourinary:  Negative for dysuria, flank pain and frequency.   Musculoskeletal:  Negative for arthralgias, back pain, gait problem and myalgias.   Skin:  Negative for color change.   Allergic/Immunologic: Negative for environmental allergies.   Neurological:  Negative for dizziness, numbness and headaches.   Psychiatric/Behavioral:  Negative for behavioral problems and sleep disturbance.         Problem List:     Patient Active Problem List   Diagnosis    Lumbar stenosis with neurogenic claudication    Vitamin D deficiency    Eustachian tube dysfunction, right    Claustrophobia    Mixed erectile dysfunction    Hyperlipidemia    Insomnia    Irritable bowel syndrome (IBS)    Pre-diabetes    Chronic rhinitis    Thrombocytopenia (HCC)    BPH with urinary obstruction    Decreased libido    Basal cell carcinoma of skin of left upper limb, including shoulder    History of malignant neoplasm of skin    Solar degeneration      Past Medical and Surgical History:     Past Medical History:   Diagnosis  Date    Allergic rhinitis due to pollen     last assessed: May 31, 2016    Atypical pigmented skin lesion     last assessed: May 11, 2015    Enlarged prostate     GERD (gastroesophageal reflux disease)     Hemorrhoids     last assessed: May 19, 2014    Hyperlipidemia     Malignant neoplasm of skin     BCCA    Persistent dry cough     last assessed/resolved: July 21, 2015    PONV (postoperative nausea and vomiting)     Wears hearing aid     resolved: June 19, 2017     Past Surgical History:   Procedure Laterality Date    BACK SURGERY      CARPAL TUNNEL RELEASE Bilateral     CATARACT EXTRACTION Bilateral     COLONOSCOPY      HEMORRHOID SURGERY      NEUROPLASTY / TRANSPOSITION MEDIAN NERVE AT CARPAL TUNNEL      OTHER SURGICAL HISTORY      Basal Cell    NC LAMNOTMY INCL W/DCMPRSN NRV ROOT 1 INTRSPC LUMBR N/A 6/28/2017    Procedure: L4/5 MINIMALLY INVASIVE BILATERAL LAMINOTOMY FROM A LEFT SIDED APPROACH AND MICRODISCECTOMY WITH POSSIBLE EPIDURAL STEROID INJECTION;  Surgeon: Cristian Kwok MD;  Location: [facility];  Service: Neurosurgery    NC TRURL ELECTROSURG RESCJ PROSTATE BLEED COMPLETE N/A 2/15/2022    Procedure: TURP;  Surgeon: Stephen Ornelas MD;  Location: [facility];  Service: Urology    TONSILLECTOMY        Family History:     Family History   Problem Relation Age of Onset    Anemia Mother     Parkinsonism Father     Anemia Family     Heart disease Family     Other Family         current diet is high in cholesterol     Alcohol abuse Neg Hx     Substance Abuse Neg Hx     Mental illness Neg Hx     Depression Neg Hx       Social History:     Social History     Socioeconomic History    Marital status: /Civil Union     Spouse name: None    Number of children: 1    Years of education: completed graduate school, Master's Degree     Highest education level: None   Occupational History    Occupation: Retired    Tobacco Use    Smoking status: Former     Current packs/day: 0.00     Types: Cigarettes     Start date:  1964     Quit date:      Years since quittin.1    Smokeless tobacco: Never   Vaping Use    Vaping status: Never Used   Substance and Sexual Activity    Alcohol use: Yes     Alcohol/week: 7.0 standard drinks of alcohol     Types: 7 Shots of liquor per week     Comment: before dinner - Social     Drug use: No    Sexual activity: Yes     Partners: Female   Other Topics Concern    None   Social History Narrative    CONSUMES 1 CUP OF COFFEE PER DAY            Lives with spouse      Social Determinants of Health     Financial Resource Strain: Low Risk  (2024)    Overall Financial Resource Strain (CARDIA)     Difficulty of Paying Living Expenses: Not hard at all   Food Insecurity: Not on file   Transportation Needs: No Transportation Needs (2024)    PRAPARE - Transportation     Lack of Transportation (Medical): No     Lack of Transportation (Non-Medical): No   Physical Activity: Not on file   Stress: Not on file   Social Connections: Not on file   Intimate Partner Violence: Not on file   Housing Stability: Not on file      Medications and Allergies:     Current Outpatient Medications   Medication Sig Dispense Refill    atorvastatin (LIPITOR) 20 mg tablet Take 1 tablet (20 mg total) by mouth daily 90 tablet 1    Cholecalciferol 125 MCG (5000 UT) capsule Take 5,000 Units by mouth daily        clobetasol (TEMOVATE) 0.05 % cream Apply 1 application topically 2 (two) times a day        fluticasone (FLONASE) 50 mcg/act nasal spray SPRAY 2 SPRAYS INTO EACH NOSTRIL EVERY DAY 48 mL 1    Magnesium Citrate 100 MG CAPS Take 100 mg by mouth in the morning 30 capsule 0    mupirocin (BACTROBAN) 2 % ointment       omeprazole (PriLOSEC) 20 mg delayed release capsule TAKE 1 CAPSULE BY MOUTH EVERY DAY 90 capsule 1    tadalafil (CIALIS) 20 MG tablet Take 1 tablet (20 mg total) by mouth daily as needed for erectile dysfunction 20 tablet 3     No current facility-administered medications for this visit.     Allergies   Allergen  Reactions    Latex Rash     Seems Ok with paper tape but patient thinks allergic to both latex and adhesive    Other Rash     Adhesive tapes      Immunizations:     Immunization History   Administered Date(s) Administered    COVID-19 MODERNA VACC 0.5 ML IM 03/23/2021, 04/22/2021, 01/04/2022    H1N1, All Formulations 03/23/2010    INFLUENZA 11/10/2014, 11/23/2015, 10/01/2016, 10/07/2022    Influenza Split High Dose Preservative Free IM 11/10/2014, 11/23/2015, 10/01/2016, 09/15/2017    Influenza, high dose seasonal 0.7 mL 10/10/2018, 10/08/2019, 10/13/2020, 10/05/2021, 10/07/2022, 10/02/2023    Influenza, seasonal, injectable 10/15/2012, 11/06/2013    Pneumococcal Conjugate 13-Valent 11/23/2015    Pneumococcal Polysaccharide PPV23 10/23/2012    Zoster Vaccine Recombinant 08/18/2023, 11/13/2023      Health Maintenance:         Topic Date Due    Colorectal Cancer Screening  07/16/2024    Hepatitis C Screening  Completed         Topic Date Due    COVID-19 Vaccine (4 - 2023-24 season) 09/01/2023      Medicare Screening Tests and Risk Assessments:     Augustine is here for his Subsequent Wellness visit. Last Medicare Wellness visit information reviewed, patient interviewed, no change since last AWV.     Health Risk Assessment:   Patient rates overall health as very good. Patient feels that their physical health rating is same. Patient is very satisfied with their life. Eyesight was rated as same. Hearing was rated as same. Patient feels that their emotional and mental health rating is same. Patients states they are sometimes angry. Patient states they are never, rarely unusually tired/fatigued. Pain experienced in the last 7 days has been none. Patient states that he has experienced no weight loss or gain in last 6 months.     Depression Screening:   PHQ-2 Score: 0      Fall Risk Screening:   In the past year, patient has experienced: no history of falling in past year      Home Safety:  Patient does not have trouble with  stairs inside or outside of their home. Patient has working smoke alarms and has no working carbon monoxide detector. Home safety hazards include: none.     Nutrition:   Current diet is Regular.     Medications:   Patient is currently taking over-the-counter supplements. OTC medications include: see medication list. Patient is able to manage medications.     Activities of Daily Living (ADLs)/Instrumental Activities of Daily Living (IADLs):   Walk and transfer into and out of bed and chair?: Yes  Dress and groom yourself?: Yes    Bathe or shower yourself?: Yes    Feed yourself? Yes  Do your laundry/housekeeping?: Yes  Manage your money, pay your bills and track your expenses?: Yes  Make your own meals?: Yes    Do your own shopping?: Yes    Previous Hospitalizations:   Any hospitalizations or ED visits within the last 12 months?: No      Advance Care Planning:   Living will: Yes    Durable POA for healthcare: Yes    Advanced directive: Yes    Advanced directive counseling given: Yes    ACP document given: Yes    Patient declined ACP directive: No    End of Life Decisions reviewed with patient: Yes    Provider agrees with end of life decisions: Yes      Cognitive Screening:   Provider or family/friend/caregiver concerned regarding cognition?: No    PREVENTIVE SCREENINGS      Cardiovascular Screening:    General: Screening Not Indicated, History Lipid Disorder, Risks and Benefits Discussed and Screening Current      Diabetes Screening:     General: Screening Current and Risks and Benefits Discussed      Colorectal Cancer Screening:     General: Screening Current      Prostate Cancer Screening:    General: Screening Not Indicated, Risks and Benefits Discussed and Screening Current      Osteoporosis Screening:    General: Risks and Benefits Discussed and Screening Current      Abdominal Aortic Aneurysm (AAA) Screening:    Risk factors include: tobacco use        General: Risks and Benefits Discussed and Screening Not  "Indicated      Lung Cancer Screening:     General: Screening Not Indicated and Risks and Benefits Discussed      Hepatitis C Screening:    General: Screening Current and Risks and Benefits Discussed    Screening, Brief Intervention, and Referral to Treatment (SBIRT)    Screening  Typical number of drinks in a day: 1  Typical number of drinks in a week: 3  Interpretation: Low risk drinking behavior.    AUDIT-C Screenin) How often did you have a drink containing alcohol in the past year? 2 to 3 times a week  2) How many drinks did you have on a typical day when you were drinking in the past year? 1 to 2    Single Item Drug Screening:  How often have you used an illegal drug (including marijuana) or a prescription medication for non-medical reasons in the past year? never    Single Item Drug Screen Score: 0  Interpretation: Negative screen for possible drug use disorder    No results found.     Physical Exam:     /74 (BP Location: Right arm, Patient Position: Sitting, Cuff Size: Large)   Pulse 66   Temp (!) 97.3 °F (36.3 °C) (Temporal)   Ht 5' 11\" (1.803 m)   Wt 94.2 kg (207 lb 9.6 oz)   SpO2 98%   BMI 28.95 kg/m²     Physical Exam  Vitals reviewed.   Constitutional:       General: He is not in acute distress.     Appearance: He is well-developed. He is not diaphoretic.   HENT:      Head: Normocephalic and atraumatic. No right periorbital erythema or left periorbital erythema.      Right Ear: Tympanic membrane normal. No decreased hearing noted.      Left Ear: Tympanic membrane normal. No decreased hearing noted.      Nose: Nose normal.      Right Sinus: No maxillary sinus tenderness or frontal sinus tenderness.      Left Sinus: No maxillary sinus tenderness or frontal sinus tenderness.      Mouth/Throat:      Lips: Pink.      Mouth: Mucous membranes are moist.      Pharynx: No oropharyngeal exudate.      Tonsils: No tonsillar exudate or tonsillar abscesses.   Eyes:      General: Lids are normal.      " Extraocular Movements: Extraocular movements intact.      Conjunctiva/sclera: Conjunctivae normal.      Pupils: Pupils are equal, round, and reactive to light.   Neck:      Thyroid: No thyroid mass.      Trachea: Trachea normal.   Cardiovascular:      Rate and Rhythm: Normal rate and regular rhythm.      Pulses: Normal pulses.      Heart sounds: Normal heart sounds. No murmur heard.     No friction rub. No gallop.   Pulmonary:      Effort: Pulmonary effort is normal. No respiratory distress.      Breath sounds: Normal breath sounds. No wheezing or rales.   Abdominal:      General: Bowel sounds are normal. There is no distension.      Palpations: Abdomen is soft. There is no mass.      Tenderness: There is no abdominal tenderness. There is no guarding.   Musculoskeletal:         General: Normal range of motion.      Cervical back: Normal range of motion and neck supple.   Skin:     General: Skin is warm and dry.      Coloration: Skin is not pale.      Findings: No erythema or rash.   Neurological:      Mental Status: He is alert and oriented to person, place, and time.      Cranial Nerves: No cranial nerve deficit.      Coordination: Coordination normal.   Psychiatric:         Attention and Perception: Attention and perception normal.         Mood and Affect: Mood and affect normal.         Speech: Speech normal.         Behavior: Behavior normal.         Thought Content: Thought content normal.         Cognition and Memory: Cognition and memory normal.         Judgment: Judgment normal.          Airam Nguyen, DO

## 2024-03-13 ENCOUNTER — OFFICE VISIT (OUTPATIENT)
Dept: FAMILY MEDICINE CLINIC | Facility: CLINIC | Age: 79
End: 2024-03-13
Payer: COMMERCIAL

## 2024-03-13 VITALS
SYSTOLIC BLOOD PRESSURE: 138 MMHG | WEIGHT: 208.2 LBS | DIASTOLIC BLOOD PRESSURE: 80 MMHG | HEART RATE: 65 BPM | TEMPERATURE: 97.5 F | OXYGEN SATURATION: 99 % | BODY MASS INDEX: 29.15 KG/M2 | HEIGHT: 71 IN

## 2024-03-13 DIAGNOSIS — H61.23 BILATERAL IMPACTED CERUMEN: Primary | ICD-10-CM

## 2024-03-13 DIAGNOSIS — R42 DIZZINESSES: ICD-10-CM

## 2024-03-13 PROCEDURE — 99214 OFFICE O/P EST MOD 30 MIN: CPT | Performed by: FAMILY MEDICINE

## 2024-03-13 PROCEDURE — 69209 REMOVE IMPACTED EAR WAX UNI: CPT | Performed by: FAMILY MEDICINE

## 2024-03-13 NOTE — PROGRESS NOTES
Assessment/Plan:   1. Bilateral impacted cerumen/Dizzinesses  Symptoms appear secondary to bilateral cerumen impaction.  This is likely causing his dizziness.  At this time, continue with supportive care.  He tolerated cerumen removal very well.  He may use Debrox eardrops in the future.  If he notes any persistent dizziness, will prescribe meclizine for him.               There are no diagnoses linked to this encounter.      Subjective:       Chief Complaint   Patient presents with    Dizziness     Itching on right ear        Patient ID: Augustine Amador is a 78 y.o. male.    Earache   There is pain in the right ear. This is a recurrent problem. The current episode started in the past 7 days. The problem occurs every few hours. The problem has been unchanged. There has been no fever. The pain is at a severity of 3/10. Associated symptoms include hearing loss. Pertinent negatives include no abdominal pain, coughing, diarrhea, ear discharge, headaches, neck pain, rash, rhinorrhea, sore throat or vomiting.       Review of Systems   Constitutional:  Negative for activity change, chills, fatigue and fever.   HENT:  Positive for ear pain and hearing loss. Negative for congestion, ear discharge, rhinorrhea, sinus pressure and sore throat.    Eyes:  Negative for redness, itching and visual disturbance.   Respiratory:  Negative for cough and shortness of breath.    Cardiovascular:  Negative for chest pain and palpitations.   Gastrointestinal:  Negative for abdominal pain, diarrhea, nausea and vomiting.   Endocrine: Negative for cold intolerance and heat intolerance.   Genitourinary:  Negative for dysuria, flank pain and frequency.   Musculoskeletal:  Negative for arthralgias, back pain, gait problem, myalgias and neck pain.   Skin:  Negative for color change and rash.   Allergic/Immunologic: Negative for environmental allergies.   Neurological:  Positive for dizziness and vertigo. Negative for numbness and headaches.  "  Psychiatric/Behavioral:  Negative for behavioral problems and sleep disturbance.        The following portions of the patient's history were reviewed and updated as appropriate : past family history, past medical history, past social history and past surgical history.    Current Outpatient Medications:     atorvastatin (LIPITOR) 20 mg tablet, Take 1 tablet (20 mg total) by mouth daily, Disp: 90 tablet, Rfl: 1    Cholecalciferol 125 MCG (5000 UT) capsule, Take 5,000 Units by mouth daily  , Disp: , Rfl:     clobetasol (TEMOVATE) 0.05 % cream, Apply 1 application topically 2 (two) times a day  , Disp: , Rfl:     fluticasone (FLONASE) 50 mcg/act nasal spray, SPRAY 2 SPRAYS INTO EACH NOSTRIL EVERY DAY, Disp: 48 mL, Rfl: 1    Magnesium Citrate 100 MG CAPS, Take 100 mg by mouth in the morning, Disp: 30 capsule, Rfl: 0    mupirocin (BACTROBAN) 2 % ointment, , Disp: , Rfl:     omeprazole (PriLOSEC) 20 mg delayed release capsule, TAKE 1 CAPSULE BY MOUTH EVERY DAY, Disp: 90 capsule, Rfl: 1    tadalafil (CIALIS) 20 MG tablet, Take 1 tablet (20 mg total) by mouth daily as needed for erectile dysfunction, Disp: 20 tablet, Rfl: 3         Objective:         Vitals:    03/13/24 1015   BP: 138/80   BP Location: Left arm   Patient Position: Sitting   Cuff Size: Adult   Pulse: 65   Temp: 97.5 °F (36.4 °C)   SpO2: 99%   Weight: 94.4 kg (208 lb 3.2 oz)   Height: 5' 11\" (1.803 m)     Physical Exam  Vitals reviewed.   Constitutional:       General: He is not in acute distress.     Appearance: Normal appearance. He is well-developed.   HENT:      Head: Normocephalic and atraumatic.      Right Ear: Tympanic membrane, ear canal and external ear normal. There is no impacted cerumen.      Left Ear: Tympanic membrane, ear canal and external ear normal. There is no impacted cerumen.      Nose: Nose normal. No congestion or rhinorrhea.      Mouth/Throat:      Mouth: Mucous membranes are moist.      Pharynx: No oropharyngeal exudate or posterior " oropharyngeal erythema.   Eyes:      General: No scleral icterus.        Right eye: No discharge.         Left eye: No discharge.      Extraocular Movements: Extraocular movements intact.      Conjunctiva/sclera: Conjunctivae normal.      Pupils: Pupils are equal, round, and reactive to light.   Neck:      Trachea: No tracheal deviation.   Cardiovascular:      Rate and Rhythm: Normal rate and regular rhythm.      Pulses: Normal pulses.           Dorsalis pedis pulses are 2+ on the right side and 2+ on the left side.        Posterior tibial pulses are 2+ on the right side and 2+ on the left side.      Heart sounds: Normal heart sounds. No murmur heard.     No friction rub. No gallop.   Pulmonary:      Effort: Pulmonary effort is normal. No respiratory distress.      Breath sounds: Normal breath sounds. No wheezing, rhonchi or rales.   Abdominal:      General: Bowel sounds are normal. There is no distension.      Palpations: Abdomen is soft.      Tenderness: There is no abdominal tenderness. There is no guarding or rebound.   Musculoskeletal:         General: Normal range of motion.      Cervical back: Normal range of motion and neck supple.      Right lower leg: No edema.      Left lower leg: No edema.   Lymphadenopathy:      Head:      Right side of head: No submental or submandibular adenopathy.      Left side of head: No submental or submandibular adenopathy.      Cervical: No cervical adenopathy.      Right cervical: No superficial, deep or posterior cervical adenopathy.     Left cervical: No superficial, deep or posterior cervical adenopathy.   Skin:     General: Skin is warm and dry.      Findings: No erythema.   Neurological:      General: No focal deficit present.      Mental Status: He is alert and oriented to person, place, and time.      Cranial Nerves: No cranial nerve deficit.      Sensory: Sensation is intact. No sensory deficit.      Motor: Motor function is intact.   Psychiatric:         Attention and  Perception: Attention and perception normal.         Mood and Affect: Mood is not anxious or depressed.         Speech: Speech normal.         Behavior: Behavior normal.         Thought Content: Thought content normal.         Judgment: Judgment normal.     Ear cerumen removal    Date/Time: 3/13/2024 10:15 AM    Performed by: Airam Nguyen DO  Authorized by: Airam Nguyen DO  Universal Protocol:  Consent: Verbal consent obtained. Written consent not obtained.  Risks and benefits: risks, benefits and alternatives were discussed  Consent given by: patient    Patient location:  Clinic  Procedure details:     Local anesthetic:  None    Location:  L ear and R ear    Procedure type: irrigation only      Approach:  External  Post-procedure details:     Complication:  None    Hearing quality:  Improved    Patient tolerance of procedure:  Tolerated well, no immediate complications

## 2024-05-08 ENCOUNTER — OFFICE VISIT (OUTPATIENT)
Dept: FAMILY MEDICINE CLINIC | Facility: CLINIC | Age: 79
End: 2024-05-08
Payer: COMMERCIAL

## 2024-05-08 VITALS
HEIGHT: 71 IN | SYSTOLIC BLOOD PRESSURE: 116 MMHG | HEART RATE: 76 BPM | DIASTOLIC BLOOD PRESSURE: 76 MMHG | OXYGEN SATURATION: 96 % | BODY MASS INDEX: 29.2 KG/M2 | WEIGHT: 208.6 LBS

## 2024-05-08 DIAGNOSIS — H61.23 BILATERAL IMPACTED CERUMEN: Primary | ICD-10-CM

## 2024-05-08 PROCEDURE — 69210 REMOVE IMPACTED EAR WAX UNI: CPT | Performed by: FAMILY MEDICINE

## 2024-05-08 PROCEDURE — 99214 OFFICE O/P EST MOD 30 MIN: CPT | Performed by: FAMILY MEDICINE

## 2024-05-08 NOTE — PROGRESS NOTES
Assessment/Plan:   1. Bilateral impacted cerumen  Symptoms today.  Mildly impacted.  At this time,  he tolerated cerumen removal very well.  Continue with routine monitoring.  He may use Debrox eardrops at home.  If any symptoms worsen, he is advised to call or follow-up.           There are no diagnoses linked to this encounter.      Subjective:       Chief Complaint   Patient presents with    Earache     Pt states since march 12 when it was here for his ears clean, pt is c/o blocked and wax on right ear. Pt was told to come and get re-check by pcp.      Patient ID: Augustine Amador is a 78 y.o. male.    Earache   There is pain in the right ear. This is a recurrent problem. The current episode started in the past 7 days. The problem occurs constantly. The problem has been unchanged. There has been no fever. The pain is mild. Associated symptoms include hearing loss. Pertinent negatives include no abdominal pain, coughing, diarrhea, headaches, rhinorrhea or sore throat.       Review of Systems   Constitutional:  Negative for activity change, chills, fatigue and fever.   HENT:  Positive for ear pain and hearing loss. Negative for congestion, rhinorrhea, sinus pressure and sore throat.    Eyes:  Negative for redness, itching and visual disturbance.   Respiratory:  Negative for cough and shortness of breath.    Cardiovascular:  Negative for chest pain and palpitations.   Gastrointestinal:  Negative for abdominal pain, diarrhea and nausea.   Endocrine: Negative for cold intolerance and heat intolerance.   Genitourinary:  Negative for dysuria, flank pain and frequency.   Musculoskeletal:  Negative for arthralgias, back pain, gait problem and myalgias.   Skin:  Negative for color change.   Allergic/Immunologic: Negative for environmental allergies.   Neurological:  Negative for dizziness, numbness and headaches.   Psychiatric/Behavioral:  Negative for behavioral problems and sleep disturbance.        The following portions  "of the patient's history were reviewed and updated as appropriate : past family history, past medical history, past social history and past surgical history.    Current Outpatient Medications:     atorvastatin (LIPITOR) 20 mg tablet, Take 1 tablet (20 mg total) by mouth daily, Disp: 90 tablet, Rfl: 1    Cholecalciferol 125 MCG (5000 UT) capsule, Take 5,000 Units by mouth daily  , Disp: , Rfl:     clobetasol (TEMOVATE) 0.05 % cream, Apply 1 application topically 2 (two) times a day  , Disp: , Rfl:     fluticasone (FLONASE) 50 mcg/act nasal spray, SPRAY 2 SPRAYS INTO EACH NOSTRIL EVERY DAY, Disp: 48 mL, Rfl: 1    Magnesium Citrate 100 MG CAPS, Take 100 mg by mouth in the morning, Disp: 30 capsule, Rfl: 0    mupirocin (BACTROBAN) 2 % ointment, , Disp: , Rfl:     tadalafil (CIALIS) 20 MG tablet, Take 1 tablet (20 mg total) by mouth daily as needed for erectile dysfunction, Disp: 20 tablet, Rfl: 3    omeprazole (PriLOSEC) 20 mg delayed release capsule, TAKE 1 CAPSULE BY MOUTH EVERY DAY, Disp: 90 capsule, Rfl: 1         Objective:         Vitals:    05/08/24 1151   BP: 140/76   BP Location: Left arm   Patient Position: Sitting   Cuff Size: Large   Pulse: 76   SpO2: 96%   Weight: 94.6 kg (208 lb 9.6 oz)   Height: 5' 11\" (1.803 m)     Physical Exam  Vitals reviewed.   Constitutional:       Appearance: Normal appearance. He is well-developed.   HENT:      Head: Normocephalic and atraumatic.      Right Ear: Hearing normal.      Left Ear: Hearing normal.      Nose: Nose normal. No septal deviation.   Eyes:      General: Lids are normal.      Pupils: Pupils are equal, round, and reactive to light.   Neck:      Thyroid: No thyroid mass or thyromegaly.      Trachea: Trachea normal.   Cardiovascular:      Rate and Rhythm: Normal rate.   Pulmonary:      Effort: Pulmonary effort is normal. No respiratory distress.      Breath sounds: No decreased breath sounds.   Abdominal:      Tenderness: There is no guarding.   Musculoskeletal:   "       General: Normal range of motion.      Cervical back: Normal range of motion.   Skin:     General: Skin is warm and dry.   Neurological:      Mental Status: He is alert and oriented to person, place, and time.      Cranial Nerves: No cranial nerve deficit.      Sensory: No sensory deficit.   Psychiatric:         Speech: Speech normal.         Behavior: Behavior normal.         Thought Content: Thought content normal.         Judgment: Judgment normal.       Ear cerumen removal    Date/Time: 5/8/2024 12:00 PM    Performed by: Airam Nguyen DO  Authorized by: Airam Nguyen DO  Universal Protocol:  Consent: Verbal consent obtained. Written consent not obtained.  Risks and benefits: risks, benefits and alternatives were discussed  Consent given by: patient    Procedure details:     Local anesthetic:  None    Location:  L ear and R ear    Procedure type: irrigation with instrumentation      Instrumentation: curette      Approach:  External  Post-procedure details:     Complication:  None    Hearing quality:  Improved    Patient tolerance of procedure:  Tolerated well, no immediate complications

## 2024-08-05 DIAGNOSIS — E78.5 HYPERLIPIDEMIA, UNSPECIFIED HYPERLIPIDEMIA TYPE: ICD-10-CM

## 2024-08-05 RX ORDER — ATORVASTATIN CALCIUM 20 MG/1
20 TABLET, FILM COATED ORAL DAILY
Qty: 100 TABLET | Refills: 1 | Status: SHIPPED | OUTPATIENT
Start: 2024-08-05

## 2024-10-12 ENCOUNTER — OFFICE VISIT (OUTPATIENT)
Dept: URGENT CARE | Facility: CLINIC | Age: 79
End: 2024-10-12
Payer: COMMERCIAL

## 2024-10-12 ENCOUNTER — TELEPHONE (OUTPATIENT)
Dept: OTHER | Facility: OTHER | Age: 79
End: 2024-10-12

## 2024-10-12 VITALS
SYSTOLIC BLOOD PRESSURE: 124 MMHG | HEART RATE: 68 BPM | RESPIRATION RATE: 18 BRPM | TEMPERATURE: 97.5 F | OXYGEN SATURATION: 95 % | DIASTOLIC BLOOD PRESSURE: 80 MMHG

## 2024-10-12 DIAGNOSIS — H61.21 IMPACTED CERUMEN OF RIGHT EAR: Primary | ICD-10-CM

## 2024-10-12 PROCEDURE — 99213 OFFICE O/P EST LOW 20 MIN: CPT | Performed by: NURSE PRACTITIONER

## 2024-10-12 NOTE — PROGRESS NOTES
NAME: Augustine Amador is a 79 y.o. male  : 1945    MRN: [id number]    /80   Pulse 68   Temp 97.5 °F (36.4 °C) (Tympanic)   Resp 18   SpO2 95%     12:02 PM    Assessment and Plan   Impacted cerumen of right ear [H61.21]  1. Impacted cerumen of right ear  Ear cerumen removal    carbamide peroxide (DEBROX) 6.5 % otic solution 5 drop        Administrations This Visit       carbamide peroxide (DEBROX) 6.5 % otic solution 5 drop       Admin Date  10/12/2024 Action  Given Dose  5 drop Route  Right Ear Documented By  Linda Daniel RN                  Augustine was seen today for cerumen impaction.    Diagnoses and all orders for this visit:    Impacted cerumen of right ear  -     Ear cerumen removal  -     carbamide peroxide (DEBROX) 6.5 % otic solution 5 drop      Ear cerumen removal    Date/Time: 10/12/2024 11:30 AM    Performed by: YUE Quezada  Authorized by: YUE Quezada  Universal Protocol:  Consent given by: patient    Patient location:  Clinic  Procedure details:     Local anesthetic:  None    Location:  R ear    Procedure type: irrigation only      Equipment used:  Elephant trunk irrigation system  Post-procedure details:     Complication:  None    Patient tolerance of procedure:  Tolerated well, no immediate complications  Comments:      Not successful with cerumen removal. Patient will pre-treat for the next few days both ears with debrox and return to have both ears flushed. Pt aware not to use his hearing aids.         Patient Instructions     Patient Instructions   Pre treat with debrox, aware to do both as needed, discussed to f/u with pcp or return for cerumen impaction removal.     Proceed to the nearest ER if symptoms worsen, Follow up with your PCP  Continue to social distance, wash your hands, and wear your masks. Please continue to follow the CDC.gov guidelines daily for they are subject to change on COVID-19    Chief Complaint     Chief Complaint   Patient presents with     Cerumen Impaction     Last couple days. Right ear is clogged. Wants both ears checked.         History of Present Illness     79 yr old man here today for b/l ear wax impaction, right side worse than left, wants both checked and irrigated if needed. Pt didn't pretreat prior to arrival. Hearing aids removed by patient.             Review of Systems   Review of Systems   HENT:  Negative for ear discharge and ear pain.          Current Medications       Current Outpatient Medications:     atorvastatin (LIPITOR) 20 mg tablet, TAKE 1 TABLET BY MOUTH EVERY DAY, Disp: 100 tablet, Rfl: 1    Cholecalciferol 125 MCG (5000 UT) capsule, Take 5,000 Units by mouth daily  , Disp: , Rfl:     clobetasol (TEMOVATE) 0.05 % cream, Apply 1 application topically 2 (two) times a day  , Disp: , Rfl:     fluticasone (FLONASE) 50 mcg/act nasal spray, SPRAY 2 SPRAYS INTO EACH NOSTRIL EVERY DAY, Disp: 48 mL, Rfl: 1    Magnesium Citrate 100 MG CAPS, Take 100 mg by mouth in the morning, Disp: 30 capsule, Rfl: 0    mupirocin (BACTROBAN) 2 % ointment, , Disp: , Rfl:     omeprazole (PriLOSEC) 20 mg delayed release capsule, TAKE 1 CAPSULE BY MOUTH EVERY DAY, Disp: 90 capsule, Rfl: 1    tadalafil (CIALIS) 20 MG tablet, Take 1 tablet (20 mg total) by mouth daily as needed for erectile dysfunction, Disp: 20 tablet, Rfl: 3  No current facility-administered medications for this visit.    Current Allergies     Allergies as of 10/12/2024 - Reviewed 10/12/2024   Allergen Reaction Noted    Latex Rash 05/18/2017    Other Rash 06/28/2017              Past Medical History:   Diagnosis Date    Allergic rhinitis due to pollen     last assessed: May 31, 2016    Atypical pigmented skin lesion     last assessed: May 11, 2015    Enlarged prostate     GERD (gastroesophageal reflux disease)     Hemorrhoids     last assessed: May 19, 2014    Hyperlipidemia     Malignant neoplasm of skin     BCCA    Persistent dry cough     last assessed/resolved: July 21, 2015    PONV  (postoperative nausea and vomiting)     Wears hearing aid     resolved: June 19, 2017       Past Surgical History:   Procedure Laterality Date    BACK SURGERY      CARPAL TUNNEL RELEASE Bilateral     CATARACT EXTRACTION Bilateral     COLONOSCOPY      HEMORRHOID SURGERY      NEUROPLASTY / TRANSPOSITION MEDIAN NERVE AT CARPAL TUNNEL      OTHER SURGICAL HISTORY      Basal Cell    MN LAMNOTMY INCL W/DCMPRSN NRV ROOT 1 INTRSPC LUMBR N/A 6/28/2017    Procedure: L4/5 MINIMALLY INVASIVE BILATERAL LAMINOTOMY FROM A LEFT SIDED APPROACH AND MICRODISCECTOMY WITH POSSIBLE EPIDURAL STEROID INJECTION;  Surgeon: Cristian Kwok MD;  [facility];  Service: Neurosurgery    MN TRURL ELECTROSURG RESCJ PROSTATE BLEED COMPLETE N/A 2/15/2022    Procedure: TURP;  Surgeon: Stephen Ornelas MD;  Location[facility];  Service: Urology    TONSILLECTOMY         Family History   Problem Relation Age of Onset    Anemia Mother     Parkinsonism Father     Anemia Family     Heart disease Family     Other Family         current diet is high in cholesterol     Alcohol abuse Neg Hx     Substance Abuse Neg Hx     Mental illness Neg Hx     Depression Neg Hx          Medications have been verified.    The following portions of the patient's history were reviewed and updated as appropriate: allergies, current medications, past family history, past medical history, past social history, past surgical history and problem list.    Objective   /80   Pulse 68   Temp 97.5 °F (36.4 °C) (Tympanic)   Resp 18   SpO2 95%      Physical Exam     Physical Exam  HENT:      Right Ear: There is impacted cerumen.      Left Ear: There is impacted cerumen.      Nose: Nose normal.      Mouth/Throat:      Mouth: Mucous membranes are moist.   Neurological:      Mental Status: He is alert.       Right ear flushing was not successful pt will pre treat and return in the next few days or see his PCP        Note: Portions of this record may have been created with voice  "recognition software. Occasional wrong word or \"sound a like\" substitutions may have occurred due to the inherent limitations of voice recognition software. Please read the chart carefully and recognize, using context, where substitutions have occurred.*    YUE Quezada  "

## 2024-10-12 NOTE — TELEPHONE ENCOUNTER
"Pt stated, \" I would like to schedule an appointment with my PCP.\"    Pt declined to speak with a nurse.    Please follow up, thank you!   "

## 2024-10-14 ENCOUNTER — IMMUNIZATIONS (OUTPATIENT)
Dept: FAMILY MEDICINE CLINIC | Facility: CLINIC | Age: 79
End: 2024-10-14
Payer: COMMERCIAL

## 2024-10-14 DIAGNOSIS — Z23 ENCOUNTER FOR IMMUNIZATION: Primary | ICD-10-CM

## 2024-10-14 PROCEDURE — 90662 IIV NO PRSV INCREASED AG IM: CPT

## 2024-10-14 PROCEDURE — G0008 ADMIN INFLUENZA VIRUS VAC: HCPCS

## 2024-10-15 ENCOUNTER — OFFICE VISIT (OUTPATIENT)
Dept: FAMILY MEDICINE CLINIC | Facility: CLINIC | Age: 79
End: 2024-10-15
Payer: COMMERCIAL

## 2024-10-15 VITALS
OXYGEN SATURATION: 96 % | SYSTOLIC BLOOD PRESSURE: 130 MMHG | TEMPERATURE: 98.4 F | BODY MASS INDEX: 29.15 KG/M2 | WEIGHT: 209 LBS | HEART RATE: 64 BPM | DIASTOLIC BLOOD PRESSURE: 82 MMHG

## 2024-10-15 DIAGNOSIS — H61.23 BILATERAL HEARING LOSS DUE TO CERUMEN IMPACTION: Primary | ICD-10-CM

## 2024-10-15 PROCEDURE — 69210 REMOVE IMPACTED EAR WAX UNI: CPT | Performed by: FAMILY MEDICINE

## 2024-10-15 PROCEDURE — 99214 OFFICE O/P EST MOD 30 MIN: CPT | Performed by: FAMILY MEDICINE

## 2024-10-15 NOTE — PROGRESS NOTES
Assessment/Plan:   1. Bilateral hearing loss due to cerumen impaction  Patient tolerated cerumen removal with irrigation as well as use of a curette.  At this time, he was advised on importance of using Debrox periodically at home.  If needed, he may follow-up anytime for cerumen irrigation.             There are no diagnoses linked to this encounter.      Subjective:       Chief Complaint   Patient presents with    Cerumen Impaction     Bilateral        Patient ID: Augustine Amador is a 79 y.o. male.    Ear Fullness   There is pain in the right ear. This is a recurrent problem. The current episode started in the past 7 days. The problem occurs constantly. The problem has been unchanged. There has been no fever. The patient is experiencing no pain. Associated symptoms include hearing loss. Pertinent negatives include no abdominal pain, coughing, diarrhea, headaches, rhinorrhea or sore throat. Treatments tried: Debrox eardrops. The treatment provided no relief.       Review of Systems   Constitutional:  Negative for activity change, chills, fatigue and fever.   HENT:  Positive for hearing loss. Negative for congestion, ear pain, rhinorrhea, sinus pressure and sore throat.    Eyes:  Negative for redness, itching and visual disturbance.   Respiratory:  Negative for cough and shortness of breath.    Cardiovascular:  Negative for chest pain and palpitations.   Gastrointestinal:  Negative for abdominal pain, diarrhea and nausea.   Endocrine: Negative for cold intolerance and heat intolerance.   Genitourinary:  Negative for dysuria, flank pain and frequency.   Musculoskeletal:  Negative for arthralgias, back pain, gait problem and myalgias.   Skin:  Negative for color change.   Allergic/Immunologic: Negative for environmental allergies.   Neurological:  Negative for dizziness, numbness and headaches.   Psychiatric/Behavioral:  Negative for behavioral problems and sleep disturbance.        The following portions of the  patient's history were reviewed and updated as appropriate : past family history, past medical history, past social history and past surgical history.    Current Outpatient Medications:     atorvastatin (LIPITOR) 20 mg tablet, TAKE 1 TABLET BY MOUTH EVERY DAY, Disp: 100 tablet, Rfl: 1    Cholecalciferol 125 MCG (5000 UT) capsule, Take 5,000 Units by mouth daily  , Disp: , Rfl:     clobetasol (TEMOVATE) 0.05 % cream, Apply 1 application topically 2 (two) times a day  , Disp: , Rfl:     fluticasone (FLONASE) 50 mcg/act nasal spray, SPRAY 2 SPRAYS INTO EACH NOSTRIL EVERY DAY, Disp: 48 mL, Rfl: 1    tadalafil (CIALIS) 20 MG tablet, Take 1 tablet (20 mg total) by mouth daily as needed for erectile dysfunction, Disp: 20 tablet, Rfl: 3    Magnesium Citrate 100 MG CAPS, Take 100 mg by mouth in the morning (Patient not taking: Reported on 10/15/2024), Disp: 30 capsule, Rfl: 0    mupirocin (BACTROBAN) 2 % ointment, , Disp: , Rfl:     omeprazole (PriLOSEC) 20 mg delayed release capsule, TAKE 1 CAPSULE BY MOUTH EVERY DAY, Disp: 90 capsule, Rfl: 1         Objective:         Vitals:    10/15/24 1803   BP: 130/82   BP Location: Left arm   Patient Position: Sitting   Cuff Size: Large   Pulse: 64   Temp: 98.4 °F (36.9 °C)   TempSrc: Temporal   SpO2: 96%   Weight: 94.8 kg (209 lb)     Physical Exam  Vitals reviewed.   Constitutional:       Appearance: Normal appearance. He is well-developed.   HENT:      Head: Normocephalic and atraumatic.      Right Ear: Hearing normal.      Left Ear: Hearing normal.      Nose: Nose normal. No septal deviation.   Eyes:      General: Lids are normal.      Pupils: Pupils are equal, round, and reactive to light.   Neck:      Thyroid: No thyroid mass or thyromegaly.      Trachea: Trachea normal.   Cardiovascular:      Rate and Rhythm: Normal rate.   Pulmonary:      Effort: Pulmonary effort is normal. No respiratory distress.      Breath sounds: No decreased breath sounds.   Abdominal:      Tenderness:  There is no guarding.   Musculoskeletal:         General: Normal range of motion.      Cervical back: Normal range of motion.   Skin:     General: Skin is warm and dry.   Neurological:      Mental Status: He is alert and oriented to person, place, and time.      Cranial Nerves: No cranial nerve deficit.      Sensory: No sensory deficit.   Psychiatric:         Speech: Speech normal.         Behavior: Behavior normal.         Thought Content: Thought content normal.         Judgment: Judgment normal.     Ear cerumen removal    Date/Time: 10/15/2024 6:00 PM    Performed by: Airam Nguyen DO  Authorized by: Airam Nguyen DO  Universal Protocol:  Consent: Verbal consent obtained. Written consent not obtained.  Risks and benefits: risks, benefits and alternatives were discussed  Consent given by: patient  Timeout called at: 10/15/2024 6:26 PM.    Patient location:  Clinic  Procedure details:     Local anesthetic:  None    Location:  Both ears    Procedure type: irrigation with instrumentation      Instrumentation: curette      Approach:  External  Post-procedure details:     Complication:  None    Hearing quality:  Improved    Patient tolerance of procedure:  Tolerated well, no immediate complications

## 2024-11-27 ENCOUNTER — OFFICE VISIT (OUTPATIENT)
Dept: PODIATRY | Facility: CLINIC | Age: 79
End: 2024-11-27
Payer: COMMERCIAL

## 2024-11-27 VITALS — BODY MASS INDEX: 29.26 KG/M2 | WEIGHT: 209 LBS | HEIGHT: 71 IN | RESPIRATION RATE: 18 BRPM

## 2024-11-27 DIAGNOSIS — L60.3 NAIL DYSTROPHY: Primary | ICD-10-CM

## 2024-11-27 PROCEDURE — 99202 OFFICE O/P NEW SF 15 MIN: CPT | Performed by: PODIATRIST

## 2024-11-27 NOTE — PROGRESS NOTES
Name: Augustine Amador      : 1945      MRN: [id number]  Encounter Provider: Kiran Mccoy DPM  Encounter Date: 2024   Encounter department: [facility] [place]     Treatment consisted of nail trimming.  Explained to patient that routine nail care is not a covered service as he has excellent circulation.  He desired to be seen in a timely manner still.  He is rescheduled in 10 weeks.      :  Assessment & Plan  Nail dystrophy             History of Present Illness     HPI  Augustine Amador is a 79 y.o. male who presents for toenail care.  Patient states that he has vision problems and he has hurt himself trying to cut his own toenails.  Other than elongated nails, he has no foot disorder.      Review of Systems   HENT:  Positive for hearing loss.    Eyes:  Positive for visual disturbance.   Gastrointestinal:         Irritable bowel syndrome   Musculoskeletal:  Negative for gait problem.         Past Medical History   Past Medical History:   Diagnosis Date    Allergic rhinitis due to pollen     last assessed: May 31, 2016    Atypical pigmented skin lesion     last assessed: May 11, 2015    Diverticulitis of colon     Enlarged prostate     GERD (gastroesophageal reflux disease)     Headache(784.0)     Hemorrhoids     last assessed: May 19, 2014    HL (hearing loss)     Hyperlipidemia     Malignant neoplasm of skin     BCCA    Persistent dry cough     last assessed/resolved: 2015    PONV (postoperative nausea and vomiting)     Visual impairment     Wears hearing aid     resolved: 2017     Past Surgical History:   Procedure Laterality Date    BACK SURGERY      CARPAL TUNNEL RELEASE Bilateral     CATARACT EXTRACTION Bilateral     COLONOSCOPY      EYE SURGERY      HEMORRHOID SURGERY      NEUROPLASTY / TRANSPOSITION MEDIAN NERVE AT CARPAL TUNNEL      OTHER SURGICAL HISTORY      Basal Cell    LA LAMNOTMY INCL W/DCMPRSN NRV ROOT 1 INTRSPC LUMBR N/A 2017    Procedure: L4/5  MINIMALLY INVASIVE BILATERAL LAMINOTOMY FROM A LEFT SIDED APPROACH AND MICRODISCECTOMY WITH POSSIBLE EPIDURAL STEROID INJECTION;  Surgeon: Cristian Kwok MD;  [facility];  Service: Neurosurgery    ND TRURL ELECTROSURG RESCJ PROSTATE BLEED COMPLETE N/A 02/15/2022    Procedure: TURP;  Surgeon: Stephen Ornelas MD;  [facility];  Service: Urology    PROSTATE SURGERY      SPINE SURGERY      TONSILLECTOMY       Family History   Problem Relation Age of Onset    Anemia Mother     Parkinsonism Father     Heart disease Father     Anemia Family     Heart disease Family     Other Family         current diet is high in cholesterol     Alcohol abuse Neg Hx     Substance Abuse Neg Hx     Mental illness Neg Hx     Depression Neg Hx       reports that he quit smoking about 34 years ago. His smoking use included cigarettes. He started smoking about 61 years ago. He has never used smokeless tobacco. He reports current alcohol use of about 7.0 standard drinks of alcohol per week. He reports that he does not use drugs.  Current Outpatient Medications on File Prior to Visit   Medication Sig Dispense Refill    atorvastatin (LIPITOR) 20 mg tablet TAKE 1 TABLET BY MOUTH EVERY  tablet 1    Cholecalciferol 125 MCG (5000 UT) capsule Take 5,000 Units by mouth daily        clobetasol (TEMOVATE) 0.05 % cream Apply 1 application topically 2 (two) times a day        fluticasone (FLONASE) 50 mcg/act nasal spray SPRAY 2 SPRAYS INTO EACH NOSTRIL EVERY DAY 48 mL 1    Magnesium Citrate 100 MG CAPS Take 100 mg by mouth in the morning (Patient not taking: Reported on 10/15/2024) 30 capsule 0    mupirocin (BACTROBAN) 2 % ointment  (Patient not taking: Reported on 10/15/2024)      omeprazole (PriLOSEC) 20 mg delayed release capsule TAKE 1 CAPSULE BY MOUTH EVERY DAY 90 capsule 1    tadalafil (CIALIS) 20 MG tablet Take 1 tablet (20 mg total) by mouth daily as needed for erectile dysfunction 20 tablet 3     No current  "facility-administered medications on file prior to visit.     Allergies   Allergen Reactions    Latex Rash     Seems Ok with paper tape but patient thinks allergic to both latex and adhesive    Other Rash     Adhesive tapes           Objective   Resp 18   Ht 5' 11\" (1.803 m)   Wt 94.8 kg (209 lb)   BMI 29.15 kg/m²      Physical Exam  Constitutional:       Appearance: Normal appearance.   Cardiovascular:      Pulses: Normal pulses.   Musculoskeletal:         General: Normal range of motion.   Skin:     Comments: All toenails are elongated   Neurological:      General: No focal deficit present.      Mental Status: He is oriented to person, place, and time.               "

## 2025-02-02 DIAGNOSIS — E78.5 HYPERLIPIDEMIA, UNSPECIFIED HYPERLIPIDEMIA TYPE: ICD-10-CM

## 2025-02-03 ENCOUNTER — RA CDI HCC (OUTPATIENT)
Dept: OTHER | Facility: HOSPITAL | Age: 80
End: 2025-02-03

## 2025-02-03 RX ORDER — ATORVASTATIN CALCIUM 20 MG/1
20 TABLET, FILM COATED ORAL DAILY
Qty: 30 TABLET | Refills: 0 | Status: SHIPPED | OUTPATIENT
Start: 2025-02-03 | End: 2025-02-10 | Stop reason: SDUPTHER

## 2025-02-03 NOTE — PROGRESS NOTES
HCC coding opportunities       Chart reviewed, no opportunity found: CHART REVIEWED, NO OPPORTUNITY FOUND        Patients Insurance     Medicare Insurance: [employer]          This is a reminder to assess all HCC (risk adjustment) codes for the year 2025 as patients CELESTE scores reset to zero with the New year.

## 2025-02-04 LAB
ALBUMIN SERPL-MCNC: 4.6 G/DL (ref 3.6–5.1)
ALBUMIN/GLOB SERPL: 1.8 (CALC) (ref 1–2.5)
ALP SERPL-CCNC: 45 U/L (ref 35–144)
ALT SERPL-CCNC: 21 U/L (ref 9–46)
AST SERPL-CCNC: 16 U/L (ref 10–35)
BILIRUB SERPL-MCNC: 0.8 MG/DL (ref 0.2–1.2)
BUN SERPL-MCNC: 17 MG/DL (ref 7–25)
BUN/CREAT SERPL: ABNORMAL (CALC) (ref 6–22)
CALCIUM SERPL-MCNC: 9.3 MG/DL (ref 8.6–10.3)
CHLORIDE SERPL-SCNC: 104 MMOL/L (ref 98–110)
CHOLEST SERPL-MCNC: 186 MG/DL
CHOLEST/HDLC SERPL: 4.8 (CALC)
CO2 SERPL-SCNC: 29 MMOL/L (ref 20–32)
CREAT SERPL-MCNC: 0.82 MG/DL (ref 0.7–1.28)
ERYTHROCYTE [DISTWIDTH] IN BLOOD BY AUTOMATED COUNT: 13.3 % (ref 11–15)
EST. AVERAGE GLUCOSE BLD GHB EST-MCNC: 126 MG/DL
EST. AVERAGE GLUCOSE BLD GHB EST-SCNC: 7 MMOL/L
GFR/BSA.PRED SERPLBLD CYS-BASED-ARV: 90 ML/MIN/1.73M2
GLOBULIN SER CALC-MCNC: 2.5 G/DL (CALC) (ref 1.9–3.7)
GLUCOSE SERPL-MCNC: 103 MG/DL (ref 65–99)
HBA1C MFR BLD: 6 % OF TOTAL HGB
HCT VFR BLD AUTO: 44.5 % (ref 38.5–50)
HDLC SERPL-MCNC: 39 MG/DL
HGB BLD-MCNC: 14.9 G/DL (ref 13.2–17.1)
LDLC SERPL CALC-MCNC: 120 MG/DL (CALC)
MCH RBC QN AUTO: 31.2 PG (ref 27–33)
MCHC RBC AUTO-ENTMCNC: 33.5 G/DL (ref 32–36)
MCV RBC AUTO: 93.1 FL (ref 80–100)
NONHDLC SERPL-MCNC: 147 MG/DL (CALC)
PLATELET # BLD AUTO: 172 THOUSAND/UL (ref 140–400)
PMV BLD REES-ECKER: 11.4 FL (ref 7.5–12.5)
POTASSIUM SERPL-SCNC: 4.4 MMOL/L (ref 3.5–5.3)
PROT SERPL-MCNC: 7.1 G/DL (ref 6.1–8.1)
RBC # BLD AUTO: 4.78 MILLION/UL (ref 4.2–5.8)
SODIUM SERPL-SCNC: 141 MMOL/L (ref 135–146)
TRIGL SERPL-MCNC: 156 MG/DL
WBC # BLD AUTO: 6.6 THOUSAND/UL (ref 3.8–10.8)

## 2025-02-05 ENCOUNTER — OFFICE VISIT (OUTPATIENT)
Dept: PODIATRY | Facility: CLINIC | Age: 80
End: 2025-02-05

## 2025-02-05 VITALS — BODY MASS INDEX: 29.4 KG/M2 | HEIGHT: 71 IN | WEIGHT: 210 LBS | RESPIRATION RATE: 18 BRPM

## 2025-02-05 DIAGNOSIS — L60.3 NAIL DYSTROPHY: Primary | ICD-10-CM

## 2025-02-05 PROCEDURE — NCFTCARE PR NON-COVERED FOOT CARE: Performed by: PODIATRIST

## 2025-02-05 NOTE — PROGRESS NOTES
Patient presents for palliative toenail care.  No acute disorder noted.  Pedal pulses are palpable.  Treatment consists of nail trimming.  Reappoint 2 months at patient request.

## 2025-02-10 ENCOUNTER — OFFICE VISIT (OUTPATIENT)
Dept: FAMILY MEDICINE CLINIC | Facility: CLINIC | Age: 80
End: 2025-02-10
Payer: COMMERCIAL

## 2025-02-10 VITALS
OXYGEN SATURATION: 96 % | DIASTOLIC BLOOD PRESSURE: 72 MMHG | WEIGHT: 211.6 LBS | HEIGHT: 71 IN | BODY MASS INDEX: 29.62 KG/M2 | TEMPERATURE: 97.6 F | HEART RATE: 67 BPM | SYSTOLIC BLOOD PRESSURE: 124 MMHG

## 2025-02-10 DIAGNOSIS — D69.6 THROMBOCYTOPENIA (HCC): ICD-10-CM

## 2025-02-10 DIAGNOSIS — E78.5 HYPERLIPIDEMIA, UNSPECIFIED HYPERLIPIDEMIA TYPE: ICD-10-CM

## 2025-02-10 DIAGNOSIS — Z00.00 MEDICARE ANNUAL WELLNESS VISIT, SUBSEQUENT: Primary | ICD-10-CM

## 2025-02-10 DIAGNOSIS — R73.03 PRE-DIABETES: ICD-10-CM

## 2025-02-10 DIAGNOSIS — M48.062 LUMBAR STENOSIS WITH NEUROGENIC CLAUDICATION: Chronic | ICD-10-CM

## 2025-02-10 DIAGNOSIS — H61.23 BILATERAL HEARING LOSS DUE TO CERUMEN IMPACTION: ICD-10-CM

## 2025-02-10 PROCEDURE — 99214 OFFICE O/P EST MOD 30 MIN: CPT | Performed by: FAMILY MEDICINE

## 2025-02-10 PROCEDURE — 69210 REMOVE IMPACTED EAR WAX UNI: CPT | Performed by: FAMILY MEDICINE

## 2025-02-10 PROCEDURE — G0439 PPPS, SUBSEQ VISIT: HCPCS | Performed by: FAMILY MEDICINE

## 2025-02-10 RX ORDER — ATORVASTATIN CALCIUM 20 MG/1
20 TABLET, FILM COATED ORAL DAILY
Qty: 90 TABLET | Refills: 1 | Status: SHIPPED | OUTPATIENT
Start: 2025-02-10 | End: 2025-05-11

## 2025-02-10 NOTE — ASSESSMENT & PLAN NOTE
Patient with worsening lumbar stenosis.  At this time, we will check MRI of his lumbar spine.  He may benefit from seeing neurosurgery again in the future.  He was advised to restart his therapeutic exercise.  Orders:    MRI lumbar spine w wo contrast; Future

## 2025-02-10 NOTE — PATIENT INSTRUCTIONS
Medicare Preventive Visit Patient Instructions  Thank you for completing your Welcome to Medicare Visit or Medicare Annual Wellness Visit today. Your next wellness visit will be due in one year (2/11/2026).  The screening/preventive services that you may require over the next 5-10 years are detailed below. Some tests may not apply to you based off risk factors and/or age. Screening tests ordered at today's visit but not completed yet may show as past due. Also, please note that scanned in results may not display below.  Preventive Screenings:  Service Recommendations Previous Testing/Comments   Colorectal Cancer Screening  Colonoscopy    Fecal Occult Blood Test (FOBT)/Fecal Immunochemical Test (FIT)  Fecal DNA/Cologuard Test  Flexible Sigmoidoscopy Age: 45-75 years old   Colonoscopy: every 10 years (May be performed more frequently if at higher risk)  OR  FOBT/FIT: every 1 year  OR  Cologuard: every 3 years  OR  Sigmoidoscopy: every 5 years  Screening may be recommended earlier than age 45 if at higher risk for colorectal cancer. Also, an individualized decision between you and your healthcare provider will decide whether screening between the ages of 76-85 would be appropriate. Colonoscopy: 07/16/2019  FOBT/FIT: Not on file  Cologuard: Not on file  Sigmoidoscopy: Not on file          Prostate Cancer Screening Individualized decision between patient and health care provider in men between ages of 55-69   Medicare will cover every 12 months beginning on the day after your 50th birthday PSA: 0.33 ng/mL     Screening Not Indicated     Hepatitis C Screening Once for adults born between 1945 and 1965  More frequently in patients at high risk for Hepatitis C Hep C Antibody: 05/23/2019    Screening Current   Diabetes Screening 1-2 times per year if you're at risk for diabetes or have pre-diabetes Fasting glucose: No results in last 5 years (No results in last 5 years)  A1C: 6.0 % of total Hgb (2/3/2025)  Screening Current    Cholesterol Screening Once every 5 years if you don't have a lipid disorder. May order more often based on risk factors. Lipid panel: 02/03/2025  Screening Not Indicated  History Lipid Disorder      Other Preventive Screenings Covered by Medicare:  Abdominal Aortic Aneurysm (AAA) Screening: covered once if your at risk. You're considered to be at risk if you have a family history of AAA or a male between the age of 65-75 who smoking at least 100 cigarettes in your lifetime.  Lung Cancer Screening: covers low dose CT scan once per year if you meet all of the following conditions: (1) Age 55-77; (2) No signs or symptoms of lung cancer; (3) Current smoker or have quit smoking within the last 15 years; (4) You have a tobacco smoking history of at least 20 pack years (packs per day x number of years you smoked); (5) You get a written order from a healthcare provider.  Glaucoma Screening: covered annually if you're considered high risk: (1) You have diabetes OR (2) Family history of glaucoma OR (3)  aged 50 and older OR (4)  American aged 65 and older  Osteoporosis Screening: covered every 2 years if you meet one of the following conditions: (1) Have a vertebral abnormality; (2) On glucocorticoid therapy for more than 3 months; (3) Have primary hyperparathyroidism; (4) On osteoporosis medications and need to assess response to drug therapy.  HIV Screening: covered annually if you're between the age of 15-65. Also covered annually if you are younger than 15 and older than 65 with risk factors for HIV infection. For pregnant patients, it is covered up to 3 times per pregnancy.    Immunizations:  Immunization Recommendations   Influenza Vaccine Annual influenza vaccination during flu season is recommended for all persons aged >= 6 months who do not have contraindications   Pneumococcal Vaccine   * Pneumococcal conjugate vaccine = PCV13 (Prevnar 13), PCV15 (Vaxneuvance), PCV20 (Prevnar 20)  *  Pneumococcal polysaccharide vaccine = PPSV23 (Pneumovax) Adults 19-63 yo with certain risk factors or if 65+ yo  If never received any pneumonia vaccine: recommend Prevnar 20 (PCV20)  Give PCV20 if previously received 1 dose of PCV13 or PPSV23   Hepatitis B Vaccine 3 dose series if at intermediate or high risk (ex: diabetes, end stage renal disease, liver disease)   Respiratory syncytial virus (RSV) Vaccine - COVERED BY MEDICARE PART D  * RSVPreF3 (Arexvy) CDC recommends that adults 60 years of age and older may receive a single dose of RSV vaccine using shared clinical decision-making (SCDM)   Tetanus (Td) Vaccine - COST NOT COVERED BY MEDICARE PART B Following completion of primary series, a booster dose should be given every 10 years to maintain immunity against tetanus. Td may also be given as tetanus wound prophylaxis.   Tdap Vaccine - COST NOT COVERED BY MEDICARE PART B Recommended at least once for all adults. For pregnant patients, recommended with each pregnancy.   Shingles Vaccine (Shingrix) - COST NOT COVERED BY MEDICARE PART B  2 shot series recommended in those 19 years and older who have or will have weakened immune systems or those 50 years and older     Health Maintenance Due:      Topic Date Due   • Colorectal Cancer Screening  07/16/2024   • Hepatitis C Screening  Completed     Immunizations Due:      Topic Date Due   • COVID-19 Vaccine (4 - 2024-25 season) 09/01/2024     Advance Directives   What are advance directives?  Advance directives are legal documents that state your wishes and plans for medical care. These plans are made ahead of time in case you lose your ability to make decisions for yourself. Advance directives can apply to any medical decision, such as the treatments you want, and if you want to donate organs.   What are the types of advance directives?  There are many types of advance directives, and each state has rules about how to use them. You may choose a combination of any of  the following:  Living will:  This is a written record of the treatment you want. You can also choose which treatments you do not want, which to limit, and which to stop at a certain time. This includes surgery, medicine, IV fluid, and tube feedings.   Durable power of  for healthcare (DPAHC):  This is a written record that states who you want to make healthcare choices for you when you are unable to make them for yourself. This person, called a proxy, is usually a family member or a friend. You may choose more than 1 proxy.  Do not resuscitate (DNR) order:  A DNR order is used in case your heart stops beating or you stop breathing. It is a request not to have certain forms of treatment, such as CPR. A DNR order may be included in other types of advance directives.  Medical directive:  This covers the care that you want if you are in a coma, near death, or unable to make decisions for yourself. You can list the treatments you want for each condition. Treatment may include pain medicine, surgery, blood transfusions, dialysis, IV or tube feedings, and a ventilator (breathing machine).  Values history:  This document has questions about your views, beliefs, and how you feel and think about life. This information can help others choose the care that you would choose.  Why are advance directives important?  An advance directive helps you control your care. Although spoken wishes may be used, it is better to have your wishes written down. Spoken wishes can be misunderstood, or not followed. Treatments may be given even if you do not want them. An advance directive may make it easier for your family to make difficult choices about your care.   Weight Management   Why it is important to manage your weight:  Being overweight increases your risk of health conditions such as heart disease, high blood pressure, type 2 diabetes, and certain types of cancer. It can also increase your risk for osteoarthritis, sleep apnea,  and other respiratory problems. Aim for a slow, steady weight loss. Even a small amount of weight loss can lower your risk of health problems.  How to lose weight safely:  A safe and healthy way to lose weight is to eat fewer calories and get regular exercise. You can lose up about 1 pound a week by decreasing the number of calories you eat by 500 calories each day.   Healthy meal plan for weight management:  A healthy meal plan includes a variety of foods, contains fewer calories, and helps you stay healthy. A healthy meal plan includes the following:  Eat whole-grain foods more often.  A healthy meal plan should contain fiber. Fiber is the part of grains, fruits, and vegetables that is not broken down by your body. Whole-grain foods are healthy and provide extra fiber in your diet. Some examples of whole-grain foods are whole-wheat breads and pastas, oatmeal, brown rice, and bulgur.  Eat a variety of vegetables every day.  Include dark, leafy greens such as spinach, kale, reggie greens, and mustard greens. Eat yellow and orange vegetables such as carrots, sweet potatoes, and winter squash.   Eat a variety of fruits every day.  Choose fresh or canned fruit (canned in its own juice or light syrup) instead of juice. Fruit juice has very little or no fiber.  Eat low-fat dairy foods.  Drink fat-free (skim) milk or 1% milk. Eat fat-free yogurt and low-fat cottage cheese. Try low-fat cheeses such as mozzarella and other reduced-fat cheeses.  Choose meat and other protein foods that are low in fat.  Choose beans or other legumes such as split peas or lentils. Choose fish, skinless poultry (chicken or turkey), or lean cuts of red meat (beef or pork). Before you cook meat or poultry, cut off any visible fat.   Use less fat and oil.  Try baking foods instead of frying them. Add less fat, such as margarine, sour cream, regular salad dressing and mayonnaise to foods. Eat fewer high-fat foods. Some examples of high-fat foods  include french fries, doughnuts, ice cream, and cakes.  Eat fewer sweets.  Limit foods and drinks that are high in sugar. This includes candy, cookies, regular soda, and sweetened drinks.  Exercise:  Exercise at least 30 minutes per day on most days of the week. Some examples of exercise include walking, biking, dancing, and swimming. You can also fit in more physical activity by taking the stairs instead of the elevator or parking farther away from stores. Ask your healthcare provider about the best exercise plan for you.      © Copyright [employer] 2018 Information is for End User's use only and may not be sold, redistributed or otherwise used for commercial purposes. All illustrations and images included in CareNotes® are the copyrighted property of [employer]M., Inc. or [employer]

## 2025-02-10 NOTE — PROGRESS NOTES
Name: Augustine Amador      : 1945      MRN: [id number]  Encounter Provider: Airam Nguyen DO  Encounter Date: 2/10/2025   Encounter department: [facility]    Assessment & Plan  Hyperlipidemia, unspecified hyperlipidemia type  Stable.  At this time, recheck lipid panel.  Continue with a strict low-fat/low-cholesterol diet as well as current treat with atorvastatin.  Orders:    atorvastatin (LIPITOR) 20 mg tablet; Take 1 tablet (20 mg total) by mouth daily    Comprehensive metabolic panel; Future    Lipid Panel with Direct LDL reflex; Future    Lumbar stenosis with neurogenic claudication  Patient with worsening lumbar stenosis.  At this time, we will check MRI of his lumbar spine.  He may benefit from seeing neurosurgery again in the future.  He was advised to restart his therapeutic exercise.  Orders:    MRI lumbar spine w wo contrast; Future    Bilateral hearing loss due to cerumen impaction  Elevated cerumen removal with curette removal.  Continue with periodic use of his Debrox eardrops.       Medicare annual wellness visit, subsequent         Pre-diabetes    Orders:    Hemoglobin A1C With EAG; Future    Thrombocytopenia (HCC)    Orders:    CBC; Future       Preventive health issues were discussed with patient, and age appropriate screening tests were ordered as noted in patient's After Visit Summary. Personalized health advice and appropriate referrals for health education or preventive services given if needed, as noted in patient's After Visit Summary.    History of Present Illness     HPI   Patient is a 79-year-old male presents today for a follow-up on his chronic conditions.  He has dyslipidemia, chronic lumbar stenosis, cerumen impaction causing hearing deficiency.  He has been taking his medications regularly.  He denies adverse reactions with his medications.  Patient Care Team:  Airam Nguyen DO as PCP - General  Airam Nguyen DO as PCP - PCP-[facility]  Attributed-DO Cristian Bryan MD    Review of Systems   Constitutional:  Negative for activity change, chills, fatigue and fever.   HENT:  Negative for congestion, ear pain, sinus pressure and sore throat.    Eyes:  Negative for redness, itching and visual disturbance.   Respiratory:  Negative for cough and shortness of breath.    Cardiovascular:  Negative for chest pain and palpitations.   Gastrointestinal:  Negative for abdominal pain, diarrhea and nausea.   Endocrine: Negative for cold intolerance and heat intolerance.   Genitourinary:  Negative for dysuria, flank pain and frequency.   Musculoskeletal:  Negative for arthralgias, back pain, gait problem and myalgias.   Skin:  Negative for color change.   Allergic/Immunologic: Negative for environmental allergies.   Neurological:  Negative for dizziness, numbness and headaches.   Psychiatric/Behavioral:  Negative for behavioral problems and sleep disturbance.      Medical History Reviewed by provider this encounter:       Annual Wellness Visit Questionnaire   Augustine is here for his Subsequent Wellness visit. Last Medicare Wellness visit information reviewed, patient interviewed, no change since last AWV.     Health Risk Assessment:   Patient rates overall health as very good. Patient feels that their physical health rating is same. Patient is very satisfied with their life. Eyesight was rated as slightly worse. Hearing was rated as same. Patient feels that their emotional and mental health rating is same. Patients states they are never, rarely angry. Patient states they are never, rarely unusually tired/fatigued. Pain experienced in the last 7 days has been some. Patient's pain rating has been 4/10. Patient states that he has experienced no weight loss or gain in last 6 months.     Depression Screening:   PHQ-2 Score: 0      Fall Risk Screening:   In the past year, patient has experienced: no history of falling in past year      Home  Safety:  Patient does not have trouble with stairs inside or outside of their home. Patient has working smoke alarms and has working carbon monoxide detector. Home safety hazards include: none.     Nutrition:   Current diet is Regular and Limited junk food.     Medications:   Patient is currently taking over-the-counter supplements. OTC medications include: see medication list. Patient is able to manage medications.     Activities of Daily Living (ADLs)/Instrumental Activities of Daily Living (IADLs):   Walk and transfer into and out of bed and chair?: Yes  Dress and groom yourself?: Yes    Bathe or shower yourself?: Yes    Feed yourself? Yes  Do your laundry/housekeeping?: Yes  Manage your money, pay your bills and track your expenses?: Yes  Make your own meals?: Yes    Do your own shopping?: Yes    Previous Hospitalizations:   Any hospitalizations or ED visits within the last 12 months?: No      Advance Care Planning:   Living will: Yes    Durable POA for healthcare: Yes    Advanced directive: Yes    Advanced directive counseling given: Yes    ACP document given: Yes    Patient declined ACP directive: No    End of Life Decisions reviewed with patient: Yes    Provider agrees with end of life decisions: Yes      Cognitive Screening:   Provider or family/friend/caregiver concerned regarding cognition?: No    PREVENTIVE SCREENINGS      Cardiovascular Screening:    General: Screening Not Indicated, History Lipid Disorder, Risks and Benefits Discussed and Screening Current      Diabetes Screening:     General: Screening Current and Risks and Benefits Discussed      Colorectal Cancer Screening:     General: Risks and Benefits Discussed and Screening Not Indicated      Prostate Cancer Screening:    General: Screening Not Indicated and Risks and Benefits Discussed      Osteoporosis Screening:    General: Risks and Benefits Discussed and Screening Not Indicated      Abdominal Aortic Aneurysm (AAA) Screening:    Risk factors  include: tobacco use        General: Risks and Benefits Discussed and Screening Not Indicated      Lung Cancer Screening:     General: Screening Not Indicated and Risks and Benefits Discussed      Hepatitis C Screening:    General: Screening Current and Risks and Benefits Discussed    Screening, Brief Intervention, and Referral to Treatment (SBIRT)    Screening  Typical number of drinks in a day: 0  Typical number of drinks in a week: 3  Interpretation: Low risk drinking behavior.    AUDIT-C Screenin) How often did you have a drink containing alcohol in the past year? 2 to 3 times a week  2) How many drinks did you have on a typical day when you were drinking in the past year? 1 to 2  3) How often did you have 6 or more drinks on one occasion in the past year? never    AUDIT-C Score: 3  Interpretation: Score 0-3 (male): Negative screen for alcohol misuse    Single Item Drug Screening:  How often have you used an illegal drug (including marijuana) or a prescription medication for non-medical reasons in the past year? never    Single Item Drug Screen Score: 0  Interpretation: Negative screen for possible drug use disorder    Social Drivers of Health     Financial Resource Strain: Low Risk  (2024)    Overall Financial Resource Strain (CARDIA)     Difficulty of Paying Living Expenses: Not hard at all   Food Insecurity: No Food Insecurity (2025)    Hunger Vital Sign     Worried About Running Out of Food in the Last Year: Never true     Ran Out of Food in the Last Year: Never true   Transportation Needs: No Transportation Needs (2025)    PRAPARE - Transportation     Lack of Transportation (Medical): No     Lack of Transportation (Non-Medical): No   Housing Stability: Low Risk  (2025)    Housing Stability Vital Sign     Unable to Pay for Housing in the Last Year: No     Number of Times Moved in the Last Year: 0     Homeless in the Last Year: No   Utilities: Not At Risk (2025)    [facility] Utilities      "Threatened with loss of utilities: No     No results found.    Objective   /72 (BP Location: Left arm, Patient Position: Sitting, Cuff Size: Large)   Pulse 67   Temp 97.6 °F (36.4 °C) (Temporal)   Ht 5' 11\" (1.803 m)   Wt 96 kg (211 lb 9.6 oz)   SpO2 96%   BMI 29.51 kg/m²     Physical Exam  Vitals reviewed.   Constitutional:       General: He is not in acute distress.     Appearance: Normal appearance. He is well-developed.   HENT:      Head: Normocephalic and atraumatic.      Right Ear: Tympanic membrane, ear canal and external ear normal. There is no impacted cerumen.      Left Ear: Tympanic membrane, ear canal and external ear normal. There is no impacted cerumen.      Nose: Nose normal. No congestion or rhinorrhea.      Mouth/Throat:      Mouth: Mucous membranes are moist.      Pharynx: No oropharyngeal exudate or posterior oropharyngeal erythema.   Eyes:      General: No scleral icterus.        Right eye: No discharge.         Left eye: No discharge.      Extraocular Movements: Extraocular movements intact.      Conjunctiva/sclera: Conjunctivae normal.      Pupils: Pupils are equal, round, and reactive to light.   Neck:      Trachea: No tracheal deviation.   Cardiovascular:      Rate and Rhythm: Normal rate and regular rhythm.      Pulses: Normal pulses.           Dorsalis pedis pulses are 2+ on the right side and 2+ on the left side.        Posterior tibial pulses are 2+ on the right side and 2+ on the left side.      Heart sounds: Normal heart sounds. No murmur heard.     No friction rub. No gallop.   Pulmonary:      Effort: Pulmonary effort is normal. No respiratory distress.      Breath sounds: Normal breath sounds. No wheezing, rhonchi or rales.   Abdominal:      General: Bowel sounds are normal. There is no distension.      Palpations: Abdomen is soft.      Tenderness: There is no abdominal tenderness. There is no guarding or rebound.   Musculoskeletal:         General: Normal range of motion. "      Cervical back: Normal range of motion and neck supple.      Right lower leg: No edema.      Left lower leg: No edema.   Lymphadenopathy:      Head:      Right side of head: No submental or submandibular adenopathy.      Left side of head: No submental or submandibular adenopathy.      Cervical: No cervical adenopathy.      Right cervical: No superficial, deep or posterior cervical adenopathy.     Left cervical: No superficial, deep or posterior cervical adenopathy.   Skin:     General: Skin is warm and dry.      Findings: No erythema.   Neurological:      General: No focal deficit present.      Mental Status: He is alert and oriented to person, place, and time.      Cranial Nerves: No cranial nerve deficit.      Sensory: Sensation is intact. No sensory deficit.      Motor: Motor function is intact.   Psychiatric:         Attention and Perception: Attention and perception normal.         Mood and Affect: Mood is not anxious or depressed.         Speech: Speech normal.         Behavior: Behavior normal.         Thought Content: Thought content normal.         Judgment: Judgment normal.     Ear cerumen removal    Date/Time: 2/10/2025 9:30 AM    Performed by: Airam Nguyen DO  Authorized by: Airam Nguyen DO  Universal Protocol:  Consent: Verbal consent obtained. Written consent not obtained.  Risks and benefits: risks, benefits and alternatives were discussed  Consent given by: patient  Timeout called at: 2/10/2025 10:07 AM.    Patient location:  Clinic  Procedure details:     Local anesthetic:  None    Location:  Both ears    Procedure type: curette      Approach:  External  Post-procedure details:     Complication:  None    Hearing quality:  Improved    Patient tolerance of procedure:  Tolerated well, no immediate complications

## 2025-02-10 NOTE — ASSESSMENT & PLAN NOTE
Stable.  At this time, recheck lipid panel.  Continue with a strict low-fat/low-cholesterol diet as well as current treat with atorvastatin.  Orders:    atorvastatin (LIPITOR) 20 mg tablet; Take 1 tablet (20 mg total) by mouth daily    Comprehensive metabolic panel; Future    Lipid Panel with Direct LDL reflex; Future

## 2025-04-30 ENCOUNTER — OFFICE VISIT (OUTPATIENT)
Dept: PODIATRY | Facility: CLINIC | Age: 80
End: 2025-04-30

## 2025-04-30 VITALS — HEIGHT: 71 IN | WEIGHT: 207 LBS | RESPIRATION RATE: 18 BRPM | BODY MASS INDEX: 28.98 KG/M2

## 2025-04-30 DIAGNOSIS — L60.3 NAIL DYSTROPHY: Primary | ICD-10-CM

## 2025-04-30 PROCEDURE — NCFTCARE PR NON-COVERED FOOT CARE: Performed by: PODIATRIST

## 2025-04-30 NOTE — PROGRESS NOTES
"Name: Augustine Amador      : 1945      MRN: [id number]  Encounter Provider: Kiran Mccoy DPM  Encounter Date: 2025   Encounter department: [facility] [place]    Treatment consisted of trimming of 10 elongated toenails.  Reappoint 10 weeks at his request.  Pedal pulses are palpable.  :  Assessment & Plan  Nail dystrophy         Nail trimming    History of Present Illness   HPI  Augustine Amador is a 79 y.o. male who presents for nail care.  Patient has difficulty trimming his toenails and desires periodic palliative care.      Review of Systems   Gastrointestinal:         Irritable bowel syndrome   Hematological:  Bruises/bleeds easily.              Objective   Resp 18   Ht 5' 11\" (1.803 m)   Wt 93.9 kg (207 lb)   BMI 28.87 kg/m²      Physical Exam  Constitutional:       Appearance: Normal appearance.   Cardiovascular:      Pulses: Normal pulses.   Musculoskeletal:         General: Normal range of motion.   Skin:     Comments: All toenails are elongated.               "

## 2025-07-09 ENCOUNTER — OFFICE VISIT (OUTPATIENT)
Dept: PODIATRY | Facility: CLINIC | Age: 80
End: 2025-07-09

## 2025-07-09 VITALS — WEIGHT: 211 LBS | RESPIRATION RATE: 18 BRPM | BODY MASS INDEX: 29.54 KG/M2 | HEIGHT: 71 IN

## 2025-07-09 DIAGNOSIS — L60.3 NAIL DYSTROPHY: Primary | ICD-10-CM

## 2025-07-09 PROCEDURE — NCFTCARE PR NON-COVERED FOOT CARE: Performed by: PODIATRIST

## 2025-07-09 NOTE — PROGRESS NOTES
"Name: Augustine Amador      : 1945      MRN: [id number]  Encounter Provider: Kiran Mccoy DPM  Encounter Date: 2025   Encounter department: [facility] [place]  :  Assessment & Plan  Nail dystrophy  Nail trimming           History of Present Illness   HPI  Augustine Amador is a 79 y.o. male who presents for palliative toenail care.      Review of Systems       Objective   Resp 18   Ht 5' 11\" (1.803 m)   Wt 95.7 kg (211 lb)   BMI 29.43 kg/m²      Physical Exam    Cardiovascular:      Pulses: Normal pulses.     Skin:     Comments: Treatment consists of nail trimming.                   "

## 2025-08-21 ENCOUNTER — OFFICE VISIT (OUTPATIENT)
Dept: FAMILY MEDICINE CLINIC | Facility: CLINIC | Age: 80
End: 2025-08-21
Payer: COMMERCIAL

## 2025-08-21 VITALS
HEIGHT: 71 IN | OXYGEN SATURATION: 95 % | SYSTOLIC BLOOD PRESSURE: 130 MMHG | BODY MASS INDEX: 29.54 KG/M2 | WEIGHT: 211 LBS | TEMPERATURE: 98.3 F | DIASTOLIC BLOOD PRESSURE: 70 MMHG | HEART RATE: 67 BPM

## 2025-08-21 DIAGNOSIS — N52.8 MIXED ERECTILE DYSFUNCTION: ICD-10-CM

## 2025-08-21 DIAGNOSIS — H61.23 BILATERAL HEARING LOSS DUE TO CERUMEN IMPACTION: Primary | ICD-10-CM

## 2025-08-21 PROCEDURE — 69210 REMOVE IMPACTED EAR WAX UNI: CPT | Performed by: FAMILY MEDICINE

## 2025-08-21 PROCEDURE — 99214 OFFICE O/P EST MOD 30 MIN: CPT | Performed by: FAMILY MEDICINE

## 2025-08-21 RX ORDER — TADALAFIL 20 MG/1
20 TABLET ORAL DAILY PRN
Qty: 20 TABLET | Refills: 3 | Status: SHIPPED | OUTPATIENT
Start: 2025-08-21

## (undated) DEVICE — PREMIUM DRY TRAY LF: Brand: MEDLINE INDUSTRIES, INC.

## (undated) DEVICE — SOFT SILICONE HYDROCELLULAR FOAM DRESSING WITH LOCK AWAY LAYER: Brand: ALLEVYN LIFE M 12.9X12.9 CTN10

## (undated) DEVICE — SYRINGE 3ML LL

## (undated) DEVICE — Device

## (undated) DEVICE — TUBING SUCTION 5MM X 12 FT

## (undated) DEVICE — Device: Brand: OLYMPUS

## (undated) DEVICE — BASIC SINGLE BASIN-LF: Brand: MEDLINE INDUSTRIES, INC.

## (undated) DEVICE — INSULATED BLADE ELECTRODE;CAUTION: FOR MANUFACTURING, PROCESSING, OR REPACKING.: Brand: EDGE

## (undated) DEVICE — FLOSEAL MATRIX IS INDICATED IN SURGICAL PROCEDURES (OTHER THAN IN OPHTHALMIC) AS AN ADJUNCT TO HEMOSTASIS WHEN CONTROL OF BLEEDING BY LIGATURE OR CONVENTIONAL PROCEDURES IS INEFFECTIVE OR IMPRACTICAL.: Brand: FLOSEAL HEMOSTATIC MATRIX

## (undated) DEVICE — EXIDINE 4 PCT

## (undated) DEVICE — EVACUATOR BLADDER ELLIK DISP STRL

## (undated) DEVICE — GAUZE SPONGES,16 PLY: Brand: CURITY

## (undated) DEVICE — ANTIBACTERIAL VIOLET BRAIDED (POLYGLACTIN 910), SYNTHETIC ABSORBABLE SUTURE: Brand: COATED VICRYL

## (undated) DEVICE — SPONGE PVP SCRUB WING STERILE

## (undated) DEVICE — NEEDLE 18 G X 1 1/2 SAFETY

## (undated) DEVICE — SUT MONOCRYL 4-0 PS-2 27 IN Y426H

## (undated) DEVICE — DRAPE MICROSCOPE OPMI PENTERO

## (undated) DEVICE — SCD SEQUENTIAL COMPRESSION COMFORT SLEEVE MEDIUM KNEE LENGTH: Brand: KENDALL SCD

## (undated) DEVICE — SUT VICRYL 0 UR-6 27 IN J603H

## (undated) DEVICE — INTENDED FOR TISSUE SEPARATION, AND OTHER PROCEDURES THAT REQUIRE A SHARP SURGICAL BLADE TO PUNCTURE OR CUT.: Brand: BARD-PARKER ® CARBON RIB-BACK BLADES

## (undated) DEVICE — 3M™ STERI-STRIP™ COMPOUND BENZOIN TINCTURE 40 BAGS/CARTON 4 CARTONS/CASE C1544: Brand: 3M™ STERI-STRIP™

## (undated) DEVICE — INVIEW CLEAR LEGGINGS: Brand: CONVERTORS

## (undated) DEVICE — UNIVERSAL SPINE,KIT: Brand: CARDINAL HEALTH

## (undated) DEVICE — GLOVE SRG BIOGEL 7.5

## (undated) DEVICE — INTENDED FOR TISSUE SEPARATION, AND OTHER PROCEDURES THAT REQUIRE A SHARP SURGICAL BLADE TO PUNCTURE OR CUT.: Brand: BARD-PARKER SAFETY BLADES SIZE 15, STERILE

## (undated) DEVICE — MINOR PROCEDURE DRAPE: Brand: CONVERTORS

## (undated) DEVICE — SYRINGE 10ML LL

## (undated) DEVICE — BAG URINE DRAINAGE 4000ML CONTINUOUS IRR

## (undated) DEVICE — SNAP KOVER: Brand: UNBRANDED

## (undated) DEVICE — UROCATCH BAG

## (undated) DEVICE — PACK TUR

## (undated) DEVICE — CYSTO TUBING TUR Y IRRIGATION

## (undated) DEVICE — DRESSING MEPILEX AG BORDER 4 X 4 IN

## (undated) DEVICE — PREP SURGICAL PURPREP 26ML

## (undated) DEVICE — 4-PORT MANIFOLD: Brand: NEPTUNE 2

## (undated) DEVICE — TOOL 15BA50 LEGEND 15CM 5MM BA: Brand: MIDAS REX™

## (undated) DEVICE — GLOVE INDICATOR PI UNDERGLOVE SZ 7.5 BLUE